# Patient Record
Sex: MALE | Race: WHITE | NOT HISPANIC OR LATINO | Employment: FULL TIME | URBAN - METROPOLITAN AREA
[De-identification: names, ages, dates, MRNs, and addresses within clinical notes are randomized per-mention and may not be internally consistent; named-entity substitution may affect disease eponyms.]

---

## 2018-03-28 ENCOUNTER — HOSPITAL ENCOUNTER (EMERGENCY)
Facility: CLINIC | Age: 33
Discharge: HOME | End: 2018-03-28
Attending: EMERGENCY MEDICINE | Admitting: EMERGENCY MEDICINE
Payer: COMMERCIAL

## 2018-03-28 VITALS
WEIGHT: 204 LBS | OXYGEN SATURATION: 95 % | TEMPERATURE: 98.9 F | DIASTOLIC BLOOD PRESSURE: 79 MMHG | RESPIRATION RATE: 18 BRPM | HEART RATE: 89 BPM | SYSTOLIC BLOOD PRESSURE: 134 MMHG

## 2018-03-28 DIAGNOSIS — R10.84 ABDOMINAL PAIN, GENERALIZED: ICD-10-CM

## 2018-03-28 DIAGNOSIS — R51.9 ACUTE NONINTRACTABLE HEADACHE, UNSPECIFIED HEADACHE TYPE: ICD-10-CM

## 2018-03-28 LAB
ALBUMIN SERPL-MCNC: 3.7 G/DL (ref 3.4–5)
ALP SERPL-CCNC: 87 U/L (ref 40–150)
ALT SERPL W P-5'-P-CCNC: 34 U/L (ref 0–70)
ANION GAP SERPL CALCULATED.3IONS-SCNC: 9 MMOL/L (ref 3–14)
AST SERPL W P-5'-P-CCNC: 40 U/L (ref 0–45)
BASOPHILS # BLD AUTO: 0 10E9/L (ref 0–0.2)
BASOPHILS NFR BLD AUTO: 0.8 %
BILIRUB SERPL-MCNC: 0.7 MG/DL (ref 0.2–1.3)
BUN SERPL-MCNC: 11 MG/DL (ref 7–30)
CALCIUM SERPL-MCNC: 8.5 MG/DL (ref 8.5–10.1)
CHLORIDE SERPL-SCNC: 105 MMOL/L (ref 94–109)
CO2 SERPL-SCNC: 26 MMOL/L (ref 20–32)
CREAT SERPL-MCNC: 0.74 MG/DL (ref 0.66–1.25)
DIFFERENTIAL METHOD BLD: ABNORMAL
EOSINOPHIL # BLD AUTO: 0.1 10E9/L (ref 0–0.7)
EOSINOPHIL NFR BLD AUTO: 1.5 %
ERYTHROCYTE [DISTWIDTH] IN BLOOD BY AUTOMATED COUNT: 14.1 % (ref 10–15)
GFR SERPL CREATININE-BSD FRML MDRD: >90 ML/MIN/1.7M2
GLUCOSE SERPL-MCNC: 276 MG/DL (ref 70–99)
HCT VFR BLD AUTO: 41.2 % (ref 40–53)
HGB BLD-MCNC: 13.3 G/DL (ref 13.3–17.7)
IMM GRANULOCYTES # BLD: 0 10E9/L (ref 0–0.4)
IMM GRANULOCYTES NFR BLD: 0.4 %
LIPASE SERPL-CCNC: 221 U/L (ref 73–393)
LYMPHOCYTES # BLD AUTO: 1.4 10E9/L (ref 0.8–5.3)
LYMPHOCYTES NFR BLD AUTO: 27.6 %
MCH RBC QN AUTO: 31.2 PG (ref 26.5–33)
MCHC RBC AUTO-ENTMCNC: 32.3 G/DL (ref 31.5–36.5)
MCV RBC AUTO: 97 FL (ref 78–100)
MONOCYTES # BLD AUTO: 0.2 10E9/L (ref 0–1.3)
MONOCYTES NFR BLD AUTO: 4.2 %
NEUTROPHILS # BLD AUTO: 3.4 10E9/L (ref 1.6–8.3)
NEUTROPHILS NFR BLD AUTO: 65.5 %
NRBC # BLD AUTO: 0 10*3/UL
NRBC BLD AUTO-RTO: 0 /100
PLATELET # BLD AUTO: 154 10E9/L (ref 150–450)
POTASSIUM SERPL-SCNC: 3.7 MMOL/L (ref 3.4–5.3)
PROT SERPL-MCNC: 7.6 G/DL (ref 6.8–8.8)
RBC # BLD AUTO: 4.26 10E12/L (ref 4.4–5.9)
SODIUM SERPL-SCNC: 140 MMOL/L (ref 133–144)
WBC # BLD AUTO: 5.2 10E9/L (ref 4–11)

## 2018-03-28 PROCEDURE — 25000132 ZZH RX MED GY IP 250 OP 250 PS 637: Performed by: EMERGENCY MEDICINE

## 2018-03-28 PROCEDURE — 96361 HYDRATE IV INFUSION ADD-ON: CPT

## 2018-03-28 PROCEDURE — 99284 EMERGENCY DEPT VISIT MOD MDM: CPT | Mod: 25

## 2018-03-28 PROCEDURE — 96374 THER/PROPH/DIAG INJ IV PUSH: CPT

## 2018-03-28 PROCEDURE — 25000128 H RX IP 250 OP 636: Performed by: EMERGENCY MEDICINE

## 2018-03-28 PROCEDURE — 85025 COMPLETE CBC W/AUTO DIFF WBC: CPT | Performed by: EMERGENCY MEDICINE

## 2018-03-28 PROCEDURE — 96375 TX/PRO/DX INJ NEW DRUG ADDON: CPT

## 2018-03-28 PROCEDURE — 80053 COMPREHEN METABOLIC PANEL: CPT | Performed by: EMERGENCY MEDICINE

## 2018-03-28 PROCEDURE — 83690 ASSAY OF LIPASE: CPT | Performed by: EMERGENCY MEDICINE

## 2018-03-28 RX ORDER — DIPHENHYDRAMINE HYDROCHLORIDE 50 MG/ML
25 INJECTION INTRAMUSCULAR; INTRAVENOUS ONCE
Status: DISCONTINUED | OUTPATIENT
Start: 2018-03-28 | End: 2018-03-28 | Stop reason: HOSPADM

## 2018-03-28 RX ORDER — DIPHENHYDRAMINE HCL 25 MG
25 CAPSULE ORAL ONCE
Status: COMPLETED | OUTPATIENT
Start: 2018-03-28 | End: 2018-03-28

## 2018-03-28 RX ORDER — DIPHENHYDRAMINE HYDROCHLORIDE 50 MG/ML
25 INJECTION INTRAMUSCULAR; INTRAVENOUS ONCE
Status: COMPLETED | OUTPATIENT
Start: 2018-03-28 | End: 2018-03-28

## 2018-03-28 RX ADMIN — DIPHENHYDRAMINE HYDROCHLORIDE 25 MG: 50 INJECTION, SOLUTION INTRAMUSCULAR; INTRAVENOUS at 20:00

## 2018-03-28 RX ADMIN — SODIUM CHLORIDE 1000 ML: 9 INJECTION, SOLUTION INTRAVENOUS at 20:06

## 2018-03-28 RX ADMIN — PROCHLORPERAZINE EDISYLATE 10 MG: 5 INJECTION INTRAMUSCULAR; INTRAVENOUS at 20:03

## 2018-03-28 RX ADMIN — DIPHENHYDRAMINE HYDROCHLORIDE 25 MG: 25 CAPSULE ORAL at 21:07

## 2018-03-28 ASSESSMENT — ENCOUNTER SYMPTOMS
VOMITING: 0
HEADACHES: 1
ABDOMINAL PAIN: 1
NAUSEA: 0
FEVER: 0

## 2018-03-28 NOTE — ED AVS SNAPSHOT
[facility] Emergency Department    [address]    [facility] [address]    Phone:  [phone]    Fax:  [phone]                                       Javi Brown   MRN: [id number]    Department:  [facility] Emergency Department   Date of Visit:  3/28/2018           Patient Information     Date Of Birth          1985        Your diagnoses for this visit were:     Acute nonintractable headache, unspecified headache type     Abdominal pain, generalized        You were seen by Tigre Conner MD.      Follow-up Information     Schedule an appointment as soon as possible for a visit with Lexi Roger    Specialty:  Family Practice    Contact information:    [facility]  [address] [place] [address]  [phone]          Follow up with [facility] Emergency Department.    Specialty:  EMERGENCY MEDICINE    Why:  If symptoms worsen    Contact information:    [address]  [facility] [address]  [phone]        Discharge Instructions       Discharge Instructions  Headache    You were seen today for a headache. Headaches may be caused by many different things such as muscle tension, sinus inflammation, anxiety and stress, having too little sleep, too much alcohol, some medical conditions or injury. You may have a migraine, which is caused by changes in the blood vessels in your head.  At this time your provider does not find that your headache is a sign of anything dangerous or life-threatening.  However, sometimes the signs of serious illness do not show up right away.      Generally, every Emergency Department visit should have a follow-up clinic visit with either a primary or a specialty clinic/provider. Please follow-up as instructed by your emergency provider today.    Return to the Emergency Department if:    You get a new fever of 100.4 F or higher.    Your headache gets much worse.    You get a stiff  neck with your headache.    You get a new headache that is significantly different or worse than headaches you have had before.    You are vomiting (throwing up) and cannot keep food or water down.    You have blurry or double vision or other problems with your eyes.    You have a new weakness on one side of your body.    You have difficulty with balance which is new.    You or your family thinks you are confused.    You have a seizure.    What can I do to help myself?    Pain medications - You may take a pain medication such as Tylenol  (acetaminophen), Advil , Motrin  (ibuprofen) or Aleve  (naproxen).    Take a pain reliever as soon as you notice symptoms.  Starting medications as soon as you start to have symptoms may lessen the amount of pain you have.    Relaxing in a quiet, dark room may help.    Get enough sleep and eat meals regularly.    You may need to watch for certain foods or other things which may trigger your headaches.  Keeping a journal of your headaches and possible triggers may help you and your primary provider to identify things which you should avoid which may be causing your headaches.  If you were given a prescription for medicine here today, be sure to read all of the information (including the package insert) that comes with your prescription.  This will include important information about the medicine, its side effects, and any warnings that you need to know about.  The pharmacist who fills the prescription can provide more information and answer questions you may have about the medicine.  If you have questions or concerns that the pharmacist cannot address, please call or return to the Emergency Department.   Remember that you can always come back to the Emergency Department if you are not able to see your regular provider in the amount of time listed above, if you get any new symptoms, or if there is anything that worries you.      24 Hour Appointment Hotline       To make an appointment  at any [place] clinic, call [phone] ([phone]). If you don't have a family doctor or clinic, we will help you find one. [facility] are conveniently located to serve the needs of you and your family.             Review of your medicines      Our records show that you are taking the medicines listed below. If these are incorrect, please call your family doctor or clinic.        Dose / Directions Last dose taken    ONDANSETRON PO        Refills:  0        VITAMIN B 12 PO        Refills:  0        VITAMIN D (CHOLECALCIFEROL) PO        Take by mouth daily   Refills:  0                Procedures and tests performed during your visit     CBC with platelets differential    Comprehensive metabolic panel    Lipase      Orders Needing Specimen Collection     None      Pending Results     No orders found from 3/26/2018 to 3/29/2018.            Pending Culture Results     No orders found from 3/26/2018 to 3/29/2018.            Pending Results Instructions     If you had any lab results that were not finalized at the time of your Discharge, you can call the ED Lab Result RN at [phone]. You will be contacted by this team for any positive Lab results or changes in treatment. The nurses are available 7 days a week from 10A to 6:30P.  You can leave a message 24 hours per day and they will return your call.        Test Results From Your Hospital Stay        3/28/2018  8:04 PM      Component Results     Component Value Ref Range & Units Status    WBC 5.2 4.0 - 11.0 10e9/L Final    RBC Count 4.26 (L) 4.4 - 5.9 10e12/L Final    Hemoglobin 13.3 13.3 - 17.7 g/dL Final    Hematocrit 41.2 40.0 - 53.0 % Final    MCV 97 78 - 100 fl Final    MCH 31.2 26.5 - 33.0 pg Final    MCHC 32.3 31.5 - 36.5 g/dL Final    RDW 14.1 10.0 - 15.0 % Final    Platelet Count 154 150 - 450 10e9/L Final    Diff Method Automated Method  Final    % Neutrophils 65.5 % Final    % Lymphocytes 27.6 % Final    % Monocytes 4.2 % Final    %  Eosinophils 1.5 % Final    % Basophils 0.8 % Final    % Immature Granulocytes 0.4 % Final    Nucleated RBCs 0 0 /100 Final    Absolute Neutrophil 3.4 1.6 - 8.3 10e9/L Final    Absolute Lymphocytes 1.4 0.8 - 5.3 10e9/L Final    Absolute Monocytes 0.2 0.0 - 1.3 10e9/L Final    Absolute Eosinophils 0.1 0.0 - 0.7 10e9/L Final    Absolute Basophils 0.0 0.0 - 0.2 10e9/L Final    Abs Immature Granulocytes 0.0 0 - 0.4 10e9/L Final    Absolute Nucleated RBC 0.0  Final         3/28/2018  8:29 PM      Component Results     Component Value Ref Range & Units Status    Sodium 140 133 - 144 mmol/L Final    Potassium 3.7 3.4 - 5.3 mmol/L Final    Specimen slightly hemolyzed, potassium may be falsely elevated    Chloride 105 94 - 109 mmol/L Final    Carbon Dioxide 26 20 - 32 mmol/L Final    Anion Gap 9 3 - 14 mmol/L Final    Glucose 276 (H) 70 - 99 mg/dL Final    Urea Nitrogen 11 7 - 30 mg/dL Final    Creatinine 0.74 0.66 - 1.25 mg/dL Final    GFR Estimate >90 >60 mL/min/1.7m2 Final    Non  GFR Calc    GFR Estimate If Black >90 >60 mL/min/1.7m2 Final    African American GFR Calc    Calcium 8.5 8.5 - 10.1 mg/dL Final    Bilirubin Total 0.7 0.2 - 1.3 mg/dL Final    Albumin 3.7 3.4 - 5.0 g/dL Final    Protein Total 7.6 6.8 - 8.8 g/dL Final    Alkaline Phosphatase 87 40 - 150 U/L Final    ALT 34 0 - 70 U/L Final    AST 40 0 - 45 U/L Final    Specimen is hemolyzed which can falsely elevate AST. Analysis of a   non-hemolyzed specimen may result in a lower value.           3/28/2018  8:29 PM      Component Results     Component Value Ref Range & Units Status    Lipase 221 73 - 393 U/L Final                Clinical Quality Measure: Blood Pressure Screening     Your blood pressure was checked while you were in the emergency department today. The last reading we obtained was  BP: 138/89 . Please read the guidelines below about what these numbers mean and what you should do about them.  If your systolic blood pressure (the top  "number) is less than 120 and your diastolic blood pressure (the bottom number) is less than 80, then your blood pressure is normal. There is nothing more that you need to do about it.  If your systolic blood pressure (the top number) is 120-139 or your diastolic blood pressure (the bottom number) is 80-89, your blood pressure may be higher than it should be. You should have your blood pressure rechecked within a year by a primary care provider.  If your systolic blood pressure (the top number) is 140 or greater or your diastolic blood pressure (the bottom number) is 90 or greater, you may have high blood pressure. High blood pressure is treatable, but if left untreated over time it can put you at risk for heart attack, stroke, or kidney failure. You should have your blood pressure rechecked by a primary care provider within the next 4 weeks.  If your provider in the emergency department today gave you specific instructions to follow-up with your doctor or provider even sooner than that, you should follow that instruction and not wait for up to 4 weeks for your follow-up visit.        Thank you for choosing [facility]       Thank you for choosing [facility] for your care. Our goal is always to provide you with excellent care. Hearing back from our patients is one way we can continue to improve our services. Please take a few minutes to complete the written survey that you may receive in the mail after you visit with us. Thank you!        [facility] Information     [facility] lets you send messages to your doctor, view your test results, renew your prescriptions, schedule appointments and more. To sign up, go to [email or web address] . Click on \"Log in\" on the left side of the screen, which will take you to the Welcome page. Then click on \"Sign up Now\" on the right side of the page.     You will be asked to enter the access code listed below, as well as some personal information. Please follow the directions to create your " username and password.     Your access code is: [email or web address]  Expires: 2018  9:10 PM     Your access code will  in 90 days. If you need help or a new code, please call your [facility] clinic or [phone].        Care EveryWhere ID     This is your Care EveryWhere ID. This could be used by other organizations to access your [facility] medical records  [id number]        Equal Access to Services     [facility] : Hadii tresa dhillon hadasho Soomaali, waaxda luqadaha, qaybta kaalmada adeegyada, yasemin leonard . So [facility] [place] [phone].    ATENCIÓN: Si habla español, tiene a lemos disposición servicios gratuitos de asistencia lingüística. Llame al [phone].    We comply with applicable federal civil rights laws and [place] laws. We do not discriminate on the basis of race, color, national origin, age, disability, sex, sexual orientation, or gender identity.            After Visit Summary       This is your record. Keep this with you and show to your community pharmacist(s) and doctor(s) at your next visit.

## 2018-03-28 NOTE — ED AVS SNAPSHOT
[facility] Emergency Department    [address]    [facility] [address]    Phone:  [phone]    Fax:  [phone]                                       Javi Brown   MRN: [id number]    Department:  [facility] Emergency Department   Date of Visit:  3/28/2018           After Visit Summary Signature Page     I have received my discharge instructions, and my questions have been answered. I have discussed any challenges I see with this plan with the nurse or doctor.    ..........................................................................................................................................  Patient/Patient Representative Signature      ..........................................................................................................................................  Patient Representative Print Name and Relationship to Patient    ..................................................               ................................................  Date                                            Time    ..........................................................................................................................................  Reviewed by Signature/Title    ...................................................              ..............................................  Date                                                            Time

## 2018-03-29 ENCOUNTER — NURSE TRIAGE (OUTPATIENT)
Dept: NURSING | Facility: CLINIC | Age: 33
End: 2018-03-29

## 2018-03-29 NOTE — ED NOTES
"C/O abdominal pain and headache. Gastric bypass two years ago. Has these symptoms intermittently since then. Pain above umbilicus area. GB was taken out in November 2016 and was doing \"somewhat better\" after that. Pain has been persistent all day. Nothing makes it worse or better. Hx \"reactive hypoglycemia\" and almost passed out this morning, though he did not check his blood sugar. Does not feel like BG is low at this time.   "

## 2018-03-29 NOTE — TELEPHONE ENCOUNTER
Patient called reporting that he was recently seen in the ER for a migraine and was given compazine. Patient reports that he is experiencing restlessness especially in his legs. He told the nurses in the ER and they reported it is a side effect of the medication and that it should go away soon since they were not giving him anymore. Patient was wondering if there was anything he could do to help lessen the restlessness. Recommended patient call the pharmacy. Also recommended that if it is still bothersome to call his PCP (Raymon) to see if he can be seen. Patient agreed with plan. RN advised to call back with any changes, worsening of symptoms, and questions or concerns.     Reason for Disposition    Caller has medication question about med not prescribed by PCP and triager unable to answer question (e.g., compatibility with other med, storage)    Additional Information    Negative: Drug overdose and nurse unable to answer question    Negative: Caller requesting information not related to medicine    Negative: Caller requesting a prescription for Strep throat and has a positive culture result    Negative: Rash while taking a medication or within 3 days of stopping it    Negative: Immunization reaction suspected    Negative: [1] Asthma and [2] having symptoms of asthma (cough, wheezing, etc)    Negative: MORE THAN A DOUBLE DOSE of a prescription or over-the-counter (OTC) drug    Negative: [1] DOUBLE DOSE (an extra dose or lesser amount) of over-the-counter (OTC) drug AND [2] any symptoms (e.g., dizziness, nausea, pain, sleepiness)    Negative: [1] DOUBLE DOSE (an extra dose or lesser amount) of prescription drug AND [2] any symptoms (e.g., dizziness, nausea, pain, sleepiness)    Negative: Took another person's prescription drug    Negative: [1] DOUBLE DOSE (an extra dose or lesser amount) of prescription drug AND [2] NO symptoms (Exception: a double dose of antibiotics)    Negative: Diabetes drug error or overdose  "(e.g., insulin or extra dose)    Negative: [1] Request for URGENT new prescription or refill of \"essential\" medication (i.e., likelihood of harm to patient if not taken) AND [2] triager unable to fill per unit policy    Negative: [1] Prescription not at pharmacy AND [2] was prescribed today by PCP    Negative: Pharmacy calling with prescription questions and triager unable to answer question    Negative: Caller has URGENT medication question about med that PCP prescribed and triager unable to answer question    Negative: Caller has NON-URGENT medication question about med that PCP prescribed and triager unable to answer question    Negative: Caller requesting a NON-URGENT new prescription or refill and triager unable to refill per unit policy    Protocols used: MEDICATION QUESTION CALL-ADULT-    Lizzie Arredondo RN/[facility]    "

## 2018-03-29 NOTE — ED NOTES
Pt given discharge instructions and verbalized understanding of not driving home related to medications given.  Discharged to lobby to wait for ride.

## 2018-03-29 NOTE — ED PROVIDER NOTES
History     Chief Complaint:  Headache and Abdominal Pain    HPI   Javi Brown is a 32 year old male who presents to the ED for evaluation of a headache and abdominal pain. The patient reports that he was driving this morning when he developed abdominal pain. He notes that he has reactive hypoglycemia after having a gastric bypass, and additionally had a near syncopal episode this morning. He later developed a frontal headache, and his abdominal pain persisted, so he presented to the ED for evaluation. Of note, he had a gastric bypass 2 years ago, and notes having had similar abdominal pain episodes since then. He denies any penile pain, testicular pain, nausea, vomiting, or fevers. He notes that this headache is different from those in the past. He has not taken any medication for the pain.    Allergies:  Reglan    Medications:    Zofran    Past Medical History:    Gastric bypass    Past Surgical History:    The patient does not have any pertinent past surgical history.    Family History:    No past pertinent family history.    Social History:  Negative for tobacco use.  Negative for alcohol use.    Review of Systems   Constitutional: Negative for fever.   Gastrointestinal: Positive for abdominal pain. Negative for nausea and vomiting.   Genitourinary: Negative for penile pain and testicular pain.   Neurological: Positive for headaches.   All other systems reviewed and are negative.    Physical Exam     Patient Vitals for the past 24 hrs:   BP Temp Temp src Pulse Heart Rate Resp SpO2 Weight   03/28/18 2045 138/89 - - - - - 95 % -   03/28/18 2030 133/88 - - - - - 97 % -   03/28/18 2015 (!) 132/98 - - - - - 94 % -   03/28/18 2000 125/69 - - - - - 94 % -   03/28/18 1945 123/65 - - - - - 96 % -   03/28/18 1910 151/81 98.9  F (37.2  C) Oral 89 89 18 100 % 92.5 kg (204 lb)     Physical Exam  General: Alert, appears well-developed and well-nourished. Cooperative.     In mild  MD aware of patient's BP. Approx 900/1000mL NS bolus completed, new orders noted to give patient another 1L of NS after 1st bag completes.     Ted Ferguson RN  01/02/17 0597     distress  HEENT:  Head:  Atraumatic  Ears:  External ears are normal  Mouth/Throat:  Oropharynx is without erythema or exudate and mucous membranes are moist.   Eyes:   Conjunctivae normal and EOM are normal. No scleral icterus.    Pupils are equal, round, and reactive to light.   Neck:   Normal range of motion. Neck supple.  CV:  Normal rate, regular rhythm, normal heart sounds and radial pulses are 2+ and symmetric.  No murmur.  Resp:  Breath sounds are clear bilaterally    Non-labored, no retractions or accessory muscle use  GI:  Abdomen is soft, no distension, no tenderness. No rebound or guarding.  No CVA tenderness bilaterally  MS:  Normal range of motion. No edema.    Normal strength in all 4 extremities.     Back atraumatic.    No midline cervical, thoracic, or lumbar tenderness  Skin:  Warm and dry.  No rash or lesions noted. Well healed abdominal surgical scars.  Neuro:   Alert. Normal strength.  GCS: 15  Psych: Normal mood and affect.    Emergency Department Course     Laboratory:  CBC: WBC: 5.2, HGB: 13.3, PLT: 154  CMP: Glucose 276 (H), o/w WNL (Creatinine: 0.74)    Lipase: 221    Interventions:  Medications   diphenhydrAMINE (BENADRYL) injection 25 mg (not administered)   0.9% sodium chloride BOLUS (1,000 mLs Intravenous New Bag 3/28/18 2006)   prochlorperazine (COMPAZINE) injection 10 mg (10 mg Intravenous Given 3/28/18 2003)   diphenhydrAMINE (BENADRYL) injection 25 mg (25 mg Intravenous Given 3/28/18 2000)   diphenhydrAMINE (BENADRYL) capsule 25 mg (25 mg Oral Given 3/28/18 2107)   2000 Benadryl 25 mg IV  2003 Compazine 10 mg IV injection  2006 NS 1L IV  2107 Benadryl 25 mg PO    Emergency Department Course:  Nursing notes and vitals reviewed. (1944) I performed an exam of the patient as documented above.     IV inserted. Medicine administered as documented above. Blood drawn. This was sent to the lab for further testing, results above.    (2100) I rechecked the patient and discussed the results of  his workup thus far. His headache has resolved.    Findings and plan explained to the Patient. Patient discharged home with instructions regarding supportive care, medications, and reasons to return. The importance of close follow-up was reviewed.    I personally reviewed the laboratory results with the Patient and answered all related questions prior to discharge.    Impression & Plan      Medical Decision Making:  Javi Brown is a 32 year old male who presents with a headache and abdominal pain.  I considered a broad differential diagnosis for this patient including tension, migraine, analgesic rebound, occipital neuralgia, etc.  I also considered other less common but serious causes considered included meningitis, encephalitis, subarachnoid bleed, temporal arteritis, stroke, tumor, etc.  He has no signs of serious headache etiologies at this point.  No advanced imaging is indicated, nor is CT/lumbar puncture for SAH.  Patient also had generalized abdominal cramping although had a nonfocal abdominal exam.  Patient does have a history of gastric bypass.  He does not have any concerning physical exam findings or history concerns requiring further imaging intervention or work up such as a CT scan.  Patient felt comfortable with this decision making at this time.  He has no elicited tenderness on our exam.  No nausea or vomiting.  Lab workup is unremarkable here with normal-appearing electrolytes no evidence of liver dysfunction or elevated lipase.  Low concern for sinister intra-abdominal pathologies.  Patient's headache was resolved prior to discharge.  His questions were answered and he feels improved after above interventions in ED.  Supportive outpatient management is therefore indicated.  Headache precautions given for home.      Diagnosis:    ICD-10-CM   1. Acute nonintractable headache, unspecified headache type R51   2. Abdominal pain, generalized R10.84     Disposition:  discharged to home    Scribe  Disclosure:  I, Crystal Radha, am serving as a scribe on 3/28/2018 at 7:33 PM to personally document services performed by Tigre Conner MD based on my observations and the provider's statements to me.    Crystal Garcia  3/28/2018   [facility] EMERGENCY DEPARTMENT       Tigre Conner MD  03/28/18 6763

## 2020-04-11 ENCOUNTER — TRANSFERRED RECORDS (OUTPATIENT)
Dept: HEALTH INFORMATION MANAGEMENT | Facility: CLINIC | Age: 35
End: 2020-04-11

## 2020-04-15 ENCOUNTER — TRANSFERRED RECORDS (OUTPATIENT)
Dept: HEALTH INFORMATION MANAGEMENT | Facility: CLINIC | Age: 35
End: 2020-04-15

## 2020-04-20 ENCOUNTER — TRANSFERRED RECORDS (OUTPATIENT)
Dept: HEALTH INFORMATION MANAGEMENT | Facility: CLINIC | Age: 35
End: 2020-04-20

## 2020-05-04 ENCOUNTER — APPOINTMENT (OUTPATIENT)
Dept: CT IMAGING | Facility: CLINIC | Age: 35
End: 2020-05-04
Attending: PHYSICIAN ASSISTANT
Payer: COMMERCIAL

## 2020-05-04 ENCOUNTER — HOSPITAL ENCOUNTER (EMERGENCY)
Facility: CLINIC | Age: 35
Discharge: HOME | End: 2020-05-04
Attending: PHYSICIAN ASSISTANT | Admitting: PHYSICIAN ASSISTANT
Payer: COMMERCIAL

## 2020-05-04 VITALS
OXYGEN SATURATION: 99 % | SYSTOLIC BLOOD PRESSURE: 170 MMHG | DIASTOLIC BLOOD PRESSURE: 109 MMHG | TEMPERATURE: 97.6 F | RESPIRATION RATE: 16 BRPM | HEIGHT: 70 IN | WEIGHT: 219 LBS | BODY MASS INDEX: 31.35 KG/M2

## 2020-05-04 DIAGNOSIS — R10.84 ABDOMINAL PAIN, GENERALIZED: ICD-10-CM

## 2020-05-04 LAB
ALBUMIN SERPL-MCNC: 4.2 G/DL (ref 3.4–5)
ALP SERPL-CCNC: 63 U/L (ref 40–150)
ALT SERPL W P-5'-P-CCNC: 31 U/L (ref 0–70)
ANION GAP SERPL CALCULATED.3IONS-SCNC: 6 MMOL/L (ref 3–14)
AST SERPL W P-5'-P-CCNC: 32 U/L (ref 0–45)
BASOPHILS # BLD AUTO: 0 10E9/L (ref 0–0.2)
BASOPHILS NFR BLD AUTO: 0.6 %
BILIRUB SERPL-MCNC: 0.9 MG/DL (ref 0.2–1.3)
BUN SERPL-MCNC: 21 MG/DL (ref 7–30)
CALCIUM SERPL-MCNC: 9.2 MG/DL (ref 8.5–10.1)
CHLORIDE SERPL-SCNC: 105 MMOL/L (ref 94–109)
CO2 SERPL-SCNC: 27 MMOL/L (ref 20–32)
CREAT SERPL-MCNC: 0.99 MG/DL (ref 0.66–1.25)
DIFFERENTIAL METHOD BLD: NORMAL
EOSINOPHIL # BLD AUTO: 0.1 10E9/L (ref 0–0.7)
EOSINOPHIL NFR BLD AUTO: 2.1 %
ERYTHROCYTE [DISTWIDTH] IN BLOOD BY AUTOMATED COUNT: 13.8 % (ref 10–15)
GFR SERPL CREATININE-BSD FRML MDRD: >90 ML/MIN/{1.73_M2}
GLUCOSE SERPL-MCNC: 88 MG/DL (ref 70–99)
HCT VFR BLD AUTO: 43.1 % (ref 40–53)
HGB BLD-MCNC: 14.4 G/DL (ref 13.3–17.7)
IMM GRANULOCYTES # BLD: 0 10E9/L (ref 0–0.4)
IMM GRANULOCYTES NFR BLD: 0.2 %
LIPASE SERPL-CCNC: 234 U/L (ref 73–393)
LYMPHOCYTES # BLD AUTO: 1.6 10E9/L (ref 0.8–5.3)
LYMPHOCYTES NFR BLD AUTO: 25.6 %
MCH RBC QN AUTO: 31.9 PG (ref 26.5–33)
MCHC RBC AUTO-ENTMCNC: 33.4 G/DL (ref 31.5–36.5)
MCV RBC AUTO: 95 FL (ref 78–100)
MONOCYTES # BLD AUTO: 0.7 10E9/L (ref 0–1.3)
MONOCYTES NFR BLD AUTO: 10.5 %
NEUTROPHILS # BLD AUTO: 3.8 10E9/L (ref 1.6–8.3)
NEUTROPHILS NFR BLD AUTO: 61 %
NRBC # BLD AUTO: 0 10*3/UL
NRBC BLD AUTO-RTO: 0 /100
PLATELET # BLD AUTO: 161 10E9/L (ref 150–450)
POTASSIUM SERPL-SCNC: 4 MMOL/L (ref 3.4–5.3)
PROT SERPL-MCNC: 8 G/DL (ref 6.8–8.8)
RBC # BLD AUTO: 4.52 10E12/L (ref 4.4–5.9)
SODIUM SERPL-SCNC: 138 MMOL/L (ref 133–144)
WBC # BLD AUTO: 6.3 10E9/L (ref 4–11)

## 2020-05-04 PROCEDURE — 80053 COMPREHEN METABOLIC PANEL: CPT | Performed by: PHYSICIAN ASSISTANT

## 2020-05-04 PROCEDURE — 74177 CT ABD & PELVIS W/CONTRAST: CPT

## 2020-05-04 PROCEDURE — 96360 HYDRATION IV INFUSION INIT: CPT | Mod: 59

## 2020-05-04 PROCEDURE — 96361 HYDRATE IV INFUSION ADD-ON: CPT

## 2020-05-04 PROCEDURE — 83690 ASSAY OF LIPASE: CPT | Performed by: PHYSICIAN ASSISTANT

## 2020-05-04 PROCEDURE — 25800030 ZZH RX IP 258 OP 636: Performed by: PHYSICIAN ASSISTANT

## 2020-05-04 PROCEDURE — 99285 EMERGENCY DEPT VISIT HI MDM: CPT | Mod: 25

## 2020-05-04 PROCEDURE — 85025 COMPLETE CBC W/AUTO DIFF WBC: CPT | Performed by: PHYSICIAN ASSISTANT

## 2020-05-04 PROCEDURE — 25000125 ZZHC RX 250: Performed by: PHYSICIAN ASSISTANT

## 2020-05-04 PROCEDURE — 25000128 H RX IP 250 OP 636: Performed by: PHYSICIAN ASSISTANT

## 2020-05-04 RX ORDER — IOPAMIDOL 755 MG/ML
119 INJECTION, SOLUTION INTRAVASCULAR ONCE
Status: COMPLETED | OUTPATIENT
Start: 2020-05-04 | End: 2020-05-04

## 2020-05-04 RX ADMIN — SODIUM CHLORIDE 71 ML: 9 INJECTION, SOLUTION INTRAVENOUS at 12:27

## 2020-05-04 RX ADMIN — SODIUM CHLORIDE 1000 ML: 9 INJECTION, SOLUTION INTRAVENOUS at 11:41

## 2020-05-04 RX ADMIN — IOPAMIDOL 119 ML: 755 INJECTION, SOLUTION INTRAVENOUS at 12:27

## 2020-05-04 ASSESSMENT — ENCOUNTER SYMPTOMS
DIARRHEA: 0
FEVER: 0
VOMITING: 0
ABDOMINAL PAIN: 1

## 2020-05-04 ASSESSMENT — MIFFLIN-ST. JEOR: SCORE: 1929.63

## 2020-05-04 NOTE — DISCHARGE INSTRUCTIONS
Follow up with gastric bypass surgeon to discuss CT findings.   Return for fevers/chills, increasing pain, or any other new/concerning symptoms.       Discharge Instructions  Abdominal Pain    Abdominal pain (belly pain) can be caused by many things. Your evaluation today does not show the exact cause for your pain. Your provider today has decided that it is unlikely your pain is due to a life threatening problem, or a problem requiring surgery or hospital admission. Sometimes those problems cannot be found right away, so it is very important that you follow up as directed.  Sometimes only the changes which occur over time allow the cause of your pain to be found.    Generally, every Emergency Department visit should have a follow-up clinic visit with either a primary or a specialty clinic/provider. Please follow-up as instructed by your emergency provider today. With abdominal pain, we often recommend very close follow-up, such as the following day.    ADULTS:  Return to the Emergency Department right away if:    You get an oral temperature above 102oF or as directed by your provider.  You have blood in your stools. This may be bright red or appear as black, tarry stools.    You keep vomiting (throwing up) or cannot drink liquids.  You see blood when you vomit.   You cannot have a bowel movement or you cannot pass gas.  Your stomach gets bloated or bigger.  Your skin or the whites of your eyes look yellow.  You faint.  You have bloody, frequent or painful urination (peeing).  You have new symptoms or anything that worries you.    CHILDREN:  Return to the Emergency Department right away if your child has any of the above-listed symptoms or the following:    Pushes your hand away or screams/cries when his/her belly is touched.  You notice your child is very fussy or weak.  Your child is very tired and is too tired to eat or drink.  Your child is dehydrated.  Signs of dehydration can be:  Significant change in the  amount of wet diapers/urine.  Your infant or child starts to have dry mouth and lips, or no saliva (spit) or tears.    PREGNANT WOMEN:  Return to the Emergency Department right away if you have any of the above-listed symptoms or the following:    You have bleeding, leaking fluid or passing tissue from the vagina.  You have worse pain or cramping, or pain in your shoulder or back.  You have vomiting that will not stop.  You have a temperature of 100oF or more.  Your baby is not moving as much as usual.  You faint.  You get a bad headache with or without eye problems and abdominal pain.  You have a seizure.  You have unusual discharge from your vagina and abdominal pain.    Abdominal pain is pretty common during pregnancy.  Your pain may or may not be related to your pregnancy. You should follow-up closely with your OB provider so they can evaluate you and your baby.  Until you follow-up with your regular provider, do the following:     Avoid sex and do not put anything in your vagina.  Drink clear fluids.  Only take medications approved by your provider.    MORE INFORMATION:    Appendicitis:  A possible cause of abdominal pain in any person who still has their appendix is acute appendicitis. Appendicitis is often hard to diagnose.  Testing does not always rule out early appendicitis or other causes of abdominal pain. Close follow-up with your provider and re-evaluations may be needed to figure out the reason for your abdominal pain.    Follow-up:  It is very important that you make an appointment with your clinic and go to the appointment.  If you do not follow-up with your primary provider, it may result in missing an important development which could result in permanent injury or disability and/or lasting pain.  If there is any problem keeping your appointment, call your provider or return to the Emergency Department.    Medications:  Take your medications as directed by your provider today.  Before using  "over-the-counter medications, ask your provider and make sure to take the medications as directed.  If you have any questions about medications, ask your provider.    Diet:  Resume your normal diet as much as possible, but do not eat fried, fatty or spicy foods while you have pain.  Do not drink alcohol or have caffeine.  Do not smoke tobacco.    Probiotics: If you have been given an antibiotic, you may want to also take a probiotic pill or eat yogurt with live cultures. Probiotics have \"good bacteria\" to help your intestines stay healthy. Studies have shown that probiotics help prevent diarrhea (loose stools) and other intestine problems (including C. diff infection) when you take antibiotics. You can buy these without a prescription in the pharmacy section of the store.     If you were given a prescription for medicine here today, be sure to read all of the information (including the package insert) that comes with your prescription.  This will include important information about the medicine, its side effects, and any warnings that you need to know about.  The pharmacist who fills the prescription can provide more information and answer questions you may have about the medicine.  If you have questions or concerns that the pharmacist cannot address, please call or return to the Emergency Department.       Remember that you can always come back to the Emergency Department if you are not able to see your regular provider in the amount of time listed above, if you get any new symptoms, or if there is anything that worries you.   "

## 2020-05-04 NOTE — ED PROVIDER NOTES
History   Chief Complaint:  Abdominal Pain     HPI   Javi Brown is a 34 year old male with history of GERD, pancreatitis, and colitis status post Mark en Y in 2016 and cholecystectomy who presents with abdominal pain. The patient reports that two days ago he had sharp periumbilical abdominal pain. He states the pain has been relatively constant though it does become more sharp occasionally. He states that he has tried everything with no relief of his pain. He denies associated fever, vomiting, or diarrhea. He was concerned as this felt similar to his pain prior to his last hospitalization on 04/12 for colitis. The patient was give Cefdinir and Flagyl during his admission and was discharged home on antibiotics which he has since finished.  The patient does not have a current gastroenterologist.    CT Abdomen Pelvis 04/12/2020  1. No acute posttraumatic abnormality.  2. Status post gastric bypass procedure and cholecystectomy.  3. Other non acute findings as described.    Allergies:  Benadryl [Diphenhydramine]  Reglan [Metoclopramide]    Medications:   Paroxetine  Carafate  Vitamin D2  Zoloft  Calcium carbonate  Multivitamin  Trazodone  Clonidine  Lamictal  Zofran  Omeprazole    Past Medical History:    Obesity  Acute pancreatitis  Fatty liver  Bilateral knee pain  Migraine headaches  Bursitis   Hypoglycemia  Colitis   Gastroesophageal reflux disease     Past Surgical History:    Gastric bypass (Mark en Y in 2016)   Laparoscopic cholecystectomy  panniculectomy    Family History:    Father: heart disease   Uncle: obesity  Mother: obesity  Sister: murmur, mental health disorder    Social History:  Smoking Status: Never Smoker  Smokeless Tobacco: Never Used  Alcohol Use: Yes  PCP: Lexi Roger     Review of Systems   Constitutional: Negative for fever.   Gastrointestinal: Positive for abdominal pain. Negative for diarrhea and vomiting.   All other systems reviewed and are negative.    Physical  "Exam     Patient Vitals for the past 24 hrs:   BP Temp Temp src Heart Rate Resp SpO2 Height Weight   05/04/20 1124 (!) 170/109 97.6  F (36.4  C) Oral 68 16 99 % 1.778 m (5' 10\") 99.3 kg (219 lb)       Physical Exam  General: Alert, interactive. GCS 15  Head:  Scalp is atraumatic.  Eyes:  EOM intact. The pupils are equal, round, and reactive to light. No scleral icterus.   ENT:                                      Ears:  The external ears are normal.   Nose:  The external nose is normal.  Throat:  The oropharynx is normal. Mucus membranes are moist.                 Neck:  Normal range of motion. There is no rigidity.   CV:  Regular rate and rhythm. No murmur. 2+ radial pulses  Resp:  Breath sounds are clear bilaterally. Non-labored, no retractions or accessory muscle use.  GI:  Abdomen is soft, no distension. Diffuse periumbilical tenderness. No localized tenderness no rebound or guarding.   MS:  Normal range of motion.   Skin:  Warm and dry.   Neuro:  Strength and sensation grossly intact.   Psych:  Awake. Alert.  Appropriate interactions.     Emergency Department Course   Imaging:  Radiology findings were communicated with the patient who voiced understanding of the findings.    CT Abdomen Pelvis w Contrast  IMPRESSION:   1.  Mark-en-Y gastric bypass. Fluid in the excluded stomach lumen could be seen with suture line dehiscence. Mild mucosal enhancement of the excluded stomach could be seen with gastritis.  2.  Mild diffuse omental stranding is nonspecific but suggests an inflammatory process. No foci of epiploic appendagitis or omental infarction are identified on this study.  Reading per radiology    Laboratory:  Laboratory findings were communicated with the patient who voiced understanding of the findings.    CBC: WBC 6.3, HGB 14.4,   CMP: WNL (Creatinine 0.99)  Lipase: 234    Interventions:  1141 NS 1000 mL IV    Emergency Department Course:  Nursing notes and vitals reviewed.    1130 I performed an " exam of the patient as documented above.     IV was inserted and blood was drawn for laboratory testing, results above.    The patient was sent for a CT Abdomen Pelvis while in the emergency department, results above.      1304 I rechecked the patient. Explained findings to the Patient.    1343 I spoke with Dr. Segura of the General Surgery service regarding patient's presentation and CT findings.     1346 I returned to update the patient.     Findings and plan explained to the Patient. Patient discharged home with instructions regarding supportive care, medications, and reasons to return. The importance of close follow-up was reviewed.     Impression & Plan    Medical Decision Making:  Javi Brown is a 34 year old male with medical history including Mark-en-Y gastric bypass surgery, cholecystectomy, colitis, chronic abdominal pain presents to the emergency department with periumbilical abdominal pain.  The patient was recently admitted for colitis and given IV antibiotics.  He was discharged home with oral antibiotics and told to follow-up closely with GI.  He has not yet followed up.  He is typically followed at [place], but is in town being evaluated for possible knee surgery prompting his arrival to the emergency department.  Differential diagnosis includes pancreatitis, hepatobiliary etiology, gastritis, bowel obstruction, perforation, colitis, diverticulitis, gastroenteritis, IBS, IBD, among others.  Patient reports a history of similar pain and suspects this is a flareup of his chronic pain.  Ultimately, lab work and CT completed.  Lab work overall unremarkable with no leukocytosis to suggest infection.  No electrolyte abnormality.  Lipase normal.  LFTs normal.  CT reveals Mark-en-Y gastric bypass.  Also noted fluid in the excluded stomach lumen could be seen with suture line dehiscence.  No peritoneal signs or free air to suggest perforation.  I discussed this finding with Dr. Segura of general surgery.   He agreed that if the patient had a benign abdominal exam he could follow closely with gastric bypass surgeon.  The patient feels comfortable with this plan.  He has no epigastric tenderness and tenderness is generalized in his lower abdomen.  Also noted mild diffuse omental stranding.  This is nonspecific.  There is no signs of infection.  I emphasized the importance of close follow-up with GI as colonoscopy for further evaluation may be indicated.  I recommended returning to the emergency department for worsening pain, fevers, black/bloody stool, or any other new/concerning symptoms.  I recommended continuing omeprazole.  The patient agrees with this plan all questions and concerns addressed prior to discharge home.    Diagnosis:    ICD-10-CM    1. Abdominal pain, generalized  R10.84        Disposition:   discharged to home    Scribe Disclosure:  MAGGIE, Antonia Grier, am serving as a scribe at 11:23 AM on 5/4/2020 to document services personally performed by Delicia Gant PA-C based on my observations and the provider's statements to me.   [facility] EMERGENCY DEPARTMENT       Delicia Gant PA-C  05/04/20 [id number]

## 2020-05-04 NOTE — ED AVS SNAPSHOT
Emergency Department  [address] [place] [id number]  Phone:  [phone]  Fax:  [phone]                                    Javi Brown   MRN: [id number]    Department:   Emergency Department   Date of Visit:  5/4/2020           After Visit Summary Signature Page    I have received my discharge instructions, and my questions have been answered. I have discussed any challenges I see with this plan with the nurse or doctor.    ..........................................................................................................................................  Patient/Patient Representative Signature      ..........................................................................................................................................  Patient Representative Print Name and Relationship to Patient    ..................................................               ................................................  Date                                   Time    ..........................................................................................................................................  Reviewed by Signature/Title    ...................................................              ..............................................  Date                                               Time          22EPIC Rev 08/18

## 2020-05-12 ENCOUNTER — TRANSFERRED RECORDS (OUTPATIENT)
Dept: HEALTH INFORMATION MANAGEMENT | Facility: CLINIC | Age: 35
End: 2020-05-12

## 2021-01-12 ENCOUNTER — TRANSFERRED RECORDS (OUTPATIENT)
Dept: HEALTH INFORMATION MANAGEMENT | Facility: CLINIC | Age: 36
End: 2021-01-12

## 2021-01-16 ENCOUNTER — TRANSFERRED RECORDS (OUTPATIENT)
Dept: HEALTH INFORMATION MANAGEMENT | Facility: CLINIC | Age: 36
End: 2021-01-16

## 2021-02-02 ENCOUNTER — TRANSFERRED RECORDS (OUTPATIENT)
Dept: HEALTH INFORMATION MANAGEMENT | Facility: CLINIC | Age: 36
End: 2021-02-02

## 2021-02-03 ENCOUNTER — TRANSFERRED RECORDS (OUTPATIENT)
Dept: HEALTH INFORMATION MANAGEMENT | Facility: CLINIC | Age: 36
End: 2021-02-03

## 2021-02-06 ENCOUNTER — TRANSFERRED RECORDS (OUTPATIENT)
Dept: HEALTH INFORMATION MANAGEMENT | Facility: CLINIC | Age: 36
End: 2021-02-06

## 2021-02-16 ENCOUNTER — MEDICAL CORRESPONDENCE (OUTPATIENT)
Dept: HEALTH INFORMATION MANAGEMENT | Facility: CLINIC | Age: 36
End: 2021-02-16

## 2021-02-23 ENCOUNTER — TRANSFERRED RECORDS (OUTPATIENT)
Dept: HEALTH INFORMATION MANAGEMENT | Facility: CLINIC | Age: 36
End: 2021-02-23

## 2021-02-23 LAB
ALT SERPL-CCNC: 13 U/L (ref 7–55)
AST SERPL-CCNC: 30 U/L (ref 8–48)
CREAT SERPL-MCNC: 0.92 MG/DL (ref 0.74–1.35)
GFR SERPL CREATININE-BSD FRML MDRD: >90 ML/MIN/1.73M2
GLUCOSE SERPL-MCNC: 92 MG/DL (ref 70–140)
POTASSIUM SERPL-SCNC: 4.3 MMOL/L (ref 3.6–5.2)

## 2021-03-01 ENCOUNTER — TRANSFERRED RECORDS (OUTPATIENT)
Dept: HEALTH INFORMATION MANAGEMENT | Facility: CLINIC | Age: 36
End: 2021-03-01

## 2021-03-03 ENCOUNTER — TRANSFERRED RECORDS (OUTPATIENT)
Dept: HEALTH INFORMATION MANAGEMENT | Facility: CLINIC | Age: 36
End: 2021-03-03

## 2021-03-03 LAB
CREAT SERPL-MCNC: 1.07 MG/DL (ref 0.74–1.35)
GFR SERPL CREATININE-BSD FRML MDRD: 88 ML/MIN/BSA
GLUCOSE SERPL-MCNC: 106 MG/DL (ref 70–140)
POTASSIUM SERPL-SCNC: 4.6 MMOL/L (ref 3.6–5.2)

## 2021-03-04 ENCOUNTER — TRANSFERRED RECORDS (OUTPATIENT)
Dept: HEALTH INFORMATION MANAGEMENT | Facility: CLINIC | Age: 36
End: 2021-03-04

## 2021-03-06 ENCOUNTER — TRANSFERRED RECORDS (OUTPATIENT)
Dept: HEALTH INFORMATION MANAGEMENT | Facility: CLINIC | Age: 36
End: 2021-03-06

## 2021-03-06 LAB
CREAT SERPL-MCNC: 0.88 MG/DL (ref 0.74–1.35)
GFR SERPL CREATININE-BSD FRML MDRD: >90 ML/MIN/BSA
GLUCOSE SERPL-MCNC: 93 MG/DL (ref 70–140)
POTASSIUM SERPL-SCNC: 4 MMOL/L (ref 3.6–5.2)

## 2021-03-08 ENCOUNTER — TRANSFERRED RECORDS (OUTPATIENT)
Dept: HEALTH INFORMATION MANAGEMENT | Facility: CLINIC | Age: 36
End: 2021-03-08

## 2021-03-08 LAB
CREAT SERPL-MCNC: 0.99 MG/DL (ref 0.74–1.35)
GFR SERPL CREATININE-BSD FRML MDRD: >90 ML/MIN/BSA
GLUCOSE SERPL-MCNC: 100 MG/DL (ref 70–140)
POTASSIUM SERPL-SCNC: 4.1 MMOL/L (ref 3.6–5.2)

## 2021-03-10 ENCOUNTER — TRANSFERRED RECORDS (OUTPATIENT)
Dept: HEALTH INFORMATION MANAGEMENT | Facility: CLINIC | Age: 36
End: 2021-03-10

## 2021-03-15 ENCOUNTER — ANCILLARY PROCEDURE (OUTPATIENT)
Dept: NEUROLOGY | Facility: CLINIC | Age: 36
End: 2021-03-15
Payer: COMMERCIAL

## 2021-03-15 ENCOUNTER — VIRTUAL VISIT (OUTPATIENT)
Dept: NEUROLOGY | Facility: CLINIC | Age: 36
End: 2021-03-15
Payer: COMMERCIAL

## 2021-03-15 VITALS
TEMPERATURE: 97.1 F | DIASTOLIC BLOOD PRESSURE: 69 MMHG | SYSTOLIC BLOOD PRESSURE: 135 MMHG | WEIGHT: 237.2 LBS | HEART RATE: 57 BPM | BODY MASS INDEX: 34.03 KG/M2

## 2021-03-15 DIAGNOSIS — R56.9 SEIZURE (H): ICD-10-CM

## 2021-03-15 DIAGNOSIS — R56.9 SEIZURE (H): Primary | ICD-10-CM

## 2021-03-15 RX ORDER — LAMOTRIGINE 50 MG/1
TABLET, ORALLY DISINTEGRATING ORAL
Qty: 14 TABLET | Refills: 0 | Status: SHIPPED | OUTPATIENT
Start: 2021-03-15 | End: 2021-03-24

## 2021-03-15 RX ORDER — LAMOTRIGINE 25 MG/1
25 TABLET ORAL
Status: ON HOLD | COMMUNITY
Start: 2021-03-04 | End: 2021-04-08

## 2021-03-15 RX ORDER — SUMATRIPTAN 6 MG/.5ML
6 INJECTION, SOLUTION SUBCUTANEOUS
COMMUNITY
End: 2022-04-08

## 2021-03-15 RX ORDER — TRAZODONE HYDROCHLORIDE 50 MG/1
50-100 TABLET, FILM COATED ORAL
COMMUNITY
Start: 2021-02-12

## 2021-03-15 RX ORDER — SERTRALINE HYDROCHLORIDE 100 MG/1
100 TABLET, FILM COATED ORAL 2 TIMES DAILY
COMMUNITY
Start: 2021-02-12

## 2021-03-15 RX ORDER — HYDROXYZINE PAMOATE 25 MG/1
25-50 CAPSULE ORAL 2 TIMES DAILY PRN
COMMUNITY
Start: 2021-01-14

## 2021-03-15 RX ORDER — LAMOTRIGINE 100 MG/1
TABLET, ORALLY DISINTEGRATING ORAL
Qty: 120 TABLET | Refills: 11 | Status: SHIPPED | OUTPATIENT
Start: 2021-03-15 | End: 2021-03-24

## 2021-03-15 RX ORDER — LAMOTRIGINE 100 MG/1
TABLET ORAL
Status: ON HOLD | COMMUNITY
Start: 2021-03-04 | End: 2021-04-08

## 2021-03-15 RX ORDER — LAMOTRIGINE 200 MG/1
200 TABLET, EXTENDED RELEASE ORAL
Status: ON HOLD | COMMUNITY
Start: 2020-05-01 | End: 2021-04-08

## 2021-03-15 SDOH — HEALTH STABILITY: MENTAL HEALTH: HOW OFTEN DO YOU HAVE A DRINK CONTAINING ALCOHOL?: NEVER

## 2021-03-15 NOTE — PATIENT INSTRUCTIONS
Change lamotrigine XR to ODT to increase absorption and increase dose     Week 1-2: lamotrigine oral disintegrating tablet 250 mg morning after meal and 100 mg evening   Week 3: lamotrigine oral disintegrating tablet 300 mg morning after meal and 100 mg evening     Week 3-4 check lamotrigine level     Follow up  Snyder after hospital visit       I have ordered inpatient Video EEG admission to better understand your case, please schedule up to 7 days for this study.  On average patients stay 5 to 7 days at the [facility].  [address] [place] [address].      During this admission please bring the following:     A list of all your medications or pill bottles    Enough of each medication to last 24 hours after discharge if you have a long commute home    Activities that we will keep you entertained and comfortable during the hospital stay, such as books, DVDs, computer, etc.      It is also helpful to meet with our nurses to better understand the hospital admission process and/or review hospital admission patient DVD.    Please remember to keep a seizure diary.     [place] regulations on driving were reviewed with you and you should not drive until you are three months seizure/spell free.You are prohibited from operating a motor vehicle within 3 months following any seizure or other episode with sudden unconsciousness or inability to sit up, and that it is required to report most recent seizure to the DMV within 30 days after the event.    Avoid any activities that might lead to self-injury or injury of others, within 3 months following any seizure with impaired awareness or impaired motor control such activities include but are not limited to operating power tools, operating firearms, climbing ladders/trees/exposure to heights from which he might fall. Do not operate power tools or heavy machinery and equipment.  Do not swim alone, bathe in any form of tub,  such as bathtub, jacuzzi, or hot tub unless there is a responsible adult close by to provide assistance in case she has a seizure and drowns. Avoid work on hot surfaces such stoves, ovens, or with scalding hot water.         Lesly Snyder MD
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
## 2021-03-21 ENCOUNTER — TELEPHONE (OUTPATIENT)
Dept: NEUROLOGY | Facility: CLINIC | Age: 36
End: 2021-03-21

## 2021-03-22 NOTE — TELEPHONE ENCOUNTER
Patient called stating that he had 3 seizures this morning.  One 5-minute seizure, and couple of 1 minute seizures that followed.  He states he was evaluated in the emergency department and was discharged without any changes in his medicines.  He thinks he needs changes to his medications.  He had initial visit with Dr. Snyder this week.  I see there is a chart diagnosis of psychogenic nonepileptic events, but there is suspicion for epilepsy as well.  He also had EEG this week, although it appears reported not completed.  He states he still feels little off, but is awake, alert, able to tell me details of his day today.    I stated at this time I do not think I would recommend any changes to his seizure medications.  He should continue plan for lamotrigine up titration to outlined by Dr. Snyder, and I will route this message to her.   If he has further seizures, especially in clusters such as this morning I do think he should be evaluated in the emergency room.  Advised if he is unhappy with his care, that he can present to [facility] emergency room if he has further seizures to be evaluated by neurology here.    Artie Aguilera, DO  Neurology

## 2021-03-23 NOTE — TELEPHONE ENCOUNTER
Lesly Snyder MD Me Mincep  Pool; Meghan Kennedy Rn Pool 1 hour ago (9:31 AM)     Please asked patient to complete lamotrigine check.  And I should follow-up with him sooner due to recent ER visit.  Thank you      Voicemail left at the patient's contact number with a request for a return call to arrange a followup with Dr. Snyder. CareEverywhere was updated and lamotrigine level was found to have been completed 3/21/21.

## 2021-03-24 ENCOUNTER — VIRTUAL VISIT (OUTPATIENT)
Dept: NEUROLOGY | Facility: CLINIC | Age: 36
End: 2021-03-24
Payer: COMMERCIAL

## 2021-03-24 DIAGNOSIS — R56.9 SEIZURE (H): ICD-10-CM

## 2021-03-24 RX ORDER — LAMOTRIGINE 200 MG/1
TABLET, ORALLY DISINTEGRATING ORAL
Qty: 60 TABLET | Refills: 11 | Status: ON HOLD | OUTPATIENT
Start: 2021-03-24 | End: 2021-04-16

## 2021-03-24 RX ORDER — DIAZEPAM ORAL SOLUTION (CONCENTRATE) 5 MG/ML
SOLUTION ORAL
Qty: 30 ML | Refills: 0 | Status: SHIPPED | OUTPATIENT
Start: 2021-03-24 | End: 2022-04-11

## 2021-03-24 RX ORDER — LAMOTRIGINE 50 MG/1
TABLET, ORALLY DISINTEGRATING ORAL
Qty: 30 TABLET | Refills: 11 | Status: ON HOLD | OUTPATIENT
Start: 2021-03-24 | End: 2021-04-16
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
## 2021-03-31 ENCOUNTER — PRE VISIT (OUTPATIENT)
Dept: NEUROLOGY | Facility: CLINIC | Age: 36
End: 2021-03-31

## 2021-03-31 NOTE — TELEPHONE ENCOUNTER
FUTURE VISIT INFORMATION      FUTURE VISIT INFORMATION:    Date: 4/5/2021    Time: 1230pm    [place] [facility] [place]:    Referring provider:      Referring providers clinic:      Reason for visit/diagnosis      RECORDS REQUESTED FROM:       Clinic name Comments Records Status Imaging Status   [facility] ED Visit-3/21/2021    CT Head-4/11/2020, 4/20/2020, 1/12/2021, 2/2/2021, 3/3/2021    CT Thoracic, Cervical  AND Lumbar Spine-4/12/2020    MR Cervical and Thoracic Spine-4/15/2020    CT Cervical Spine-4/20/2020, 1/12/2021    MR Brain-5/13/2020 Care EVerywhere Requested to PACS          [facility] ED Visit-3/24/2021 Care Everywhere N/A                        3/31/2021-Request for Images faxed to [facility]-MR @ 339pm    4/5/2021-All [facility] images now in PACS-MR @ 542am

## 2021-04-05 ENCOUNTER — TELEPHONE (OUTPATIENT)
Dept: NEUROLOGY | Facility: CLINIC | Age: 36
End: 2021-04-05

## 2021-04-05 ENCOUNTER — VIRTUAL VISIT (OUTPATIENT)
Dept: NEUROLOGY | Facility: CLINIC | Age: 36
End: 2021-04-05
Payer: COMMERCIAL

## 2021-04-05 ENCOUNTER — ALLIED HEALTH/NURSE VISIT (OUTPATIENT)
Dept: NEUROLOGY | Facility: CLINIC | Age: 36
End: 2021-04-05
Attending: PSYCHIATRY & NEUROLOGY
Payer: COMMERCIAL

## 2021-04-05 DIAGNOSIS — Z11.52 ENCOUNTER FOR SCREENING LABORATORY TESTING FOR COVID-19 VIRUS IN ASYMPTOMATIC PATIENT: Primary | ICD-10-CM

## 2021-04-05 DIAGNOSIS — Z01.812 ENCOUNTER FOR SCREENING LABORATORY TESTING FOR COVID-19 VIRUS IN ASYMPTOMATIC PATIENT: Primary | ICD-10-CM

## 2021-04-05 DIAGNOSIS — Z53.9 ERRONEOUS ENCOUNTER--DISREGARD: Primary | ICD-10-CM

## 2021-04-05 RX ORDER — DIAZEPAM ORAL SOLUTION (CONCENTRATE) 5 MG/ML
5 SOLUTION ORAL PRN
Status: ON HOLD | COMMUNITY
Start: 2020-04-16 | End: 2021-04-08

## 2021-04-05 RX ORDER — ERGOCALCIFEROL 1.25 MG/1
50000 CAPSULE ORAL
COMMUNITY
End: 2022-04-08 | Stop reason: DRUGHIGH

## 2021-04-05 NOTE — PROGRESS NOTES
Javi is a 35 year old who is being evaluated via a billable video visit.      How would you like to obtain your AVS? Mychart  If the video visit is dropped, the invitation should be resent by: [phone]      Video Start Time: 12:21 PM  Video-Visit Details    Type of service:  Video Visit    Video End Time: 12:26    Originating Location (pt. Location): [place]    Distant Location (provider location):  [facility] NEUROLOGY [facility]     Platform used for Video Visit: Eric    The patient was scheduled in error. He was initially scheduled to see me 3/5/2021. Subsequently seen Dr Snyder and has no need of this appt. No charge will be placed.     Gayla Rosado MD
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
## 2021-04-05 NOTE — LETTER
4/5/2021       RE: Javi Brown  [address] [place] [address]     Dear Colleague,    Thank you for referring your patient, Javi Brown, to the [facility] [place] at [facility]. Please see a copy of my visit note below.    Javi is a 35 year old who is being evaluated via a billable video visit.      How would you like to obtain your AVS? Mychart  If the video visit is dropped, the invitation should be resent by: [phone]      Video Start Time: 12:21 PM  Video-Visit Details    Type of service:  Video Visit    Video End Time: 12:26    Originating Location (pt. Location): Home    Distant Location (provider location):  [facility] [place]     Platform used for Video Visit: Manomasa    The patient was scheduled in error. He was initially scheduled to see me 3/5/2021. Subsequently seen Dr Snyder and has no need of this appt. No charge will be placed.     Gayla Rosado MD      Again, thank you for allowing me to participate in the care of your patient.      Sincerely,    Gayla Rosado MD

## 2021-04-05 NOTE — TELEPHONE ENCOUNTER
[facility] Call Center    Phone Message    May a detailed message be left on voicemail: yes     Reason for Call: Other: Patient calling questioning his appointment today at 12:30 and if its needed. patient states he is established with Dr. Snyder and wanting a return call to discuss thank you.      Action Taken: Message routed to:  [facility] ([facility] neurology    Travel Screening: Not Applicable

## 2021-04-05 NOTE — TELEPHONE ENCOUNTER
I agree I don't see a reason for him to need an appt with me. If he is established with Dr Snyder.   Gayla Rosado

## 2021-04-06 LAB
SARS-COV-2 RNA RESP QL NAA+PROBE: NOT DETECTED
SPECIMEN SOURCE: NORMAL

## 2021-04-08 ENCOUNTER — HOSPITAL ENCOUNTER (INPATIENT)
Facility: CLINIC | Age: 36
LOS: 8 days | Discharge: HOME | DRG: 101 | End: 2021-04-16
Attending: PSYCHIATRY & NEUROLOGY | Admitting: PSYCHIATRY & NEUROLOGY
Payer: COMMERCIAL

## 2021-04-08 ENCOUNTER — APPOINTMENT (OUTPATIENT)
Dept: NEUROLOGY | Facility: CLINIC | Age: 36
DRG: 101 | End: 2021-04-08
Attending: PHYSICIAN ASSISTANT
Payer: COMMERCIAL

## 2021-04-08 DIAGNOSIS — R56.9 SEIZURE (H): ICD-10-CM

## 2021-04-08 LAB — GLUCOSE BLDC GLUCOMTR-MCNC: 107 MG/DL (ref 70–99)

## 2021-04-08 PROCEDURE — 999N000128 HC STATISTIC PERIPHERAL IV START W/O US GUIDANCE

## 2021-04-08 PROCEDURE — 80175 DRUG SCREEN QUAN LAMOTRIGINE: CPT | Performed by: PHYSICIAN ASSISTANT

## 2021-04-08 PROCEDURE — 99221 1ST HOSP IP/OBS SF/LOW 40: CPT | Mod: AI | Performed by: PHYSICIAN ASSISTANT

## 2021-04-08 PROCEDURE — 95714 VEEG EA 12-26 HR UNMNTR: CPT

## 2021-04-08 PROCEDURE — 250N000013 HC RX MED GY IP 250 OP 250 PS 637: Performed by: PHYSICIAN ASSISTANT

## 2021-04-08 PROCEDURE — 95720 EEG PHY/QHP EA INCR W/VEEG: CPT | Mod: GC | Performed by: PSYCHIATRY & NEUROLOGY

## 2021-04-08 PROCEDURE — 999N001017 HC STATISTIC GLUCOSE BY METER IP

## 2021-04-08 PROCEDURE — 120N000002 HC R&B MED SURG/OB UMMC

## 2021-04-08 PROCEDURE — 36415 COLL VENOUS BLD VENIPUNCTURE: CPT | Performed by: PHYSICIAN ASSISTANT

## 2021-04-08 RX ORDER — DOCUSATE SODIUM 100 MG/1
100 CAPSULE, LIQUID FILLED ORAL 2 TIMES DAILY PRN
Status: DISCONTINUED | OUTPATIENT
Start: 2021-04-08 | End: 2021-04-16 | Stop reason: HOSPADM

## 2021-04-08 RX ORDER — LORAZEPAM 2 MG/ML
2 INJECTION INTRAMUSCULAR
Status: DISCONTINUED | OUTPATIENT
Start: 2021-04-08 | End: 2021-04-16 | Stop reason: HOSPADM

## 2021-04-08 RX ORDER — SERTRALINE HYDROCHLORIDE 100 MG/1
200 TABLET, FILM COATED ORAL EVERY EVENING
Status: DISCONTINUED | OUTPATIENT
Start: 2021-04-08 | End: 2021-04-16 | Stop reason: HOSPADM

## 2021-04-08 RX ORDER — HYDROXYZINE HYDROCHLORIDE 25 MG/1
25 TABLET, FILM COATED ORAL EVERY EVENING
Status: DISCONTINUED | OUTPATIENT
Start: 2021-04-08 | End: 2021-04-16 | Stop reason: HOSPADM

## 2021-04-08 RX ORDER — LIDOCAINE HYDROCHLORIDE 20 MG/ML
5 SOLUTION OROPHARYNGEAL EVERY 6 HOURS PRN
Status: DISCONTINUED | OUTPATIENT
Start: 2021-04-08 | End: 2021-04-16 | Stop reason: HOSPADM

## 2021-04-08 RX ORDER — ACETAMINOPHEN 500 MG
500 TABLET ORAL EVERY 6 HOURS PRN
Status: DISCONTINUED | OUTPATIENT
Start: 2021-04-08 | End: 2021-04-16 | Stop reason: HOSPADM

## 2021-04-08 RX ORDER — GINSENG 100 MG
CAPSULE ORAL 3 TIMES DAILY PRN
Status: DISCONTINUED | OUTPATIENT
Start: 2021-04-08 | End: 2021-04-16 | Stop reason: HOSPADM

## 2021-04-08 RX ORDER — LAMOTRIGINE 100 MG/1
100 TABLET, ORALLY DISINTEGRATING ORAL 2 TIMES DAILY
Status: DISCONTINUED | OUTPATIENT
Start: 2021-04-08 | End: 2021-04-09

## 2021-04-08 RX ORDER — LAMOTRIGINE 50 MG/1
50 TABLET, ORALLY DISINTEGRATING ORAL EVERY MORNING
Status: DISCONTINUED | OUTPATIENT
Start: 2021-04-09 | End: 2021-04-08

## 2021-04-08 RX ORDER — PEDI MULTIVIT NO.25/FOLIC ACID 300 MCG
1 TABLET,CHEWABLE ORAL EVERY MORNING
Status: DISCONTINUED | OUTPATIENT
Start: 2021-04-09 | End: 2021-04-09

## 2021-04-08 RX ORDER — SUMATRIPTAN 6 MG/.5ML
6 INJECTION, SOLUTION SUBCUTANEOUS
Status: DISCONTINUED | OUTPATIENT
Start: 2021-04-08 | End: 2021-04-16 | Stop reason: HOSPADM

## 2021-04-08 RX ORDER — MAGNESIUM 200 MG
1000 TABLET ORAL DAILY
COMMUNITY

## 2021-04-08 RX ORDER — TRAZODONE HYDROCHLORIDE 50 MG/1
50-100 TABLET, FILM COATED ORAL AT BEDTIME
Status: DISCONTINUED | OUTPATIENT
Start: 2021-04-08 | End: 2021-04-16 | Stop reason: HOSPADM

## 2021-04-08 RX ORDER — ERGOCALCIFEROL 1.25 MG/1
50000 CAPSULE, LIQUID FILLED ORAL
Status: DISCONTINUED | OUTPATIENT
Start: 2021-04-08 | End: 2021-04-16 | Stop reason: HOSPADM

## 2021-04-08 RX ORDER — LAMOTRIGINE 50 MG/1
200 TABLET, ORALLY DISINTEGRATING ORAL 2 TIMES DAILY
Status: DISCONTINUED | OUTPATIENT
Start: 2021-04-08 | End: 2021-04-08

## 2021-04-08 RX ORDER — LIDOCAINE 40 MG/G
CREAM TOPICAL
Status: DISCONTINUED | OUTPATIENT
Start: 2021-04-08 | End: 2021-04-16 | Stop reason: HOSPADM

## 2021-04-08 RX ADMIN — SERTRALINE HYDROCHLORIDE 200 MG: 100 TABLET ORAL at 20:23

## 2021-04-08 RX ADMIN — TRAZODONE HYDROCHLORIDE 100 MG: 50 TABLET ORAL at 21:28

## 2021-04-08 RX ADMIN — HYDROXYZINE HYDROCHLORIDE 25 MG: 25 TABLET, FILM COATED ORAL at 20:23

## 2021-04-08 RX ADMIN — ERGOCALCIFEROL 50000 UNITS: 1.25 CAPSULE, LIQUID FILLED ORAL at 14:38

## 2021-04-08 RX ADMIN — ACETAMINOPHEN 500 MG: 500 TABLET, FILM COATED ORAL at 18:53

## 2021-04-08 RX ADMIN — LAMOTRIGINE 100 MG: 100 TABLET, ORALLY DISINTEGRATING ORAL at 20:23

## 2021-04-08 ASSESSMENT — ACTIVITIES OF DAILY LIVING (ADL)
ADLS_ACUITY_SCORE: 17

## 2021-04-08 ASSESSMENT — MIFFLIN-ST. JEOR: SCORE: 2054.81
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
## 2021-04-08 NOTE — PLAN OF CARE
Pt new admit from home. VSS. Denies pain. Neuros intact except decreased sensation on left cheek. EEG leads placed. No events this shift. Good po intake. Voiding, last BM 4/7. Up SBA and GB. SO at bedside. Pt is able to make needs known.       Arrived from:  Home  Belongings/meds:  2 backpacks, clothes, shoes, phone. Medications sent home with SO  2 RN Skin Assessment Completed by:  Allyssa MAYERS and Rakel CASTORENA  Non-intact findings documented (yes/no/NA): WNL

## 2021-04-09 ENCOUNTER — APPOINTMENT (OUTPATIENT)
Dept: NEUROLOGY | Facility: CLINIC | Age: 36
DRG: 101 | End: 2021-04-09
Attending: PHYSICIAN ASSISTANT
Payer: COMMERCIAL

## 2021-04-09 PROCEDURE — 95714 VEEG EA 12-26 HR UNMNTR: CPT

## 2021-04-09 PROCEDURE — 120N000002 HC R&B MED SURG/OB UMMC

## 2021-04-09 PROCEDURE — 99231 SBSQ HOSP IP/OBS SF/LOW 25: CPT | Performed by: PHYSICIAN ASSISTANT

## 2021-04-09 PROCEDURE — 95720 EEG PHY/QHP EA INCR W/VEEG: CPT | Performed by: PSYCHIATRY & NEUROLOGY

## 2021-04-09 PROCEDURE — 250N000013 HC RX MED GY IP 250 OP 250 PS 637: Performed by: PHYSICIAN ASSISTANT

## 2021-04-09 RX ORDER — PEDI MULTIVIT NO.25/FOLIC ACID 300 MCG
2 TABLET,CHEWABLE ORAL EVERY MORNING
Status: DISCONTINUED | OUTPATIENT
Start: 2021-04-10 | End: 2021-04-16 | Stop reason: HOSPADM

## 2021-04-09 RX ADMIN — LAMOTRIGINE 100 MG: 100 TABLET, ORALLY DISINTEGRATING ORAL at 07:42

## 2021-04-09 RX ADMIN — TRAZODONE HYDROCHLORIDE 100 MG: 50 TABLET ORAL at 23:04

## 2021-04-09 RX ADMIN — Medication 1 TABLET: at 07:42

## 2021-04-09 RX ADMIN — HYDROXYZINE HYDROCHLORIDE 25 MG: 25 TABLET, FILM COATED ORAL at 21:18

## 2021-04-09 RX ADMIN — Medication 1000 MCG: at 07:42

## 2021-04-09 RX ADMIN — SERTRALINE HYDROCHLORIDE 200 MG: 100 TABLET ORAL at 21:18

## 2021-04-09 ASSESSMENT — ACTIVITIES OF DAILY LIVING (ADL)
ADLS_ACUITY_SCORE: 15
ADLS_ACUITY_SCORE: 15
ADLS_ACUITY_SCORE: 17
ADLS_ACUITY_SCORE: 15

## 2021-04-09 NOTE — PROGRESS NOTES
"Time/length of seizure event: 2-3 minutes  Movements (head, eyes, extremities): patient turned onto his R side and his hands/arms were stiffened/clenched in close to his body  Orientation during seizure: LAUREN  After seizure, remembers the unique phrase given during seizure: No  After seizure, remembers an unnamed visual object shown during seizure: No  Able to identify/name aloud item during seizure: No  Able to follow command during seizure: No  Able to read test sentence aloud during seizure: No  Able to read test sentence aloud again after the seizure: Yes  After seizure, remembers name of object shown during seizure: No  Orientation and level of consciousness after seizure: A&Ox4  VS and oxygen saturation during/after seizure: WNL  Recall of the event?: No  Was this a typical seizure/event?: Yes  Presence of aura or pre-seizure activity:  Incontinence: No    Patient described feeling \"foggy\", very warm and having nausea just prior to his event. After the event is feeling fatigued.  "

## 2021-04-09 NOTE — PLAN OF CARE
Status: Patient admitted 4/8 for VEEG monitoring. Seizures began in 01/2021. See previous notes for history.   Vitals: VSS, slightly bradycardic, on RA  Neuros: A&Ox4, PERRLA, patient has flexuating n/t in face, arms and legs, strength 5/5  IV: PIV SL  Resp/trach: WNL  Diet: Regular diet  Bowel status: Last BM 4/7 or 4/8, per patient  : Voiding spontaneously  Skin: WNL  Pain: Patient denied  Activity: Up with gait belt and SBA, seizure precautions  Social: Fiance was present, supportive  Updates this shift: No episodes this shift, continue to monitor

## 2021-04-09 NOTE — PLAN OF CARE
VSS except bradycardia in the 50's at rest.  Denied pain.  Neuros intact except decreased sensation to left cheek.  VEEG leads in place; no events witnessed or reported.  Seizure precautions in place.  R PIV SL.  On regular diet.  Voiding spontaneously.  Last BM 4/7.  Up SBA and GB; pt reports baseline intermittent dizziness when up.  Pt on decreased dose of PTA Lamictal.  BG to be checked with all events.  Continue with POC.

## 2021-04-09 NOTE — PROGRESS NOTES
"CLINICAL NUTRITION SERVICES - ASSESSMENT NOTE     Nutrition Prescription    RECOMMENDATIONS FOR MDs/PROVIDERS TO ORDER:  None at this time     Malnutrition Status:    Patient does not meet two of the established criteria necessary for diagnosing malnutrition    Recommendations already ordered by Registered Dietitian (RD):  Snack: Vanilla yogurt and cheerios at 10am, orange at 2pm  Flinstonxin vitamin- increase to 2 tablets per day     Future/Additional Recommendations:  Monitor PO intake and acceptance of snacks.      REASON FOR ASSESSMENT  Javi Brown is a/an 35 year old male assessed by the dietitian for RN Consult - Pt has history of gastric bypass, having issues following diet    Clinical History: depression, anxiety, borderline personality disorder and PTSD who is being admitted for characterization of seizures.     NUTRITION HISTORY  Pt seen by a [facility] RD on 3/3/21. Per chart review, pt S/P leighton-en-y gastric bypass surgery on 1/25/16. Per RD note pt was working on planning healthy meals with whole grains.  Javi reports that his appetite has been good. He's been working on following a healthier diet but reports this has been difficult. Pt reports that he has been focusing on eating mostly protein, fruits, vegetables, and whole grains with a small amount of sweets. Javi reports that he tries to eat 6 small meals a day.   Takes 2 Flinstone multivitamins, Vit B12, and Vit D at home.     CURRENT NUTRITION ORDERS  Diet: Regular  Intake/Tolerance: Good appetite since admit.     LABS  Labs reviewed    MEDICATIONS  Medications reviewed  Flinstones complete w/ iron 1x per day  Vit B12 1,000 mcg sublingual  Vit D2 50,000 units weekly     ANTHROPOMETRICS  Height: 180.3 cm (5' 11\")  Most Recent Weight: 110.8 kg (244 lb 3.2 oz)    IBW: 78.2 kg (142%)   BMI: Obesity Grade I BMI 30-34.9  Weight History: Weight increasing over the past year.   Wt Readings from Last 15 Encounters:   04/08/21 110.8 kg (244 lb 3.2 oz) "   03/15/21 107.6 kg (237 lb 3.2 oz)   05/04/20 99.3 kg (219 lb)   03/28/18 92.5 kg (204 lb)     Dosing Weight: 86 kg (adjusted based on admit wt of 110.8 kg on 4/8 and IBW of 78.2 kg)     ASSESSED NUTRITION NEEDS  Estimated Energy Needs: 9943-2883 kcals/day (20 - 25 kcals/kg)  Justification: Obese  Estimated Protein Needs: 105-130 grams protein/day (1.2 - 1.5 grams of pro/kg)  Justification: Obesity guidelines  Estimated Fluid Needs: (1 mL/kcal)   Justification: Maintenance    PHYSICAL FINDINGS  See malnutrition section below.    MALNUTRITION  % Intake: No decreased intake noted  % Weight Loss: None noted  Subcutaneous Fat Loss: None observed  Muscle Loss: None observed  Fluid Accumulation/Edema: None noted  Malnutrition Diagnosis: Patient does not meet two of the established criteria necessary for diagnosing malnutrition    NUTRITION DIAGNOSIS  Predicted excessive nutrient intake (calories) related to good appetite currently as evidenced by weight gain over the past year and BMI of 34 kg/m^2      INTERVENTIONS  Implementation  Nutrition Education: Discussed current appetite, role of RD, and menu options. Encouraged pt to focus on choosing a protein, vegetable/fruit, and a whole grain with each meal.    Modify composition of meals/snacks: Discussed snack options.      Goals  Patient to consume % of nutritionally adequate meal trays TID, or the equivalent with supplements/snacks.     Monitoring/Evaluation  Progress toward goals will be monitored and evaluated per protocol.    Viviana Marie RD, LD  Neuro RD pager [phone]

## 2021-04-09 NOTE — PROGRESS NOTES
"SPIRITUAL HEALTH SERVICES  [facility] ([place]) 6A  ON-CALL VISIT     REFERRAL SOURCE: Initial on-call  visit with patient Javi Brown and radha Lisa, per request for hospital  visit as noted in initial nursing assessment.     Pt very pleasant, engaging, welcomed spiritual support. Pt talked about:     recent history of seizure activity, his \"many\" hospital admissions, his reasons for requesting transfer of his care from [place] to [facility]     the many changes/transitions/stressors in his life: his health issues, recently becoming engaged to radha, buying a house and renovating it     his spiritual background (\"I was raised in the [place] [employer] tradition, Maria L is Anglican, so we've got some things to figure out regarding our wedding!\")     his enjoyment of running road races   his spiritual practice of using particular scripture verses as a sort of \"mantra\" to repeat in his mind when he is stressed or in need of spiritual support    Pt welcomed a brief prayer - I shared a chorus of a Taoism hymn as a prayer for pt.     PLAN: continue to follow, will check in on pt again early next week if pt still on unit.     Morgan Whitaker) Jono Camargo M.Div., [facility]  Staff   Pager [phone]     * [facility] remains available 24/7 for emergent requests/referrals, either by having the switchboard page the on-call  or by entering an ASAP/STAT consult in Epic (this will also page the on-call ). Routine Epic consults receive an initial response within 24 hours.*                                                                                          "

## 2021-04-09 NOTE — PLAN OF CARE
Status: Patient is here for characterization of seizures. One event reported/witnessed this shift. See previous note  Vitals: VSS on RA  Neuros: A&Ox4. Dysconjugate gaze. Decreased sensation to L cheek.    IV: PIV SL  Resp/trach: WNL  Diet: Regular diet  Bowel status: No BM this shift  : Voiding spontaneously  Skin: Intact  Pain: PRN Tylenol given for HA  Activity: Up with SBA/ GB  Plan: Continue to monitor and follow POC

## 2021-04-09 NOTE — PROGRESS NOTES
"[facility], [place]   Epilepsy Service Daily Note      Interval History:   Had event yesterday at 1837. Recalls feeling weird prior. Had maria del carmen vu and dreamy feeling and felt panicky. After event he was very tired. He believes this was a typical event.  Glucose checked and no hypoglycemia noted. No major complaints.     Review of System:   No nausea, No vomiting, no headaches, no dizziness, no chest pain.     Medications:   Antiepileptic Medications Home Doses: lamotrigine -200  Antiepileptic Medications Current Doses: lamotrigine -100    Exam: Blood pressure 118/70, pulse 57, temperature 97.3  F (36.3  C), temperature source Oral, resp. rate 16, height 1.803 m (5' 11\"), weight 110.8 kg (244 lb 3.2 oz), SpO2 96 %.   General: NAD  Neuro: Alert and oriented. Speech fluent. Hearing intact to normal conversation. Pupils equal, round, and reactive to light. Dysconjugate gaze. EOM's intact. Visual fields intact. Strong shoulder shrug. Face symmetric, tongue midline. Strength 5/5 bilaterally. No drift or pronation. Intact FNF. Sensation intact to light touch but he reports decreased sensation on left arm compared to right arm as well as right leg compared to left leg. Facial sensation equal bilaterally.      EEG: no electrographic seizures thus far.     Assessment and Plan: Patient seen and discussed with Dr. Claudio Elizabeth. Patient is a 35 year old right-handed male with a history of depression, anxiety, borderline personality disorder and PTSD who is being admitted for characterization of seizures. One event recorded thus far without EEG correlate to indicate seizure. Need to continue to record more of his target seizures.     -continue vEEG monitoring  -stop lamotrigine       Kitty Ching PA-C    Type of target event identified: event with no loss of awareness, staring spell with altered awareness and convulsion   Number of events: more needed  Discharge medication plan: To be " decided  Further Imaging studies needed prior to discharge: No imaging required prior to discharge  Discharge transportation: not discussed  Other pertinent issues/goals for discharge: not at this time     Total time: 20 minute was spent in the care of this patient. The patient agrees with the above mentioned plan of care. I answered all the patient's questions and addressed immediate concerns. More than 50% of time spent consisted of counseling and coordinating care, including discussion of the diagnostic significance of EEG findings, anti-seizure medication management, and planning for discharge home

## 2021-04-10 ENCOUNTER — APPOINTMENT (OUTPATIENT)
Dept: NEUROLOGY | Facility: CLINIC | Age: 36
DRG: 101 | End: 2021-04-10
Attending: PHYSICIAN ASSISTANT
Payer: COMMERCIAL

## 2021-04-10 LAB
GLUCOSE BLDC GLUCOMTR-MCNC: 153 MG/DL (ref 70–99)
LAMOTRIGINE SERPL-MCNC: 6.8 UG/ML (ref 2.5–15)

## 2021-04-10 PROCEDURE — 250N000013 HC RX MED GY IP 250 OP 250 PS 637: Performed by: PHYSICIAN ASSISTANT

## 2021-04-10 PROCEDURE — 250N000013 HC RX MED GY IP 250 OP 250 PS 637: Performed by: PSYCHIATRY & NEUROLOGY

## 2021-04-10 PROCEDURE — 99231 SBSQ HOSP IP/OBS SF/LOW 25: CPT | Mod: 25 | Performed by: PSYCHIATRY & NEUROLOGY

## 2021-04-10 PROCEDURE — 120N000002 HC R&B MED SURG/OB UMMC

## 2021-04-10 PROCEDURE — 95720 EEG PHY/QHP EA INCR W/VEEG: CPT | Performed by: PSYCHIATRY & NEUROLOGY

## 2021-04-10 PROCEDURE — 95714 VEEG EA 12-26 HR UNMNTR: CPT

## 2021-04-10 PROCEDURE — 999N001017 HC STATISTIC GLUCOSE BY METER IP

## 2021-04-10 RX ADMIN — HYDROXYZINE HYDROCHLORIDE 25 MG: 25 TABLET, FILM COATED ORAL at 21:02

## 2021-04-10 RX ADMIN — Medication 1000 MCG: at 07:52

## 2021-04-10 RX ADMIN — SERTRALINE HYDROCHLORIDE 200 MG: 100 TABLET ORAL at 21:02

## 2021-04-10 RX ADMIN — Medication 2 TABLET: at 07:52

## 2021-04-10 ASSESSMENT — ACTIVITIES OF DAILY LIVING (ADL)
ADLS_ACUITY_SCORE: 15

## 2021-04-10 NOTE — PLAN OF CARE
"Time/length of seizure event: 2:20pm, 10 minutes  Movements (head, eyes, extremities): pt laying with eyes closed  Orientation during seizure:oriented, but slow to respond  After seizure, remembers the unique phrase given during seizure: yes  After seizure, remembers an unnamed visual object shown during seizure: yes  Able to identify/name aloud item during seizure: yes  Able to follow command during seizure: yes  Able to read test sentence aloud during seizure: partially -- \"they heard him speak on the radio\"  Able to read test sentence aloud again after the seizure:   After seizure, remembers name of object shown during seizure: yes  Orientation and level of consciousness after seizure:A/o x4  VS and oxygen saturation during/after seizure: WNL see flowsheet  Recall of the event?:yes  Was this a typical seizure/event?:yes/aura  Presence of aura or pre-seizure activity:  Incontinence:no    Pt was on his way back from the bathroom when he started having a \"werid feeling\", feeling \"uneasy\". He got back into bed with assistance, and laid on his side until the feeling subsided.     His vitals remained WNL.       "

## 2021-04-10 NOTE — PLAN OF CARE
Admitted for seizure characterization. No events witnessed or reported overnight. Neuros: disconjugate gaze,decreased sensation left side body. PIV SL. Regular diet. Voids. No BM overnight. Up SBA/GB. VSS on RA. Off Lamictal. Continue to monitor.

## 2021-04-10 NOTE — PLAN OF CARE
Status: Pt here for video EEG monitoring  Vitals: VSS on RA  Neuros: Alert and oriented x4. Dysconjugate gaze. Decreased sensation to L side of face, LUE and L LLE.  IV: PIV SL'd  Labs/Electrolytes: WNL  Resp/trach: WNL on RA  Diet: Regular diet  Bowel status: Bs+x4. No BM today. Pt c/o stomach pain this evening, declined intervention. BS+x4, denied nausea. Stomach pain resolved per pt.  : Voiding  Skin: EEG leads intact.  Pain: C/o stomach pain this evening, declined intervention, please continue to monitor.  Activity: Up with SBA and GB. Ambulated in halls this evening.  Plan: Continue to monitor and follow POC. Off PTA lamictal. Glucose to be checked with each event.

## 2021-04-10 NOTE — PROGRESS NOTES
"[facility], [place]   Epilepsy Service Daily Note      Interval History:   Patient is not able to recall events overnight.    Review of System:   No nausea, No vomiting, no headaches, no dizziness, no chest pain.     Medications:   Antiepileptic Medications Home Doses: lamotrigine -200  Antiepileptic Medications Current Doses: lamotrigine none    Exam: Blood pressure 131/65, pulse 74, temperature 96.5  F (35.8  C), temperature source Oral, resp. rate 14, height 1.803 m (5' 11\"), weight 110.8 kg (244 lb 3.2 oz), SpO2 95 %.   General: NAD  Neuro: Alert and oriented. Speech fluent. Hearing intact to normal conversation.  Dysconjugate gaze. EOM's intact. Visual fields intact. Face symmetric, tongue midline. No drift or pronation. Intact FNF.     EEG: no electrographic seizures thus far.     Assessment and Plan:   35 year old right-handed male with a history of depression, anxiety, borderline personality disorder and PTSD who is being admitted for characterization of seizures. One event recorded thus far without EEG correlate to indicate seizure. Need to continue to record more of his target seizures.     -continue vEEG monitoring  -no antiepileptic drug   - Sleep deprive tonight. Stay awake until 4 am 4/11/21, sleep for 2 hours, then stay awake until 10 pm 4/11/21. No naps. May discontinue if the patient has a convulsion. This was reviewed with patient and he/she is agreeable with this plan.       Lesly Snyder MD     Type of target event identified: event with no loss of awareness, staring spell with altered awareness and convulsion   Number of events: more needed  Discharge medication plan: To be decided  Further Imaging studies needed prior to discharge: No imaging required prior to discharge  Discharge transportation: not discussed  Other pertinent issues/goals for discharge: not at this time     Total time: 10 minute was spent in the care of this patient. The patient agrees with " the above mentioned plan of care. I answered all the patient's questions and addressed immediate concerns. More than 50% of time spent consisted of counseling and coordinating care, including discussion of the diagnostic significance of EEG findings, anti-seizure medication management, and planning for discharge home

## 2021-04-11 ENCOUNTER — APPOINTMENT (OUTPATIENT)
Dept: NEUROLOGY | Facility: CLINIC | Age: 36
DRG: 101 | End: 2021-04-11
Attending: PHYSICIAN ASSISTANT
Payer: COMMERCIAL

## 2021-04-11 LAB
GLUCOSE BLDC GLUCOMTR-MCNC: 102 MG/DL (ref 70–99)
GLUCOSE BLDC GLUCOMTR-MCNC: 186 MG/DL (ref 70–99)
GLUCOSE BLDC GLUCOMTR-MCNC: 75 MG/DL (ref 70–99)
GLUCOSE BLDC GLUCOMTR-MCNC: 80 MG/DL (ref 70–99)
GLUCOSE BLDC GLUCOMTR-MCNC: 85 MG/DL (ref 70–99)
GLUCOSE BLDC GLUCOMTR-MCNC: 90 MG/DL (ref 70–99)
GLUCOSE BLDC GLUCOMTR-MCNC: 91 MG/DL (ref 70–99)
GLUCOSE BLDC GLUCOMTR-MCNC: 95 MG/DL (ref 70–99)

## 2021-04-11 PROCEDURE — 120N000002 HC R&B MED SURG/OB UMMC

## 2021-04-11 PROCEDURE — 95714 VEEG EA 12-26 HR UNMNTR: CPT

## 2021-04-11 PROCEDURE — 250N000011 HC RX IP 250 OP 636: Performed by: STUDENT IN AN ORGANIZED HEALTH CARE EDUCATION/TRAINING PROGRAM

## 2021-04-11 PROCEDURE — 999N001017 HC STATISTIC GLUCOSE BY METER IP

## 2021-04-11 PROCEDURE — 250N000012 HC RX MED GY IP 250 OP 636 PS 637: Performed by: PHYSICIAN ASSISTANT

## 2021-04-11 PROCEDURE — 250N000013 HC RX MED GY IP 250 OP 250 PS 637: Performed by: PSYCHIATRY & NEUROLOGY

## 2021-04-11 PROCEDURE — 95720 EEG PHY/QHP EA INCR W/VEEG: CPT | Mod: GC | Performed by: PSYCHIATRY & NEUROLOGY

## 2021-04-11 PROCEDURE — 999N000127 HC STATISTIC PERIPHERAL IV START W US GUIDANCE

## 2021-04-11 PROCEDURE — 250N000013 HC RX MED GY IP 250 OP 250 PS 637: Performed by: PHYSICIAN ASSISTANT

## 2021-04-11 PROCEDURE — 99231 SBSQ HOSP IP/OBS SF/LOW 25: CPT | Mod: 25 | Performed by: PSYCHIATRY & NEUROLOGY

## 2021-04-11 RX ORDER — ONDANSETRON 4 MG/1
4-8 TABLET, FILM COATED ORAL EVERY 6 HOURS PRN
Status: DISCONTINUED | OUTPATIENT
Start: 2021-04-11 | End: 2021-04-16 | Stop reason: HOSPADM

## 2021-04-11 RX ADMIN — Medication 1000 MCG: at 08:29

## 2021-04-11 RX ADMIN — HYDROXYZINE HYDROCHLORIDE 25 MG: 25 TABLET, FILM COATED ORAL at 19:35

## 2021-04-11 RX ADMIN — Medication 2 TABLET: at 08:31

## 2021-04-11 RX ADMIN — ONDANSETRON HYDROCHLORIDE 4 MG: 4 TABLET, FILM COATED ORAL at 03:40

## 2021-04-11 RX ADMIN — SERTRALINE HYDROCHLORIDE 200 MG: 100 TABLET ORAL at 19:35

## 2021-04-11 RX ADMIN — SUMATRIPTAN SUCCINATE 6 MG: 6 INJECTION SUBCUTANEOUS at 20:38

## 2021-04-11 RX ADMIN — TRAZODONE HYDROCHLORIDE 100 MG: 50 TABLET ORAL at 22:06

## 2021-04-11 ASSESSMENT — VISUAL ACUITY: OU: NORMAL ACUITY;GLASSES

## 2021-04-11 ASSESSMENT — ACTIVITIES OF DAILY LIVING (ADL)
ADLS_ACUITY_SCORE: 15

## 2021-04-11 NOTE — PROGRESS NOTES
"[facility], [place]   Epilepsy Service Daily Note      Interval History:   Javi had several non epileptic spells.     Review of System:   No nausea, No vomiting, no headaches, no dizziness, no chest pain.     Medications:   Antiepileptic Medications Home Doses: lamotrigine -200  Antiepileptic Medications Current Doses: lamotrigine none    Exam: Blood pressure 120/80, pulse 69, temperature 96.6  F (35.9  C), temperature source Oral, resp. rate 16, height 1.803 m (5' 11\"), weight 110.8 kg (244 lb 3.2 oz), SpO2 94 %.   General: NAD  Neuro: Alert and oriented. Speech fluent. Hearing intact to normal conversation.  Dysconjugate gaze. EOM's intact. Visual fields intact. Face symmetric, tongue midline. No drift or pronation. Intact FNF.     EEG: no electrographic seizures thus far. Several non epileptic spell with no EEG seizure correlate.     Assessment and Plan:   35 year old right-handed male with a history of depression, anxiety, borderline personality disorder and PTSD who is being admitted for characterization of seizures. One event recorded thus far without EEG correlate to indicate seizure. Need to continue to record more of his target seizures.     -continue vEEG monitoring  -no antiepileptic drug   - Stay awake until 10 pm 4/11/21. No naps. May discontinue if the patient has a convulsion. This was reviewed with patient and he/she is agreeable with this plan.   - discharge on Tuesday/wenesday.   - he will need work note (no ladder or cooking with hot oven until spells stop)  - send home on same lamotrigine dose      Lesly Snyder MD     Type of target event identified: event with no loss of awareness, staring spell with altered awareness and convulsion   Number of events: more needed  Discharge medication plan: To be decided  Further Imaging studies needed prior to discharge: No imaging required prior to discharge  Discharge transportation: not discussed  Other pertinent " issues/goals for discharge: not at this time     Total time: 20 minute was spent in the care of this patient. The patient agrees with the above mentioned plan of care. I answered all the patient's questions and addressed immediate concerns. More than 50% of time spent consisted of counseling and coordinating care, including discussion of the diagnostic significance of EEG findings, anti-seizure medication management, and planning for discharge home

## 2021-04-11 NOTE — PLAN OF CARE
Status: Pt on 6A for seizure monitoring and characterization.   VS: VSS  Neuros: Dysconjugate gaze, otherwise neuros intact  GI: Last BM 4/10  : Voiding spontaneously  IV: PIV SL  Activity: Up with SBA/gb; ambulated in wood x1 this shift  Pain: Denies pain  Respiratory: LS clear, equal throughout  Drains/lines/skin: Skin CDI; EEG leads intact  Labs/Tests: WNL  New this shift: one event this shift, see note  Social: no visitors this shift  Plan of care:  Continue with current POC

## 2021-04-11 NOTE — PROGRESS NOTES
Time/length of seizure event:0435/ 5 minutes  Movements (head, eyes, extremities):Rhythmic jerking of left arm and right leg. Eyes closed  Orientation during seizure: LAUREN  After seizure, remembers the unique phrase given during seizure:No  After seizure, remembers an unnamed visual object shown during seizure:No  Able to identify/name aloud item during seizure:No  Able to follow command during seizure:No  Able to read test sentence aloud during seizure:No  Able to read test sentence aloud again after the seizure:No  After seizure, remembers name of object shown during seizure:No  Orientation and level of consciousness after seizure:Lethargic, oriented to self and place  VS and oxygen saturation during/after seizure:VSS on RA  Recall of the event?:No  Was this a typical seizure/event?:Yes  Presence of aura or pre-seizure activity:Yes-pt reports not feeling well and pushed button.  Incontinence:No     MD notified of event by charge RN

## 2021-04-11 NOTE — PLAN OF CARE
Status: Pt here for video EEG monitoring. No events witnessed or reported this shift.  Vitals: VSS on RA  Neuros: Alert and oriented x4. Dysconjugate gaze. Decreased sensation to L side of face, LUE and L LLE. Pt updated nurse that right ear ringing from [id number] and resolved.  IV: PIV SL'd  Labs/Electrolytes: WNL  Resp/trach: WNL on RA  Diet: Regular diet  Bowel status: Bs+x4. +BM today.  : Voiding  Skin: EEG leads intact.  Pain: Denied.  Activity: Up with SBA and GB. Ambulated in halls this evening.  Plan: Continue to monitor and follow POC. Off PTA lamictal. Glucose to be checked with each event.

## 2021-04-11 NOTE — PLAN OF CARE
"Time/length of seizure event: 0915, 10 minutes  Movements (head, eyes, extremities): Stiff left arm, rolled on to side, eyes closed  Orientation during seizure: unable to respond at beginning, but once he was able to respond was ox4  After seizure, remembers the unique phrase given during seizure: partially -- \"purple\" from \"purple dragon\"  After seizure, remembers an unnamed visual object shown during seizure: Yes  Able to identify/name aloud item during seizure: yes  Able to follow command during seizure: yes  Able to read test sentence aloud during seizure: yes  Able to read test sentence aloud again after the seizure: yes  After seizure, remembers name of object shown during seizure: yes  Orientation and level of consciousness after seizure: ox4  VS and oxygen saturation during/after seizure: WNL see flowsheet  Recall of the event?: yes  Was this a typical seizure/event?: yes  Presence of aura or pre-seizure activity: yes, uneasy feeling  Incontinence: no    Blood sugar taken during event: 75, hypoglycemia protocol followed.     Patient pushed event button, RN arrived in room to patient laying on his right side, not responding, but able to respond to orientation questions after about two minutes. L arm very stiff.       "

## 2021-04-11 NOTE — PROGRESS NOTES
Status: On 6A for seizure chacterization. Had one event this shift: had type 2 seizure episode at 0435 lasting 5 minutes. See previous note. Suction and O2 at BS. SZ precautions maintained.   Vitals: VSS on RA  Neuros: AOx4. Dysconjugate gaze. C/O slight cramps in BLE.  IV: R PIV removed due to circular redness around site. New PIV applied to L lower posterior hand.  Resp/trach: WNL  Diet: Regular diet  Bowel status: Bowel sound in all 4 quadrants. Last BM on 4/10/21  : Voided x2 on NOC shift  Skin: EEG leads intact.  Pain: Denies pain.  Activity: SBA with GB. Ambulated in wood and bathroom on NOC shift. Independent bed bath on shift  Plan: Continue to monitor and follow POC  Updates this shift:      Type 2 seizure episode at 0435 witnessed by LULA Phillips. FSG, vitals, and neuros completed. MD notified.

## 2021-04-12 ENCOUNTER — APPOINTMENT (OUTPATIENT)
Dept: NEUROLOGY | Facility: CLINIC | Age: 36
DRG: 101 | End: 2021-04-12
Attending: PHYSICIAN ASSISTANT
Payer: COMMERCIAL

## 2021-04-12 PROCEDURE — 250N000013 HC RX MED GY IP 250 OP 250 PS 637: Performed by: PHYSICIAN ASSISTANT

## 2021-04-12 PROCEDURE — 99231 SBSQ HOSP IP/OBS SF/LOW 25: CPT | Mod: 25 | Performed by: PHYSICIAN ASSISTANT

## 2021-04-12 PROCEDURE — 95720 EEG PHY/QHP EA INCR W/VEEG: CPT | Mod: GC | Performed by: PSYCHIATRY & NEUROLOGY

## 2021-04-12 PROCEDURE — 120N000002 HC R&B MED SURG/OB UMMC

## 2021-04-12 PROCEDURE — 95714 VEEG EA 12-26 HR UNMNTR: CPT

## 2021-04-12 PROCEDURE — 250N000013 HC RX MED GY IP 250 OP 250 PS 637: Performed by: PSYCHIATRY & NEUROLOGY

## 2021-04-12 RX ADMIN — SERTRALINE HYDROCHLORIDE 200 MG: 100 TABLET ORAL at 21:04

## 2021-04-12 RX ADMIN — Medication 2 TABLET: at 08:31

## 2021-04-12 RX ADMIN — Medication 1000 MCG: at 08:31

## 2021-04-12 RX ADMIN — HYDROXYZINE HYDROCHLORIDE 25 MG: 25 TABLET, FILM COATED ORAL at 21:04

## 2021-04-12 ASSESSMENT — ACTIVITIES OF DAILY LIVING (ADL)
ADLS_ACUITY_SCORE: 15

## 2021-04-12 NOTE — PROGRESS NOTES
"[facility], [place]   Epilepsy Service Daily Note      Interval History:   Had events yesterday around 0400 and 0900. With the first he recalls feeling of maria del carmen vu and feeling warm. No associated seizure discharges on EEG with events thus far.     Review of System:   No nausea, No vomiting, no headaches, no dizziness, no chest pain.     Medications:   Antiepileptic Medications Home Doses: lamotrigine -200  Antiepileptic Medications Current Doses: none     Exam: Blood pressure 122/77, pulse 66, temperature 98  F (36.7  C), temperature source Oral, resp. rate 16, height 1.803 m (5' 11\"), weight 110.8 kg (244 lb 3.2 oz), SpO2 99 %.   General: NAD  Neuro: Alert and oriented. Speech fluent. Hearing intact to normal conversation. Pupils equal, round, and reactive to light. Dysconjugate gaze. EOM's intact. Visual fields intact. Strong shoulder shrug. Face symmetric, tongue midline. Strength 5/5 bilaterally. No drift or pronation. Intact FNF. Sensation intact to light touch but today he reports decreased sensation on left arm and left leg. Facial sensation equal bilaterally.      EEG: no electrographic seizures thus far.     Assessment and Plan: Patient seen and discussed with Dr. Claudio Elizabeth. Patient is a 35 year old right-handed male with a history of depression, anxiety, borderline personality disorder and PTSD who is being admitted for characterization of seizures. Several events recorded thus far without EEG correlate to indicate seizure. Glucose checked and no significant hypoglycemia (once was 75). An outside EEG from [place] reported a subclinical seizure on EEG in 1/2021.     -continue vEEG monitoring  -continue off lamotrigine   -sleep deprive tonight       Kitty Ching PA-C    Type of target event identified: event with no loss of awareness, staring spell with altered awareness and convulsion   Number of events: more needed  Discharge medication plan: To be decided  Further Imaging " studies needed prior to discharge: No imaging required prior to discharge  Discharge transportation: not discussed  Other pertinent issues/goals for discharge: not at this time     Total time: 20 minute was spent in the care of this patient. The patient agrees with the above mentioned plan of care. I answered all the patient's questions and addressed immediate concerns. More than 50% of time spent consisted of counseling and coordinating care, including discussion of the diagnostic significance of EEG findings, anti-seizure medication management, and planning for discharge home

## 2021-04-12 NOTE — PLAN OF CARE
Status: Pt here for video EEG monitoring. No events witnessed or reported this shift.  Vitals: VSS on RA  Neuros: Alert and oriented x4. Dysconjugate gaze. Decreased sensation to LLE.   IV: PIV SL'd  Resp/trach: WNL on RA  Diet: Regular diet  Bowel status: Bs+x4. +BM today.  : Voiding  Skin: EEG leads intact.  Pain: C/o HA, requested imitrex, given and effective.  Activity: Up with SBA and GB. Ambulated in halls this evening.  Plan: Continue to monitor and follow POC. Off PTA lamictal. Glucose to be checked with each event.

## 2021-04-12 NOTE — PLAN OF CARE
"Time 8494-2701      Reason for admission:   Seizures (H) [R56.9]     Vitals: /69 (BP Location: Left arm)   Pulse 69   Temp 98  F (36.7  C) (Oral)   Resp 16   Ht 1.803 m (5' 11\")   Wt 110.8 kg (244 lb 3.2 oz)   SpO2 100%   BMI 34.06 kg/m       Activity: Up SBA to bathroom   Pain: denies   Neuro: A&Ox4   Cardiac: WDL   Respiratory: WDL on RA   GI/: voiding regularly 2x and 1x BM this shift   Diet: Regular and tolerating well  Lines: L PIV SL  Skin/Wounds: intact EEG leads in place        This shift:     Plan for scheduled sleep depravation tonight. Continue to monitor and follow POC    "

## 2021-04-12 NOTE — PROGRESS NOTES
Status: Pt here for video EEG monitoring. No events witnessed or reported this shift  Vitals: VSS on RA  Neuros: A&Ox4.  Dysconjugate gaze.  Decreased sensation to LLE.    IV: PIV SL  Resp/trach: WDL  Diet: regular diet   Bowel status: LBM 4/11  : Voiding spont  Skin: EEg leads intact.    Pain: denies  Activity: up with SBA, GB. Declined to walk in wood, this shift.  Plan: Continue with current POC.  Off PTA lamictal. Glucose to be checked with each event.  Updates this shift: Grey tubing, that attaches to black seizure tubing bag, is broken at the insertion point.  EEG techs have been notified by previous shift RN.

## 2021-04-13 ENCOUNTER — MYC MEDICAL ADVICE (OUTPATIENT)
Dept: NEUROLOGY | Facility: CLINIC | Age: 36
End: 2021-04-13

## 2021-04-13 ENCOUNTER — APPOINTMENT (OUTPATIENT)
Dept: NEUROLOGY | Facility: CLINIC | Age: 36
DRG: 101 | End: 2021-04-13
Attending: PHYSICIAN ASSISTANT
Payer: COMMERCIAL

## 2021-04-13 LAB — GLUCOSE BLDC GLUCOMTR-MCNC: 160 MG/DL (ref 70–99)

## 2021-04-13 PROCEDURE — 95720 EEG PHY/QHP EA INCR W/VEEG: CPT | Mod: GC | Performed by: PSYCHIATRY & NEUROLOGY

## 2021-04-13 PROCEDURE — 120N000002 HC R&B MED SURG/OB UMMC

## 2021-04-13 PROCEDURE — 250N000013 HC RX MED GY IP 250 OP 250 PS 637: Performed by: PSYCHIATRY & NEUROLOGY

## 2021-04-13 PROCEDURE — 999N001017 HC STATISTIC GLUCOSE BY METER IP

## 2021-04-13 PROCEDURE — 99231 SBSQ HOSP IP/OBS SF/LOW 25: CPT | Mod: 25 | Performed by: PHYSICIAN ASSISTANT

## 2021-04-13 PROCEDURE — 95714 VEEG EA 12-26 HR UNMNTR: CPT

## 2021-04-13 PROCEDURE — 250N000013 HC RX MED GY IP 250 OP 250 PS 637: Performed by: PHYSICIAN ASSISTANT

## 2021-04-13 RX ADMIN — HYDROXYZINE HYDROCHLORIDE 25 MG: 25 TABLET, FILM COATED ORAL at 19:51

## 2021-04-13 RX ADMIN — Medication 2 TABLET: at 07:42

## 2021-04-13 RX ADMIN — Medication 1000 MCG: at 07:41

## 2021-04-13 RX ADMIN — TRAZODONE HYDROCHLORIDE 100 MG: 50 TABLET ORAL at 22:02

## 2021-04-13 RX ADMIN — SERTRALINE HYDROCHLORIDE 200 MG: 100 TABLET ORAL at 19:51

## 2021-04-13 ASSESSMENT — ACTIVITIES OF DAILY LIVING (ADL)
ADLS_ACUITY_SCORE: 15

## 2021-04-13 NOTE — PLAN OF CARE
Status: Pt here for vEEG monitoring. No events witnessed or reported this shift  Vitals: VSS on RA  Neuros: A&Ox4.  Dysconjugate gaze.    IV: PIV SL  Resp/trach: WDL  Diet: regular diet   Bowel status: LBM 4/12  : Voiding spont  Skin: EEg leads intact.    Pain: denies  Activity: up SBA + GB. Ambulated in wood x1  Plan: Continue with current POC. Glucose to be checked with each event.  Updates this shift: Sleep deprivation tonight.

## 2021-04-13 NOTE — PLAN OF CARE
"[id number]  Status: Pt here for VEEG monitoring. Had one event of feeling an aura and explained it as \"spacey\", did not progress   Vitals: VSS on RA  Neuros: A&O x4.  Dysconjugate gaze. 5/5 throughout   IV: PIV SL  Resp/trach: WDL  Diet: Regular  Bowel status: Had a BM today  : Voiding   Skin: EEG leads intact  Pain: Denied  Activity: A1 GB, sz precautions. Pt is unsteady on feet at times   Plan: Blood glucose to be checked with each event. Off lamictal. Pt to stay up till 2200 this evening. Continue with current POC.     "

## 2021-04-13 NOTE — PLAN OF CARE
Status: Pt here for VEEG monitoring. Sleep deprived this shift. No events witnessed or reported this shift  Vitals: VSS on RA  Neuros: A&O x4.  Dysconjugate gaze.    IV: PIV SL  Resp/trach: WDL  Diet: regular  Bowel status: LBM 4/12  : Voiding spont  Skin: EEG leads intact.    Pain: denies  Activity: up SBA, GB, within arms length for seizure precautions.   Plan: Blood glucose to be checked with each event. Continue with current POC.   Updates this shift: Sleep deprivation completed this shift.

## 2021-04-13 NOTE — PROGRESS NOTES
"[facility], [place]   Epilepsy Service Daily Note      Interval History:   No events yesterday or today. Is sleep deprived and slept about 3.5 hours.     Review of System:   No nausea, No vomiting, no headaches, no dizziness, no chest pain.     Medications:   Antiepileptic Medications Home Doses: lamotrigine -200  Antiepileptic Medications Current Doses: none     Exam: Blood pressure 128/64, pulse 71, temperature 96  F (35.6  C), temperature source Oral, resp. rate 16, height 1.803 m (5' 11\"), weight 110.8 kg (244 lb 3.2 oz), SpO2 98 %.   General: NAD  Neuro: Alert and oriented. Speech fluent. Hearing intact to normal conversation. Pupils equal, round, and reactive to light. Dysconjugate gaze. EOM's intact. Visual fields intact. Strong shoulder shrug. Face symmetric, tongue midline. Strength 5/5 bilaterally. No drift or pronation. Intact FNF. Sensation intact to light touch and equal bilaterally today. Facial sensation equal bilaterally.      EEG: no electrographic seizures thus far.     Assessment and Plan: Patient seen and discussed with Dr. Levi Elizabeth. Patient is a 35 year old right-handed male with a history of depression, anxiety, borderline personality disorder and PTSD who is being admitted for characterization of seizures. Several events recorded thus far without EEG correlate to indicate seizure. Glucose checked and no significant hypoglycemia (once was 75). An outside EEG from [place] reported a subclinical seizure on EEG in 1/2021.     -continue vEEG monitoring  -continue off lamotrigine   -up until 10 pm       Kitty Ching PA-C    Type of target event identified: event with no loss of awareness, staring spell with altered awareness and convulsion   Number of events: more needed  Discharge medication plan: To be decided  Further Imaging studies needed prior to discharge: No imaging required prior to discharge  Discharge transportation: not discussed  Other pertinent " issues/goals for discharge: not at this time     Total time: 20 minute was spent in the care of this patient. The patient agrees with the above mentioned plan of care. I answered all the patient's questions and addressed immediate concerns. More than 50% of time spent consisted of counseling and coordinating care, including discussion of the diagnostic significance of EEG findings, anti-seizure medication management, and planning for discharge home

## 2021-04-14 ENCOUNTER — APPOINTMENT (OUTPATIENT)
Dept: NEUROLOGY | Facility: CLINIC | Age: 36
DRG: 101 | End: 2021-04-14
Attending: PHYSICIAN ASSISTANT
Payer: COMMERCIAL

## 2021-04-14 PROCEDURE — 250N000013 HC RX MED GY IP 250 OP 250 PS 637: Performed by: PSYCHIATRY & NEUROLOGY

## 2021-04-14 PROCEDURE — 95714 VEEG EA 12-26 HR UNMNTR: CPT

## 2021-04-14 PROCEDURE — 99231 SBSQ HOSP IP/OBS SF/LOW 25: CPT | Performed by: PHYSICIAN ASSISTANT

## 2021-04-14 PROCEDURE — 250N000013 HC RX MED GY IP 250 OP 250 PS 637: Performed by: PHYSICIAN ASSISTANT

## 2021-04-14 PROCEDURE — 120N000002 HC R&B MED SURG/OB UMMC

## 2021-04-14 PROCEDURE — 95720 EEG PHY/QHP EA INCR W/VEEG: CPT | Mod: GC | Performed by: PSYCHIATRY & NEUROLOGY

## 2021-04-14 RX ADMIN — Medication 1000 MCG: at 07:53

## 2021-04-14 RX ADMIN — HYDROXYZINE HYDROCHLORIDE 25 MG: 25 TABLET, FILM COATED ORAL at 20:37

## 2021-04-14 RX ADMIN — SERTRALINE HYDROCHLORIDE 200 MG: 100 TABLET ORAL at 20:37

## 2021-04-14 RX ADMIN — Medication 2 TABLET: at 07:53

## 2021-04-14 ASSESSMENT — ACTIVITIES OF DAILY LIVING (ADL)
ADLS_ACUITY_SCORE: 15

## 2021-04-14 ASSESSMENT — VISUAL ACUITY: OU: NORMAL ACUITY

## 2021-04-14 NOTE — PROGRESS NOTES
"SPIRITUAL HEALTH SERVICES  SPIRITUAL ASSESSMENT Progress Note  [facility] 6A     Follow-up visit with pt, who talked about:     \"some sleep deprivation I'm going through to induce seizures... I don't mind doing it. They can find out more about what's going on if I have seizures while I'm here.\"     the recent death of a friend of the family (\"I wish I could go to the , but I'll still be here...\")   Supportive listening and prayer were shared. Pt said he feels he is coping well with hospital stay, remains hopeful and motivated.    PLAN: continue to follow while pt on unit.    Morgan Whitaker) Jono Camargo M.Div., [facility]  Staff   Pager [phone]      * [facility] remains available  for emergent requests/referrals, either by having the switchboard page the on-call  or by entering an ASAP/STAT consult in Epic (this will also page the on-call ). Routine Epic consults receive an initial response within 24 hours.*      "

## 2021-04-14 NOTE — PLAN OF CARE
"Status: Pt here for vEEG monitoring. Pt reported feeling \"spacey\". No events witnessed.   Vitals: VSS on RA  Neuros: A&Ox4.  Dysconjugate gaze.    IV: PIV SL  Resp/trach: WDL  Diet: regular    Bowel status: LBM 4/13  : Voiding spont  Skin: EEG leads intact.    Pain: denies  Activity: SBA + GB. Ambulated in wood x3  Plan: Continue with current POC. Glucose to be checked with each event.  Updates this shift: Sleep deprivation ended tonight at 2200  "

## 2021-04-14 NOTE — PROGRESS NOTES
"[facility], [place]   Epilepsy Service Daily Note      Interval History:   No events yesterday or today. Slept well.     Review of System:   No nausea, No vomiting, no headaches, no dizziness, no chest pain.     Medications:   Antiepileptic Medications Home Doses: lamotrigine -200  Antiepileptic Medications Current Doses: none     Exam: Blood pressure 113/76, pulse 50, temperature 96.4  F (35.8  C), temperature source Oral, resp. rate 16, height 1.803 m (5' 11\"), weight 110.8 kg (244 lb 3.2 oz), SpO2 97 %.   General: NAD  Neuro: Alert and oriented. Speech fluent. Hearing intact to normal conversation. Pupils equal, round, and reactive to light. Dysconjugate gaze. EOM's intact. Visual fields intact. Strong shoulder shrug. Face symmetric, tongue midline. Strength 5/5 bilaterally. No drift or pronation. Intact FNF. Sensation intact to light touch and equal bilaterally today. Facial sensation equal bilaterally.      EEG: no electrographic seizures thus far.     Assessment and Plan: Patient seen and discussed with Dr. Sims   1. Patient is a 35 year old right-handed male with a history of depression, anxiety, borderline personality disorder and PTSD who is being admitted for characterization of seizures. Several events recorded thus far without EEG correlate to indicate seizure. Glucose checked and no significant hypoglycemia (once was 75). An outside EEG from [place] reported a subclinical seizure on EEG in 1/2021.     -continue vEEG monitoring  -continue off lamotrigine   -sleep deprive tonight  -anticipate discharge Friday morning       Kitty Ching PA-C    Type of target event identified: event with no loss of awareness, staring spell with altered awareness and convulsion   Number of events: more needed  Discharge medication plan: To be decided  Further Imaging studies needed prior to discharge: No imaging required prior to discharge  Discharge transportation: patient to arrange "   Other pertinent issues/goals for discharge: not at this time     Total time: 15 minute was spent in the care of this patient. The patient agrees with the above mentioned plan of care. I answered all the patient's questions and addressed immediate concerns. More than 50% of time spent consisted of counseling and coordinating care, including discussion of the diagnostic significance of EEG findings, anti-seizure medication management, and planning for discharge home

## 2021-04-14 NOTE — PLAN OF CARE
Status: On VEEG monitoring, no events this shift.   Vitals: VSS on RA  Neuros: Intact except dysconjugate gaze.    IV: PIV SL  Diet: regular    Bowel status: BM this shift.  : Voiding spont  Skin: EEG leads intact.    Pain: denies  Activity: Up with SBA.  Plan: Is being sleep deprived today and tomorrow. Can sleep from 2am-6 am on 4-15 and is to stay awake until 10pm-on 4-15 (unless he has events).

## 2021-04-14 NOTE — PLAN OF CARE
VSS.  Denied pain.  Neuros intact except dysconjugate gaze.  VEEG leads in place; no events witnessed or reported.  PIV SL. On regular diet.  Voiding spontaneously.  Last BM 4/13.  Up SBA and GB.  Off PTA Lamictal.  BG to be checked with all events.  Continue with POC.

## 2021-04-15 ENCOUNTER — APPOINTMENT (OUTPATIENT)
Dept: NEUROLOGY | Facility: CLINIC | Age: 36
DRG: 101 | End: 2021-04-15
Attending: PHYSICIAN ASSISTANT
Payer: COMMERCIAL

## 2021-04-15 ENCOUNTER — TELEPHONE (OUTPATIENT)
Dept: NEUROLOGY | Facility: CLINIC | Age: 36
End: 2021-04-15

## 2021-04-15 LAB
GLUCOSE BLDC GLUCOMTR-MCNC: 174 MG/DL (ref 70–99)
GLUCOSE BLDC GLUCOMTR-MCNC: 83 MG/DL (ref 70–99)
GLUCOSE BLDC GLUCOMTR-MCNC: 87 MG/DL (ref 70–99)

## 2021-04-15 PROCEDURE — 250N000011 HC RX IP 250 OP 636: Performed by: PHYSICIAN ASSISTANT

## 2021-04-15 PROCEDURE — 120N000002 HC R&B MED SURG/OB UMMC

## 2021-04-15 PROCEDURE — 250N000013 HC RX MED GY IP 250 OP 250 PS 637: Performed by: PHYSICIAN ASSISTANT

## 2021-04-15 PROCEDURE — 999N001017 HC STATISTIC GLUCOSE BY METER IP

## 2021-04-15 PROCEDURE — 95714 VEEG EA 12-26 HR UNMNTR: CPT

## 2021-04-15 PROCEDURE — 99232 SBSQ HOSP IP/OBS MODERATE 35: CPT | Mod: 25 | Performed by: PHYSICIAN ASSISTANT

## 2021-04-15 PROCEDURE — 95720 EEG PHY/QHP EA INCR W/VEEG: CPT | Mod: GC | Performed by: PSYCHIATRY & NEUROLOGY

## 2021-04-15 PROCEDURE — 250N000013 HC RX MED GY IP 250 OP 250 PS 637: Performed by: PSYCHIATRY & NEUROLOGY

## 2021-04-15 RX ORDER — LAMOTRIGINE 100 MG/1
200 TABLET, ORALLY DISINTEGRATING ORAL EVERY EVENING
Status: DISCONTINUED | OUTPATIENT
Start: 2021-04-15 | End: 2021-04-16 | Stop reason: HOSPADM

## 2021-04-15 RX ADMIN — LORAZEPAM 2 MG: 2 INJECTION INTRAMUSCULAR; INTRAVENOUS at 21:38

## 2021-04-15 RX ADMIN — HYDROXYZINE HYDROCHLORIDE 25 MG: 25 TABLET, FILM COATED ORAL at 20:32

## 2021-04-15 RX ADMIN — LAMOTRIGINE 200 MG: 100 TABLET, ORALLY DISINTEGRATING ORAL at 23:20

## 2021-04-15 RX ADMIN — Medication 2 TABLET: at 07:39

## 2021-04-15 RX ADMIN — ERGOCALCIFEROL 50000 UNITS: 1.25 CAPSULE, LIQUID FILLED ORAL at 07:43

## 2021-04-15 RX ADMIN — LAMOTRIGINE 250 MG: 100 TABLET, ORALLY DISINTEGRATING ORAL at 15:39

## 2021-04-15 RX ADMIN — SERTRALINE HYDROCHLORIDE 200 MG: 100 TABLET ORAL at 20:32

## 2021-04-15 RX ADMIN — Medication 1000 MCG: at 07:39

## 2021-04-15 ASSESSMENT — ACTIVITIES OF DAILY LIVING (ADL)
ADLS_ACUITY_SCORE: 15

## 2021-04-15 NOTE — PLAN OF CARE
Status: vEEG monitoring, no events this shift  Vitals: VSS on RA  Neuros: AOx4. 5/5 all extremities. Dysconjugate gaze.   IV: PIV SL  Resp/trach: Lung sounds clear  Diet: Reg diet  Bowel status: BM this shift  : Voiding spont  Skin: EEG leads intact  Pain: denies  Activity: up SBA  Plan: Sleep deprive until 2 am, sleep until 6 am, then awake until 10pm

## 2021-04-15 NOTE — PLAN OF CARE
VSS. Denies pain. Neuros unchanged; dysconjugate gaze. VEEG leads in place, no events this shift. Pt will be started back on meds this afternoon, plan to discharge home tomorrow. Pt is able to make needs known.

## 2021-04-15 NOTE — TELEPHONE ENCOUNTER
Received a Attending Physician's Statement-Progress Report form to be completed.  Form saved to the [employer].  Encounter routed.

## 2021-04-15 NOTE — PLAN OF CARE
[id number]:    Assumed care of pt for several hours. Event button pressed around 1500, see notes. Prior to event, neuros unchanged, dysconjugate gaze VSS, up SBA/GB, ate 100% of lunch.

## 2021-04-15 NOTE — PROGRESS NOTES
"Time/length of seizure event: Event button pushed by patient at 0521.  Lasted < 5 minutes  Movements (head, eyes, extremities):  No movements.  Pt stated he 'felt off\".  Orientation during seizure:O x 4  After seizure, remembers the unique phrase given during seizure:  no  After seizure, remembers an unnamed visual object shown during seizure:  yes  Able to identify/name aloud item during seizure:  yes  Able to follow command during seizure:  Yes  Able to read test sentence aloud during seizure:  Yes  Able to read test sentence aloud again after the seizure:  Yes  After seizure, remembers name of object shown during seizure:  Yes  Orientation and level of consciousness after seizure:   AXO x 4  VS and oxygen saturation during/after seizure:  VSS  Recall of the event?:Yes  Was this a typical seizure/event?:  Yes  Presence of aura or pre-seizure activity:  No   Incontinence:  No      BG take and was 83.  Will continue to monitor.      "

## 2021-04-15 NOTE — PROVIDER NOTIFICATION
"Time/length of seizure event: Approximately 7 minutes, starting at 1500 when pt pressed his button  Movements (head, eyes, extremities): Still, eyes closed  Orientation during seizure: Unresponsive   After seizure, remembers the unique phrase given during seizure: No  After seizure, remembers an unnamed visual object shown during seizure: No  Able to identify/name aloud item during seizure: No  Able to follow command during seizure: No  Able to read test sentence aloud during seizure: No  Able to read test sentence aloud again after the seizure: Yes  After seizure, remembers name of object shown during seizure: No  Orientation and level of consciousness after seizure: AO4  VS and oxygen saturation during/after seizure: Stable, O2 95%, HR 65 during event  Recall of the event?: Yes   Was this a typical seizure/event?: Yes   Presence of aura or pre-seizure activity: No  Incontinence: No    BS 87 during event. Pt reports feeling \"really sleepy\" during event. Provider notified   "
Patient has nausea.  Patient is asking for zofran. MD notified.   
crying/flailing/squirming/agitated

## 2021-04-15 NOTE — PLAN OF CARE
VSS.  Denied pain.  Neuros intact except dysconjugate gaze.  VEEG leads in place; no events witnessed or reported.  Pt sleep deprived overnight with nap from [id number]; now to be up until 2200.  L PIV SL. On regular diet.  Voiding spontaneously.  Last BM 4/14.  Up SBA and GB.  Off PTA Lamictal.  BG to be checked with all events.  Likely discharge home Friday AM. Continue with POC.      Addendum:  Pt pushed event button at 0521.  See next note for ROAR.

## 2021-04-15 NOTE — PROGRESS NOTES
"[facility], [place]   Epilepsy Service Daily Note      Interval History:   Is sleep deprived, slept about 3.5 hours. Pushed event button at 0521 as he was \"feeling aura-lori\". He had no maria del carmen vu sensation but felt slightly disoriented. Glucose was normal.     Review of System:   No nausea, No vomiting, no headaches, no dizziness, no chest pain.     Medications:   Antiepileptic Medications Home Doses: lamotrigine -200  Antiepileptic Medications Current Doses: none     Exam: Blood pressure (!) 155/89, pulse 60, temperature 96.3  F (35.7  C), temperature source Oral, resp. rate 16, height 1.803 m (5' 11\"), weight 110.8 kg (244 lb 3.2 oz), SpO2 95 %.   General: NAD  Neuro: Alert and oriented. Speech fluent. Hearing intact to normal conversation. Pupils equal, round, and reactive to light. Dysconjugate gaze. EOM's intact. Visual fields intact. Strong shoulder shrug. Face symmetric, tongue midline. Strength 5/5 bilaterally. No drift or pronation. Intact FNF. Sensation intact to light touch and equal bilaterally today. Facial sensation equal bilaterally.      EEG: no electrographic seizures thus far.     Assessment and Plan: Patient seen and discussed with Dr. Levi Elizabeth. Patient is a 35 year old right-handed male with a history of depression, anxiety, borderline personality disorder and PTSD who is being admitted for characterization of seizures. Several events recorded thus far without EEG correlate to indicate seizure. Glucose checked and no significant hypoglycemia (once was 75). An outside EEG from [place] did report a subclinical seizure on EEG in 1/2021. We did review events recorded thus far are consistent with psychogenic nonepileptic spells. Discussed this is a physical manifestation of underlying trauma and past or present stressors. We reviewed best treatment is psychotherapy and continued management of mental health.     -continue vEEG monitoring  -restart PTA lamotrigine ODT " 250-200 (today at 1400 and 2200)  -return to work form completed and faxed to patients work per his request   -discharge tomorrow morning      Kitty Ching PA-C    Type of target event identified: event with no loss of awareness, staring spell with altered awareness and convulsion   Number of events: more needed  Discharge medication plan: PTA lamotrigine ODT   Further Imaging studies needed prior to discharge: No imaging required prior to discharge  Discharge transportation: patient to arrange   Other pertinent issues/goals for discharge: not at this time     Total time: 25 minute was spent in the care of this patient. The patient agrees with the above mentioned plan of care. I answered all the patient's questions and addressed immediate concerns. More than 50% of time spent consisted of counseling and coordinating care, including discussion of the diagnostic significance of EEG findings, anti-seizure medication management, and planning for discharge home

## 2021-04-16 ENCOUNTER — APPOINTMENT (OUTPATIENT)
Dept: NEUROLOGY | Facility: CLINIC | Age: 36
DRG: 101 | End: 2021-04-16
Attending: PHYSICIAN ASSISTANT
Payer: COMMERCIAL

## 2021-04-16 VITALS
OXYGEN SATURATION: 98 % | BODY MASS INDEX: 34.19 KG/M2 | HEIGHT: 71 IN | HEART RATE: 73 BPM | DIASTOLIC BLOOD PRESSURE: 66 MMHG | TEMPERATURE: 96.5 F | WEIGHT: 244.2 LBS | SYSTOLIC BLOOD PRESSURE: 120 MMHG | RESPIRATION RATE: 16 BRPM

## 2021-04-16 PROCEDURE — 250N000013 HC RX MED GY IP 250 OP 250 PS 637: Performed by: PSYCHIATRY & NEUROLOGY

## 2021-04-16 PROCEDURE — 95718 EEG PHYS/QHP 2-12 HR W/VEEG: CPT | Mod: GC | Performed by: PSYCHIATRY & NEUROLOGY

## 2021-04-16 PROCEDURE — 99238 HOSP IP/OBS DSCHRG MGMT 30/<: CPT | Mod: 25 | Performed by: PHYSICIAN ASSISTANT

## 2021-04-16 PROCEDURE — 95711 VEEG 2-12 HR UNMONITORED: CPT

## 2021-04-16 PROCEDURE — 250N000013 HC RX MED GY IP 250 OP 250 PS 637: Performed by: PHYSICIAN ASSISTANT

## 2021-04-16 PROCEDURE — 250N000009 HC RX 250: Performed by: PHYSICIAN ASSISTANT

## 2021-04-16 RX ORDER — LAMOTRIGINE 200 MG/1
200 TABLET, ORALLY DISINTEGRATING ORAL 2 TIMES DAILY
COMMUNITY
Start: 2021-04-16 | End: 2021-04-22

## 2021-04-16 RX ORDER — LAMOTRIGINE 50 MG/1
50 TABLET, ORALLY DISINTEGRATING ORAL EVERY MORNING
COMMUNITY
Start: 2021-04-16 | End: 2021-04-22

## 2021-04-16 RX ORDER — LAMOTRIGINE 50 MG/1
250 TABLET, ORALLY DISINTEGRATING ORAL DAILY
Status: DISCONTINUED | OUTPATIENT
Start: 2021-04-16 | End: 2021-04-16 | Stop reason: HOSPADM

## 2021-04-16 RX ADMIN — LAMOTRIGINE 250 MG: 50 TABLET, ORALLY DISINTEGRATING ORAL at 09:49

## 2021-04-16 RX ADMIN — Medication 1000 MCG: at 07:34

## 2021-04-16 RX ADMIN — Medication 2 TABLET: at 07:34

## 2021-04-16 RX ADMIN — BACITRACIN ZINC: 500 OINTMENT TOPICAL at 10:41

## 2021-04-16 ASSESSMENT — ACTIVITIES OF DAILY LIVING (ADL)
ADLS_ACUITY_SCORE: 15

## 2021-04-16 ASSESSMENT — VISUAL ACUITY: OU: BASELINE;GLASSES

## 2021-04-16 NOTE — PLAN OF CARE
Time/length of seizure event:4-5 mins 2146  Movements (head, eyes, extremities):laying on the floor; no movement  Orientation during seizure:Not able to respond  After seizure, remembers the unique phrase given during seizure:NA  After seizure, remembers an unnamed visual object shown during seizure:NA  Able to identify/name aloud item during seizure:NA  Able to follow command during seizure:No; unresponsive  Able to read test sentence aloud during seizure:No  Able to read test sentence aloud again after the seizure:No;stated he did not comprehend  After seizure, remembers name of object shown during seizure:NA  Orientation and level of consciousness after seizure:Oriented to self and place  VS and oxygen saturation during/after seizure:Charted  Recall of the event?:Remembers waking up on the floor  Was this a typical seizure/event?:yes  Presence of aura or pre-seizure activity:No  Incontinence:No

## 2021-04-16 NOTE — PROGRESS NOTES
"  I was notified by nursing staff that Mr. Brown was found on the floor beside his bed and since he is here for seizure characterization, with as needed Ativan available, I was in agreement with administering 2 mg of Ativan to treat a presumed generalized tonic clonic seizure.  I was in a stroke code at the time, so was not available to arrive bedside until sometime later.  By this time the EEG staff and I had reviewed the EEG which shows rhythmic shaking in the right hand, then the patient sliding off of the bed onto his bottom, and carefully lowering his head to the floor.  The EEG shows no corresponding left hemispheric rhythmicity, there is no evolution of the frequency in either hemisphere, and after the cessation of movement the background rhythm is identical to the prodromal phase.      Physical Examination   Vitals: BP (!) 151/82 (BP Location: Left arm)   Pulse 72   Temp 96.8  F (36  C) (Oral)   Resp 18   Ht 1.803 m (5' 11\")   Wt 110.8 kg (244 lb 3.2 oz)   SpO2 96%   BMI 34.06 kg/m    General: Adult patient, lying in bed, NAD  HEENT: Head is atraumatic, without abrasions or swelling. There is tenderness over the occipital protuberance, midline.   Cardiac: RRR  Chest: non-labored on RA  Abdomen: S/NT/ND  Extremities: Warm, no edema  Skin: No rash or lesion   Neuro:  Mental status: Awake, alert, attentive, oriented to self, time, place, and circumstance. Mildly fatigued following ativan, keeps eyes closed unless asked to open them. Language is fluent and coherent with intact comprehension of complex commands, naming and repetition.  Cranial nerves: Eyes are disconjugate. Extraocular movements are still full throughout.   VFF, PERRL, conjugate gaze, facial sensation intact, face symmetric, shoulder shrug strong, tongue/uvula midline, no dysarthria.   Motor: Normal bulk and tone. No abnormal movements. There is full strength in all extremities proximally and distally, however, there are occasional " periods of impersistent activation of the Left arm and left leg  Reflexes: 2+ reflexic and symmetric biceps, brachioradialis,  patellae, and achilles. Negative Everett, no clonus, toes down-going.  Sensory: Intact to light touch throughout   Coordination: Pt touches his cheek instead of the nose equally and bilaterally with F-N-F  Gait: Not assessed- fatigued following ativan     Assessment/ Plan:  - This is most consistent with a nonepileptic event.    - Given the video showing that there was no head trauma, I did not order an emergent head CT.  I did evaluate the patient bedside and performed a thorough neurologic exam described above.   - Pt was offered acetaminophen for the scalp tenderness, though he refused treatment with any medication  - Pt continued to have non-focal exams following this event, and no worsening mental status     Kentrell Varghese MD  Neurology Resident PGY4

## 2021-04-16 NOTE — PROGRESS NOTES
"Javi Brown is a 35 year old right-handed male who underwent video EEG monitoring 4/8-16/2021.  His lamotrigine was tapered to off. His EEG showed mild diffuse encephalopathy and cortical dysfunction over the left temporal region.  He had 5 clinical events during the 9 days of video EEG monitoring.  Three of his events involved unresponsiveness.  One event involved bilateral feet movements, right greater than left.  One event involved rhythmic right hand movements of high amplitude with later falling off the bed and development of torso and bilateral leg movements.  None of these events was associated with an abnormal activity on the EEG.  No epileptiform discharges or electrographic seizures were captured during 9 days of video EEG monitoring.      The patient was restarted on lamotrigine the day before discharge, since it was primarily prescribed for his mood.  He was debriefed on diagnosis, and treatment/management of these spells.  It was  explained for him that these events are consistent with psychogenic nonepileptic spells.  Often these events arise from remote emotional conflicts, or childhood traumas. The patient affirms occurrence of such events.  I explained that the best treatment for these events is psychotherapy, especially cognitive behavioral therapy (CBT).  The patient has a psychiatrist and is going to establish care with new psychologist as well.  He was provided information about the book \"taking control of your seizures\" by Ana and was encouraged to discuss this with his psychologist.  The patient voiced understanding.     The patient is going to have a follow up video visit with Dr. Snyder in 4 weeks.    Miladys Cuellar MD        "

## 2021-04-16 NOTE — PLAN OF CARE
Status: Pt here for seizure characterization. 1 event this shift (see note)  RN noted that pt was not in bed, upon entering the room, pt was found on the floor and unresponsive. Sternal rub performed w/no response from the patient. Pt lifted back to bed and MD was paged and came to assess pt. EEG tech was called and tape was reviewed, video showed pt lowering himself to the floor and positioning himself on the floor.   Vitals: VSS on RA  Neuros: AOx4. 5/5 all extremities. Dysconjugate gaze at baseline.   IV: PIV SL  Resp/trach: LS clear  Diet: Tolerating a reg diet w/good intake  Bowel status: No BM this shift  : Voiding spont  Skin: EEG leads intact  Pain: No c/o pain  Activity: up 1A and GB; within arms reach. Ambulated in the halls twice this shift.   Plan: Continue to monitor and follow POC.

## 2021-04-16 NOTE — PLAN OF CARE
VSS.  Reported posterior head soreness/tenderness near 1 of the EEG leads; declined interventions.  Neuros intact except dysconjugate gaze.  VEEG leads in place; no events witnessed or reported.  L PIV SL. On regular diet.  Voiding spontaneously.  Last BM 4/15.  Up 1 and GB; unsteady gait.  PTA Lamictal restarted yesterday.   BG to be checked with all events.  Likely discharge home by 1100 today. Continue with POC.

## 2021-04-16 NOTE — DISCHARGE SUMMARY
[facility]  EPILEPSY SERVICE DISCHARGE SUMMARY    Patient Name:  Javi Brown  MRN:  [id number]      :  1985      Date of Admission:  2021  Date of Discharge::  2021 10:50 AM  Admitting Physician:  Lesly Snyder MD  Discharge Physician:  CASSANDRA Rodriguez, Dr. Sims   Primary Care Provider:   System, Provider Not In  Discharge Disposition:   Discharged to home    Admission Diagnoses:  1. Seizures (subclinical seizure recorded on outside EEG)  2. Possible nonepileptic spells per past notes   3. Depression  4. Anxiety  5. Borderline personality disorder  6. PTSD  7. History of ADHD  8. History of gastric bypass 2016   9. Migraines     Discharge Diagnoses:    1. Seizures (subclinical seizure recorded on outside EEG)  2. Psychogenic nonepileptic spells/attacks (recorded during this admission)  3. Depression  4. Anxiety  5. Borderline personality disorder  6. PTSD  7. History of ADHD  8. History of gastric bypass 2016   9. Migraines     Brief History of Illness:   Patient is a 35 year old right-handed male with a history of depression, anxiety, borderline personality disorder and PTSD who is being admitted for characterization of seizures.  First seizure was 2021. He recalls feeling dizzy and nauseous and next thing he remembers is waking up with people standing over him. Witnesses reported he was shaking. No tongue bite or incontinence. At the time he was taking lamotrigine for his mood. This was started in 2020. Despite this and increases in lamotrigine, his seizure-like spells have continued. An EEG at [place] in 2021 showed epileptogenic discharge over the central region, consistent with a partial seizure disorder. One subclinical seizure arising from the central region was recorded. There has also been some concern for nonepileptic spells.  He describes the following seizures:  Type 1: This consists of maria del carmen vu, dreamy feeling,  "tingling on face and feeling of anxiety. He does not lose awareness. These last about 2 minutes. Has had about 4 total.  Type 2: These start with aura above and progress to loss of awareness and unresponsiveness. His fiance reports his jaw moves and his hands and arms shake. Wife feels his eyes are usually closed. This last 1-2 minutes. After he is confused and tired. This is happening about once a week.  Type 3: No warning. Wife notes whole body stiffening and shaking. Wife feels eyes are closed but patient has been told eyes are open. No tongue bite. No incontinence. Has had total of 3-4.     Hospital course:  Multiple events recorded without EEG correlate to suggest electrographic seizures. Events consistent with psychogenic nonepileptic spells. It was discussed with patient this is an unusual physical manifestation of underlying trauma and past or present stressors. Best treatment is to follow-up with a psychologist for psychotherapy and continued management of mental health with psychiatrist. He was provided with [employer] workbook reference and was encouraged to discuss using this with his psychologist. He was accepting of diagnosis.     We did review that he has history of a subclinical seizure recorded on EEG at an outside hospital and thus should stay on an anti-seizure medication. Prior to discharge his PTA lamotrigine -200 (which was originally prescribed for mood) was restarted.     He was provided a return to work (fitness for duty form) with following restrictions: avoid heights, ladders, heavy machinery, power tools, driving and other activities in which loss of awareness/consciousness could cause serious harm. A separate letter was drafted with above as well as to avoid working around open flames or hot stove tops and exposure to vessels with hot cooking oil or water as he has a second job as a .     Attending Physician (Dr. Sims) Summary and Plan:  \"Javi Brown is a 35 year old " "right-handed male who underwent video EEG monitoring 4/8-16/2021.  His lamotrigine was tapered to off. His EEG showed mild diffuse encephalopathy and cortical dysfunction over the left temporal region.  He had 5 clinical events during the 9 days of video EEG monitoring.  Three of his events involved unresponsiveness.  One event involved bilateral feet movements, right greater than left.  One event involved rhythmic right hand movements of high amplitude with later falling off the bed and development of torso and bilateral leg movements.  None of these events was associated with an abnormal activity on the EEG.  No epileptiform discharges or electrographic seizures were captured during 9 days of video EEG monitoring.       The patient was restarted on lamotrigine the day before discharge, since it was primarily prescribed for his mood.  He was debriefed on diagnosis, and treatment/management of these spells.  It was  explained for him that these events are consistent with psychogenic nonepileptic spells.  Often these events arise from remote emotional conflicts, or childhood traumas. The patient affirms occurrence of such events.  I explained that the best treatment for these events is psychotherapy, especially cognitive behavioral therapy (CBT).  The patient has a psychiatrist and is going to establish care with new psychologist as well.  He was provided information about the book \"taking control of your seizures\" by Ana and was encouraged to discuss this with his psychologist.  The patient voiced understanding.      The patient is going to have a follow up video visit with Dr. Snyder in 4 weeks.\"    Consultations:    None     Discharge Medications:    Current Discharge Medication List      CONTINUE these medications which have CHANGED    Details   !! lamoTRIgine (LAMICTAL ODT) 200 MG TBDP ODT tab Take 1 tablet (200 mg) by mouth 2 times daily    Associated Diagnoses: Seizure (H)      !! lamoTRIgine (LAMICTAL " ODT) 50 MG TBDP ODT tab Take 1 tablet (50 mg) by mouth every morning    Associated Diagnoses: Seizure (H)       !! - Potential duplicate medications found. Please discuss with provider.      CONTINUE these medications which have NOT CHANGED    Details   acetaminophen 500 MG CAPS Take 500 mg by mouth as needed       cyanocobalamin (VITAMIN B-12) 1000 MCG SUBL sublingual tablet Place 1,000 mcg under the tongue daily      ergocalciferol (ERGOCALCIFEROL) 1.25 MG (71883 UT) capsule Take 50,000 Units by mouth every 7 days thursdays      hydrOXYzine (VISTARIL) 25 MG capsule Take 25 mg by mouth every evening       Pediatric Multi Vit-Extra C-FA (FLINTSTONES/EXTRA C) CHEW Take 1 tablet by mouth every morning       sertraline (ZOLOFT) 100 MG tablet Take 200 mg by mouth every evening       traZODone (DESYREL) 50 MG tablet Take  mg by mouth At Bedtime       diazepam (DIAZEPAM INTENSOL) 5 MG/ML (HIGH CONC) solution Diazepam: Give 1 ml (5mg): Give 1 ml for seizures greater 5 minutes or more than 2 seizures within an 8 hour period. May repeat once 1 ml (5mg). Monitor breathing, if there any concerns call 911. Do not exceed 10 mg per day.  Qty: 30 mL, Refills: 0    Associated Diagnoses: Seizure (H)      ONDANSETRON PO       SUMAtriptan (IMITREX) 6 MG/0.5ML injection Inject 6 mg Subcutaneous at onset of headache for migraine              Discharge physical examination:   Vitals:  B/P: 120/66, T: 96.5, P: 73, R: 16  General:  Adult, in NAD, cooperative  HEENT:  NC/AT, full ROM neck  Cardiac:  RRR, no m/r/g  Chest:  CTAB  Abdomen:  S/NT/ND  Extremities:  No LE swelling or tenderness   Skin:  No rash, lesion or abrasions  Psych:  Mood pleasant, affect congruent  Neuro: Alert and oriented. Speech fluent. Hearing intact to normal conversation. Pupils equal, round, and reactive to light. Dysconjugate gaze. EOM's intact. Visual fields intact. Strong shoulder shrug. Face symmetric, tongue midline. Strength 5/5 bilaterally. No drift or  pronation. Intact FNF. Sensation intact to light touch and equal bilaterally. Gait normal with mild unsteadiness, but safe.     Discharge follow up and instructions:    1.  Diet:   As prior to admission   2.  Activity: State laws prohibit operating a motor vehicle following any seizure or other episode with loss of awareness, or inability to sit up (Varies by state, be aware and comply with the regulations applicable to you). State law may require the licensed  to report any future episode to the DMV . Avoid any activities that might lead to self-injury or injury of others following any event with impaired awareness or impaired motor control. Such activities include but are not limited to holding babies or young children at heights from which they might be injured if dropped, bathing infants or young children in situations in which they might drown without continuous interactive care by an adult who is fully capable at all times during the bath, operating power cutting or other tools, handling firearms, exposure to heights from which you might fall, exposure to vessels with hot cooking oil or water, and swimming alone.  3.  Follow up:  [facility] nurse call in 2-3 days. Follow-up video visit with Dr. Snyder in 4 weeks. Continue following with psychiatrist and establish care with a psychologist.       CASSANDRA Rodriguez    Total time: 25 minutes was spent in the care of this patient. The patient agrees with the above mentioned plan of care. I answered all the patient's questions and addressed immediate concerns. More than 50% of time spent consisted of counseling and coordinating care, including discussion of the diagnostic significance of EEG findings, anti-seizure medication management, and planning for discharge home

## 2021-04-18 ENCOUNTER — PATIENT OUTREACH (OUTPATIENT)
Dept: CARE COORDINATION | Facility: CLINIC | Age: 36
End: 2021-04-18

## 2021-04-18 NOTE — PROGRESS NOTES
Date: 4/19/2021 Status: Naila   Time: 10:00 AM Length: 30   Visit Type: TELEPHONE VISIT RETURN [2311] ABBY:     Provider: Meghan Elizabeth Nurse Department: [facility] EPILEPS[facility]     Patient was contacted by an RN for post DC follow up so no duplicate post DC follow up call will be made

## 2021-04-19 ENCOUNTER — VIRTUAL VISIT (OUTPATIENT)
Dept: NEUROLOGY | Facility: CLINIC | Age: 36
End: 2021-04-19
Payer: COMMERCIAL

## 2021-04-19 DIAGNOSIS — R56.9 SEIZURES (H): Primary | ICD-10-CM

## 2021-04-19 NOTE — PROGRESS NOTES
"          \"EPILEPSY SERVICE DISCHARGE SUMMARY     Patient Name:  Javi Brown  MRN:  [id number]      :  1985        Date of Admission:               2021  Date of Discharge::              2021 10:50 AM  Admitting Physician:             Lesly Snyder MD  Discharge Physician:             CASSANDRA Rodriguez, Dr. Sims   Primary Care Provider:         System, Provider Not In  Discharge Disposition:         Discharged to home     ...    Discharge Diagnoses:    1. Seizures (subclinical seizure recorded on outside EEG)  2. Psychogenic nonepileptic spells/attacks (recorded during this admission)  3. Depression  4. Anxiety  5. Borderline personality disorder  6. PTSD  7. History of ADHD  8. History of gastric bypass 2016   9. Migraines   ...  Multiple events recorded without EEG correlate to suggest electrographic seizures. Events consistent with psychogenic nonepileptic spells. It was discussed with patient this is an unusual physical manifestation of underlying trauma and past or present stressors. Best treatment is to follow-up with a psychologist for psychotherapy and continued management of mental health with psychiatrist. He was provided with [employer] workbook reference and was encouraged to discuss using this with his psychologist. He was accepting of diagnosis.      We did review that he has history of a subclinical seizure recorded on EEG at an outside hospital and thus should stay on an anti-seizure medication. Prior to discharge his PTA lamotrigine -200 (which was originally prescribed for mood) was restarted.      He was provided a return to work (fitness for duty form) with following restrictions: avoid heights, ladders, heavy machinery, power tools, driving and other activities in which loss of awareness/consciousness could cause serious harm. A separate letter was drafted with above as well as to avoid working around open flames or hot stove tops and exposure to " "vessels with hot cooking oil or water as he has a second job as a .\"      Future Appointments   Date Time Provider Department Center   5/19/2021 10:15 AM [facility]       Patient reports he is doing \"so-so\" since the admission.  Maria L feels he might have had an epileptic seizure over the weekend.  It was short, he was noted to be checked out. There was no aura    Lamotrigine confirmed as ODT, 250 mg AM and 200 mg PM    Patient understands he likely have both epileptic and nonepileptic events, and the treatments for the two types differ. He appears accepting of the diagnosis  Patient is wondering if  needs to know when each event happens.  We discussed that keeping a diary will be important - patient notes he will be using the [employer] for his phone, and that he can share the information with  during the scheduled visits    We discussed scheduled follow up, and that  will be able to discuss future treatment options at the next visit.  Patient has returned to work today, with adjusted work hours so that he is not in as early.  He has explained to his coworkers to make sure he is safe, and help get if needed.    Patient is reporting some tiredness he feels may be related to the medication formulary changes, and notes in addition he did not get a full night of sleep.    No other questions or concerns at this time    Patient was instructed to contact us if questions or concerns arise.      "

## 2021-04-21 ENCOUNTER — MYC MEDICAL ADVICE (OUTPATIENT)
Dept: NEUROLOGY | Facility: CLINIC | Age: 36
End: 2021-04-21

## 2021-04-21 NOTE — TELEPHONE ENCOUNTER
Patient was contacted about this APS from [facility] because they did not supply an ISRAEL. He gave verbal permission to provide medical information to them. Form was prepared and printed for the provider.

## 2021-04-22 ENCOUNTER — VIRTUAL VISIT (OUTPATIENT)
Dept: NEUROLOGY | Facility: CLINIC | Age: 36
End: 2021-04-22
Payer: COMMERCIAL

## 2021-04-22 DIAGNOSIS — R56.9 SEIZURE (H): ICD-10-CM

## 2021-04-22 RX ORDER — LAMOTRIGINE 200 MG/1
TABLET, ORALLY DISINTEGRATING ORAL
Qty: 225 TABLET | Refills: 3 | Status: SHIPPED | OUTPATIENT
Start: 2021-04-22 | End: 2021-08-10
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
## 2021-04-22 NOTE — LETTER
Date:April 23, 2021      Patient was self referred, no letter generated. Do not send.        [facility] Health Information

## 2021-04-22 NOTE — PATIENT INSTRUCTIONS
Increase lamotrigine 200 mg morning, 200 mg noon and 100 mg evening.     Diazepam: Give 1 ml (5mg): Give 1 ml for seizures greater 5 minutes or more than 2 seizures within an 8 hour period. May repeat once 1 ml (5mg). Monitor breathing, if there any concerns call 911. Do not exceed 10 mg per day    Lesly Snyder MD
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
## 2021-04-25 ENCOUNTER — HEALTH MAINTENANCE LETTER (OUTPATIENT)
Age: 36
End: 2021-04-25

## 2021-04-28 ENCOUNTER — TELEPHONE (OUTPATIENT)
Dept: NEUROLOGY | Facility: CLINIC | Age: 36
End: 2021-04-28

## 2021-04-28 NOTE — LETTER
4/28/2021       RE: Javi Brown  [address] [place] [address]        Javi,       For your work, you should note the following restrictions:-      No working on ladders, or at heights from which you could fall    Do not operate cutting, power, or other tools. Do not handle firearms or explosive materials.    You should not be exposured to vessels with hot cooking oil or water.    You should not work around equipment that has steam or scalding water (eg.some commercial dishwashing equipment)    You should not perform an activity in which loss of awareness would place you or others in danger or at risk of harm or injury (including unassisted supervision of minors or vulnerable adults)      These restrictions can be reviewed and adjusted periodically as appropriate.      Please call us if you have specific questions or recommendations    Thank you          Ziyad CORONA, RN  Nurse Care Coordinator  [facility]

## 2021-04-28 NOTE — TELEPHONE ENCOUNTER
What is the concern that needs to be addressed by a nurse? Pt called asking about work restrictions. Dr. 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 Please call back.     May a detailed message be left on voicemail? yes    Date of last office visit: 4/22/21    Message routed to: mincep rn pool

## 2021-04-28 NOTE — TELEPHONE ENCOUNTER
Call placed to patient.    He should not operate cutting, power, or other tools.  He should not have exposure to vessels with hot cooking oil or water.  He should not work around equipment that has steam or scalding water (eg.some dishwashing equipment)  He should not perform an activity in which loss of awareness would place himself, or others in danger or at risk of harm or injury.    These restrictions can be reviewed and adjusted periodically as appropriate.    Will placed these in a letter for review through the patient portal

## 2021-05-19 ENCOUNTER — VIRTUAL VISIT (OUTPATIENT)
Dept: NEUROLOGY | Facility: CLINIC | Age: 36
End: 2021-05-19
Payer: COMMERCIAL

## 2021-05-19 DIAGNOSIS — R56.9 SEIZURES (H): Primary | ICD-10-CM

## 2021-05-19 RX ORDER — BUSPIRONE HYDROCHLORIDE 30 MG/1
30 TABLET ORAL 2 TIMES DAILY
Status: ON HOLD | COMMUNITY
Start: 2021-04-26 | End: 2023-09-17
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
## 2021-05-19 NOTE — LETTER
Date:May 20, 2021      Patient was self referred, no letter generated. Do not send.        [facility] Information
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
## 2021-05-19 NOTE — LETTER
2021       RE: Javi Brown  : 1985   MRN: [id number]      Mr. Brown may return back to 6:00 am to 2:30 pm, five days per week work schedule.    I have advised him to maintain proper seizure precautions, he is prohibited from operating a motor vehicle, avoid any activities that might lead to self-injury or injury of others,  such activities include but are not limited to operating powered tools, climbing ladders/trees/exposure to heights from which he might fall, he should not operate power tools or heavy machinery and equipment.  Kindly, accomodate these work restriction. We can re-evaluate these restrictions end of 2021.     Sincerely,       Lesly Snyder MD

## 2021-05-19 NOTE — PATIENT INSTRUCTIONS
"Non epileptic spell: staring with unresponsiveness. Spells last over 5 minutes, may have lower extremities or upper extremities shaking (low amplitude shaking).     Possible focal seizure with no impairment in awareness: \"maria del carmen vu, dreamy, tingling on on face, weird feeling in chest of anxiety, I can feel like something is going on and I can not control it\". Last spell was end of mid April 2021. These usually are a few minutes.     Possible focal seizure with impairment in awareness : aura maria del carmen vu, dreamy feeling, tingling, progresses to loss of awareness, looks to ceiling with eye deviation, lip smacking. Last spell was 4/8/2021. These usually are a few minutes.     Possible generalized tonic-clonic convulsion: jaw gets tight, does not move, feet shake, arms clench up and flexes upper extremities , has loss of awareness, increased salivation, + tongue bite, no urinary incontinence. Total of 4 generalized tonic-clonic convulsion. Last generalized tonic-clonic convulsion was possibly 4/20/2021.         Continue lamotrigine  mg morning, 200 mg noon and 100 mg evening     Diazepam: Give 1 ml (5mg): Give 1 ml for seizures greater 5 minutes or more than 2 seizures within an 8 hour period. May repeat once 1 ml (5mg). Monitor breathing, if there any concerns call 911. Do not exceed 10 mg per day.     Follow up 3 months    Review non epileptic spell video in clinic with Javi     Talk to primary care provider about increase in buspar    Lesly Snyder MD   "

## 2021-06-21 ENCOUNTER — TELEPHONE (OUTPATIENT)
Dept: NEUROLOGY | Facility: CLINIC | Age: 36
End: 2021-06-21

## 2021-06-21 DIAGNOSIS — G43.909 MIGRAINE: Primary | ICD-10-CM

## 2021-06-21 DIAGNOSIS — R56.9 SEIZURES (H): ICD-10-CM

## 2021-06-21 NOTE — TELEPHONE ENCOUNTER
Discussed with   At this time it is important for the patient to have medial evaluation to make sure there is not a concern of him having had a bleed following his recent bump to his head  He should be evaluated at his local emergency providers facility   If there is no indication that he has had intercranial bleeding, they could also recommend/provide medications for acute treatment of his headache.    Call returned to patient and recommendations discussed.  Patient expressed understanding of the recommendations.  No other questions at this time

## 2021-06-21 NOTE — TELEPHONE ENCOUNTER
Call transfer from     Patient reports he had seizure like activity on Saturday.  He has minimal recall of the event, but reports he was drooling a lot and fell.  He hit his head on something and reports there was blood    Patient was evaluated at [facility] in [facility].  Did a CT scan and didn't find any abnormalities  Did not need stitches on the laceration to his head  No other injury    No missed or late medications  No medical changes  No fever, no GI changes  No COVID exposure, has been vaccinated.      Today patient reports he has a severe migraine on the left side of his head, and has had nausea all day.  He did go to work, but the migraine worsened through the day  Patient has imitrex injection for his migraine (ordered in the past by a different provider/clinic), and he did take a dose about 45 mins ago, but it did not have a lasting effect.      Message sent to MD for recommendations

## 2021-06-21 NOTE — TELEPHONE ENCOUNTER
What is the concern that needs to be addressed by a nurse? Patient called to report/discuss seizure he had on Sat. Fell and hit his head pretty bad and it bleed. He is experiencing a really bad head ache today and wanted to speak to nurse. Call ransferred to nurse    May a detailed message be left on voicemail?   Date of last office visit:     Message routed to: Brent COTTON ppool

## 2021-06-22 NOTE — TELEPHONE ENCOUNTER
Patient called in and provided an update.    He went to the local ED as recommended.    Patient reports the facility treated his migraine with tramadol which helped the headache, and they performed an examination.No imaging was performed.    Initially headache went away, but has now come back.  Patient has used another imitrex injection, and it has not helped    He is wondering if there are additional recommendations    Message out to , will call patient back with recommendations

## 2021-06-22 NOTE — TELEPHONE ENCOUNTER
Discussed wih   She recommends continuing the Imitrex as prescribed (other providers)  Medication for nausea (phenergan 25-50mg prn)  Benadryl sam be recommended, however this is listed as an allergy/intolerance on patient record.  OTC NSAID such as ibuprofen  Good fluid intake  Encourage sleep/rest  Referral to headache specialist for management of headaches in the future.    Joaquín placed to patient and plan discussed  He noted that he cannot take NSAIDs due to his gastric surgery in the past.  He has some zofran for nausea, so would prefer to take that as he knows he tolerates it.  He is in agreement with the other parts of the plan    Patient was advised to seek urgent medical assistance if there are other concerns such as weakness, confusion, loss of balance, incontinence.    No other questions at this time

## 2021-07-23 NOTE — TELEPHONE ENCOUNTER
"  RECORDS RECEIVED FROM: Internal Dx. Migraine    Date of Appt: 8/5/2021   NOTES (FOR ALL VISITS) STATUS DETAILS   OFFICE NOTE from referring provider Internal 5/19/2021 OV from Lesly Snyder MD   OFFICE NOTE from other specialist Care Everywhere 5/8/2020 OV from Rakel Fernández M.D., Ph.D.  ([facility])     DISCHARGE SUMMARY from hospital N/A    DISCHARGE REPORT from the ER Care Everywhere 6/21/2021 ED note from [facility]    4/20/2021 ED note from [facility]     *several ED notes in Care Everywhere    OPERATIVE REPORT N/A    MEDICATION LIST Internal    IMAGING  (FOR ALL VISITS)     EMG N/A    EEG Internal  4/8/2021   LUMBAR PUNCTURE N/A    KELSEY SCAN N/A    ULTRASOUND (CAROTID BILAT) *VASCULAR* N/A    MRI (HEAD, NECK, SPINE) Received [id number] MRIs from [facility] in PACS    CT (HEAD, NECK, SPINE) In process *[id number] CTs from [facility] in PACS     6/19/2021 & 4/27/2021 CT Head Brain - [facility]        Records Requested  07/23/21    Facility  [address] [address]  Hospital: [phone]   [facility] FAX Number: [phone]   Outcome Sent a request for images to be pushed or mailed out      Action 7/28/21 MV 1.32pm   Action Taken Imaging disk received from [facility] via mail. Sent to N for processing    \"multiple studies\"         "

## 2021-08-05 ENCOUNTER — PRE VISIT (OUTPATIENT)
Dept: NEUROLOGY | Facility: CLINIC | Age: 36
End: 2021-08-05

## 2021-08-05 ENCOUNTER — VIRTUAL VISIT (OUTPATIENT)
Dept: NEUROLOGY | Facility: CLINIC | Age: 36
End: 2021-08-05
Attending: PSYCHIATRY & NEUROLOGY
Payer: COMMERCIAL

## 2021-08-05 DIAGNOSIS — G43.709 CHRONIC MIGRAINE WITHOUT AURA WITHOUT STATUS MIGRAINOSUS, NOT INTRACTABLE: Primary | ICD-10-CM

## 2021-08-05 PROCEDURE — 99215 OFFICE O/P EST HI 40 MIN: CPT | Mod: 95 | Performed by: PSYCHIATRY & NEUROLOGY

## 2021-08-05 RX ORDER — CEFUROXIME AXETIL 250 MG/1
6 TABLET ORAL
Qty: 9 KIT | Refills: 11 | Status: SHIPPED | OUTPATIENT
Start: 2021-08-05 | End: 2021-11-05

## 2021-08-05 RX ORDER — SUMATRIPTAN 20 MG/1
1 SPRAY NASAL
Qty: 18 EACH | Refills: 3 | Status: SHIPPED | OUTPATIENT
Start: 2021-08-05 | End: 2021-08-05

## 2021-08-05 RX ORDER — TOPIRAMATE 25 MG/1
100 TABLET, FILM COATED ORAL EVERY EVENING
Qty: 120 TABLET | Refills: 3 | Status: SHIPPED | OUTPATIENT
Start: 2021-08-05 | End: 2021-11-05

## 2021-08-05 NOTE — PROGRESS NOTES
Javi is a 35 year old who is being evaluated via a billable video visit.      How would you like to obtain your AVS? MyChart  If the video visit is dropped, the invitation should be resent by: Text to cell phone: [phone]  Will anyone else be joining your video visit? No      Video Start Time: 9:33 AM  Video-Visit Details    Type of service:  Video Visit    Video End Time:10:17 AM    Originating Location (pt. Location): Home    Distant Location (provider location):  [facility] NEUROLOGY CLINIC [place]     Platform used for Video Visit: [facility]     MIGRAINE DISABILITY ASSESSMENT (MIDAS)    On how many days in the last 3 months did you miss work or school because of your headaches?  2    How many days in the last 3 months was your productivity at work or school reduced by half or more because of your headaches? (Do not include days you counted in question 1 where you missed work or school.)  3    On how many days in the last 3 months did you not do household work (such as housework, home repairs and maintenance, shopping, caring for children and relatives) because of your headaches?  0    How many days in the last 3 months was your productivity in household work reduced by half or more because of your headaches? (Do not include days you counted in question 3 where you did not do household work).  3    On how many days in the last 3 months did you miss family, social, or lesiure activities because of your headaches?  0    MIDAS Total Score:     On how many days in the last 3 months did you have a headache? (If a headache lasted more than 1 day, count each day.)   7    On a scale of 0 - 10, on average how painful were these headaches (where 0 = no pain at all, and 10 = pain as bad as it can be.)  6-7    [facility]   Clinics and Surgery Center  Virtual Neurology Consult     Javi Brown MRN# [id number]   YOB: 1985 Age: 35 year old     Requesting physician:  Lesly Snyder MD         Assessment and Recommendations:     Javi Brown is a 35 year old male w/ PMHx anxiety, depression, seizure/PNES on lamotrigine, and s/p bariatric surgery who presents to clinic today for assessment of headaches. Per pt report, he has a long history of headaches beginning with he was in 4th grade and continuing to present day. These are typically a throbbing pain w/ associated photo/phonophobia and nausea. There has been an uptick in frequency in the past 3mo, which is also around the time he began developing seizure, noting also that sometimes his HA will provoke a seizure episode. Currently, has a HA roughly 3-4x per week, often last 1-2 days. Has been given a small amount of Sumatriptan in the past 3 months to try, which typically helps along with Tylenol. With this in mind, pt's headaches meet criteria for chronic migraine without aura. Most likely, there is a genetic component at play given onset at young age and pos FHx. As such, would recommend the use of both preventative and acute medications. For acute medications, pt affirms that he finds good relief w/ his previously prescribed Sumatriptan, but will increase to a 9 pill/mo prescription. As to a preventative therapy, would recommend Topamax given pt's seizure hx and desire for continued weight loss. Would avoid beta-blockers given depression history. Would also avoid TCA's as they are known to lower the seizure threshold and would be concerned for excess side-effects w/ his concurrent medications. In addition to the medication changes, pt reports that his headaches and seizures have changed and become more frequent in the past 3 months. I believe this warrants updated imaging and will order an MRI Brain for further clarification    - MRI Brain w and wo contrast  - For Acute Therapy: Continue to take injectable Sumatriptan at onset of headache. May repeat injection in 2 hours if headache is still present. Not to exceed use of 9  days per month due to risk of medication overuse  - For Preventative Therapy: will start Topamax 25mg at bedtime for 1 week. If well tolerated, may increase to 50mg at bedtime for 1 week. If well tolerated, may increase to 75mg at bedtime for 1 week. If well tolerated, will increase to goal dose of 100mg at bedtime to be continued from then on  - Follow-up in 3 months    Patient seen with Anita Kowalski MD, neurology resident physician.    I spent 49 minutes on patient care and documentation.    Swati Hernandez MD  Neurology            Chief Complaint:     Chief Complaint   Patient presents with     New Patient     Referred per Lesly Snyder MD - Migraines           History is obtained from the patient and medical record.  Patient was seen via a virtual visit in their home due to the Covid 19 global pandemic.      Javi Brown is a 35 year old male who has been living with headaches since 4th grade. They worsened since developing seizures.    Previously, they were left retro-orbital pain. Now, his temples are affected as well. He describes pain as throbbing and piercing pain. They can be severe and last 1-2 days. He has 15/30 headache days per month for the past 3 months. This is more than previously, when he had 0-1/30 headache days per month. They gradually increased over time prior to three months ago.    He denies a known inciting event. He has had seizures since his headaches worsened.    He has had associated nausea/vomiting, photophobia, phonophobia.     He denies typical aura. He sometimes feels woozy.     For treatment, he takes sumatriptan injectables as needed. He has 8 doses available, which he used over 7 months, approximately, he reports.     He also uses extra strength Tylenol about four days per week. These are sometimes effective.    He has been instructed to avoid NSAIDs due to previous bariatric surgery.    He takes ondansetron as needed for nausea related to weight loss, he reports.    When  watching television, he sometimes notes images are smaller than he knows them to be. He prefers to watch television in black and white. He also describes floaters in his vision. This is not typically associated with headaches.    He has left cheek numbness and maria del carmen vu prior to seizure.     He is treated by Dr. Snyder for seizure and non-epileptic spells. He takes lamotrigine for treatment; he doesn't think this has affected his headaches. He takes sertraline and buspirone, which aren't helpful for headaches, he reports.            Past Medical History:     hx of epileptic seizure and non-epileptic seizure          Past Surgical History:   Bariatric surgery          Social History:     He drinks caffeine in sodas, about once a week. He denies smoking, rare alcohol, denies drug use.          Family History:   There is a family history of headache in his younger sister, mother, maternal aunts.          Allergies:      Allergies   Allergen Reactions     Benadryl [Diphenhydramine]      Reglan [Metoclopramide]      Droperidol Unknown, Anxiety and Other (See Comments)     No reaction specified               Medications:     Current Outpatient Medications:      acetaminophen 500 MG CAPS, Take 500 mg by mouth as needed , Disp: , Rfl:      busPIRone (BUSPAR) 10 MG tablet, Take 10 mg by mouth daily, Disp: , Rfl:      cyanocobalamin (VITAMIN B-12) 1000 MCG SUBL sublingual tablet, Place 1,000 mcg under the tongue daily, Disp: , Rfl:      diazepam (DIAZEPAM INTENSOL) 5 MG/ML (HIGH CONC) solution, Diazepam: Give 1 ml (5mg): Give 1 ml for seizures greater 5 minutes or more than 2 seizures within an 8 hour period. May repeat once 1 ml (5mg). Monitor breathing, if there any concerns call 911. Do not exceed 10 mg per day., Disp: 30 mL, Rfl: 0     ergocalciferol (ERGOCALCIFEROL) 1.25 MG (10169 UT) capsule, Take 50,000 Units by mouth every 7 days Thursdays  - Vitamin D2, Disp: , Rfl:      hydrOXYzine (VISTARIL) 25 MG capsule, Take 25 mg  by mouth every evening , Disp: , Rfl:      lamoTRIgine (LAMICTAL ODT) 200 MG TBDP ODT tab, Increase lamotrigine 200 mg morning, 200 mg noon and 100 mg evening., Disp: 225 tablet, Rfl: 3     ONDANSETRON PO, , Disp: , Rfl:      Pediatric Multi Vit-Extra C-FA (FLINTSTONES/EXTRA C) CHEW, Take 1 tablet by mouth every morning , Disp: , Rfl:      sertraline (ZOLOFT) 100 MG tablet, Take 200 mg by mouth every evening , Disp: , Rfl:      SUMAtriptan (IMITREX) 6 MG/0.5ML injection, Inject 6 mg Subcutaneous at onset of headache for migraine , Disp: , Rfl:      traZODone (DESYREL) 50 MG tablet, Take  mg by mouth At Bedtime , Disp: , Rfl:           Physical Exam:   There were no vitals taken for this visit.   Physical Exam:   General: NAD  Neurologic:   Mental Status Exam: Alert, awake and oriented to name, date, situation. Delayed recall 1/3, 3/3 with choices. Naming intact. No dysarthria. Speech of normal fluency. Able to complete serial 7's.   Cranial Nerves: Pupils equal, EOMs intact, dysconjugate with neurtral gaze with left esotropia greater than right esotropia, no nystagmus, facial movements symmetric, hearing intact to conversation, tongue midline and fully mobile. No tongue atrophy or fasciculations.    Motor: No drift in upper extremities. No tremors or abnormal movements noted.   Coordination: With arms outstretched, able to touch nose using index finger accurately bilaterally.  Normal finger tapping bilaterally.     Gait: Normal stance and casual gait.  Normal Romberg with some sway.  Neck: Normal range of motion with lateral head movements and neck flexion.  Eyes: No conjunctival injection, no scleral icterus.           Data:     MRI brain w and wo contrast (1/21/2021): completed through [facility], normal MRI of the brain

## 2021-08-05 NOTE — LETTER
8/5/2021       RE: Javi Brown  [address] [place] [address]     Dear Colleague,    Thank you for referring your patient, Javi Brown, to the [facility] NEUROLOGY CLINIC [place] at [facility]. Please see a copy of my visit note below.    MIGRAINE DISABILITY ASSESSMENT (MIDAS)    On how many days in the last 3 months did you miss work or school because of your headaches?  2    How many days in the last 3 months was your productivity at work or school reduced by half or more because of your headaches? (Do not include days you counted in question 1 where you missed work or school.)  3    On how many days in the last 3 months did you not do household work (such as housework, home repairs and maintenance, shopping, caring for children and relatives) because of your headaches?  0    How many days in the last 3 months was your productivity in household work reduced by half or more because of your headaches? (Do not include days you counted in question 3 where you did not do household work).  3    On how many days in the last 3 months did you miss family, social, or lesiure activities because of your headaches?  0    MIDAS Total Score:     On how many days in the last 3 months did you have a headache? 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 Per  pt report, he has a long history of headaches beginning with he was in 4th grade and continuing to present day. These are typically a throbbing pain w/ associated photo/phonophobia and nausea. There has been an uptick in frequency in the past 3mo, which is also around the time he began developing seizure, noting also that sometimes his HA will provoke a seizure episode. Currently, has a HA roughly 3-4x per week, often last 1-2 days. Has been given a small amount of Sumatriptan in the past 3 months to try, which typically helps along with Tylenol. With this in mind, pt's headaches meet criteria for chronic migraine without aura. Most likely, there is a genetic component at play given onset at young age and pos FHx. As such, would recommend the use of both preventative and acute medications. For acute medications, pt affirms that he finds good relief w/ his previously prescribed Sumatriptan, but will increase to a 9 pill/mo prescription. As to a preventative therapy, would recommend Topamax given pt's seizure hx and desire for continued weight loss. Would avoid beta-blockers given depression history. Would also avoid TCA's as they are known to lower the seizure threshold and would be concerned for excess side-effects w/ his concurrent medications. In addition to the medication changes, pt reports that his headaches and seizures have changed and become more frequent in the past 3 months. I believe this warrants updated imaging and will order an MRI Brain for further clarification    - MRI Brain w and wo contrast  - For Acute Therapy: Continue to take injectable Sumatriptan at onset of headache. May repeat injection in 2 hours if headache is still present. Not to exceed use of 9 days per month due to risk of medication overuse  - For Preventative Therapy: will start Topamax 25mg at bedtime for 1 week. If well tolerated, may increase to 50mg at bedtime for 1 week. If well tolerated, may increase to 75mg at bedtime for 1  week. If well tolerated, will increase to goal dose of 100mg at bedtime to be continued from then on  - Follow-up in 3 months    Patient seen with Anita Kowalski MD, neurology resident physician.    I spent 49 minutes on patient care and documentation.    Swati Hernandez MD  Neurology            Chief Complaint:     Chief Complaint   Patient presents with     New Patient     Referred per Lesly Snyder MD - Migraines           History is obtained from the patient and medical record.  Patient was seen via a virtual visit in their home due to the Covid 19 global pandemic.      Javi Brown is a 35 year old male who has been living with headaches since 4th grade. They worsened since developing seizures.    Previously, they were left retro-orbital pain. Now, his temples are affected as well. He describes pain as throbbing and piercing pain. They can be severe and last 1-2 days. He has 15/30 headache days per month for the past 3 months. This is more than previously, when he had 0-1/30 headache days per month. They gradually increased over time prior to three months ago.    He denies a known inciting event. He has had seizures since his headaches worsened.    He has had associated nausea/vomiting, photophobia, phonophobia.     He denies typical aura. He sometimes feels woozy.     For treatment, he takes sumatriptan injectables as needed. He has 8 doses available, which he used over 7 months, approximately, he reports.     He also uses extra strength Tylenol about four days per week. These are sometimes effective.    He has been instructed to avoid NSAIDs due to previous bariatric surgery.    He takes ondansetron as needed for nausea related to weight loss, he reports.    When watching television, he sometimes notes images are smaller than he knows them to be. He prefers to watch television in black and white. He also describes floaters in his vision. This is not typically associated with headaches.    He has left  cheek numbness and maria del carmen vu prior to seizure.     He is treated by Dr. Snyder for seizure and non-epileptic spells. He takes lamotrigine for treatment; he doesn't think this has affected his headaches. He takes sertraline and buspirone, which aren't helpful for headaches, he reports.            Past Medical History:     hx of epileptic seizure and non-epileptic seizure          Past Surgical History:   Bariatric surgery          Social History:     He drinks caffeine in sodas, about once a week. He denies smoking, rare alcohol, denies drug use.          Family History:   There is a family history of headache in his younger sister, mother, maternal aunts.          Allergies:      Allergies   Allergen Reactions     Benadryl [Diphenhydramine]      Reglan [Metoclopramide]      Droperidol Unknown, Anxiety and Other (See Comments)     No reaction specified               Medications:     Current Outpatient Medications:      acetaminophen 500 MG CAPS, Take 500 mg by mouth as needed , Disp: , Rfl:      busPIRone (BUSPAR) 10 MG tablet, Take 10 mg by mouth daily, Disp: , Rfl:      cyanocobalamin (VITAMIN B-12) 1000 MCG SUBL sublingual tablet, Place 1,000 mcg under the tongue daily, Disp: , Rfl:      diazepam (DIAZEPAM INTENSOL) 5 MG/ML (HIGH CONC) solution, Diazepam: Give 1 ml (5mg): Give 1 ml for seizures greater 5 minutes or more than 2 seizures within an 8 hour period. May repeat once 1 ml (5mg). Monitor breathing, if there any concerns call 911. Do not exceed 10 mg per day., Disp: 30 mL, Rfl: 0     ergocalciferol (ERGOCALCIFEROL) 1.25 MG (86711 UT) capsule, Take 50,000 Units by mouth every 7 days Thursdays  - Vitamin D2, Disp: , Rfl:      hydrOXYzine (VISTARIL) 25 MG capsule, Take 25 mg by mouth every evening , Disp: , Rfl:      lamoTRIgine (LAMICTAL ODT) 200 MG TBDP ODT tab, Increase lamotrigine 200 mg morning, 200 mg noon and 100 mg evening., Disp: 225 tablet, Rfl: 3     ONDANSETRON PO, , Disp: , Rfl:      Pediatric Multi  Vit-Extra C-FA (FLINTSTONES/EXTRA C) CHEW, Take 1 tablet by mouth every morning , Disp: , Rfl:      sertraline (ZOLOFT) 100 MG tablet, Take 200 mg by mouth every evening , Disp: , Rfl:      SUMAtriptan (IMITREX) 6 MG/0.5ML injection, Inject 6 mg Subcutaneous at onset of headache for migraine , Disp: , Rfl:      traZODone (DESYREL) 50 MG tablet, Take  mg by mouth At Bedtime , Disp: , Rfl:           Physical Exam:   There were no vitals taken for this visit.   Physical Exam:   General: NAD  Neurologic:   Mental Status Exam: Alert, awake and oriented to name, date, situation. Delayed recall 1/3, 3/3 with choices. Naming intact. No dysarthria. Speech of normal fluency. Able to complete serial 7's.   Cranial Nerves: Pupils equal, EOMs intact, dysconjugate with neurtral gaze with left esotropia greater than right esotropia, no nystagmus, facial movements symmetric, hearing intact to conversation, tongue midline and fully mobile. No tongue atrophy or fasciculations.    Motor: No drift in upper extremities. No tremors or abnormal movements noted.   Coordination: With arms outstretched, able to touch nose using index finger accurately bilaterally.  Normal finger tapping bilaterally.     Gait: Normal stance and casual gait.  Normal Romberg with some sway.  Neck: Normal range of motion with lateral head movements and neck flexion.  Eyes: No conjunctival injection, no scleral icterus.           Data:     MRI brain w and wo contrast (1/21/2021): completed through [facility], normal MRI of the brain        Again, thank you for allowing me to participate in the care of your patient.      Sincerely,    Swati Hernandez MD

## 2021-08-05 NOTE — PATIENT INSTRUCTIONS
- MRI Brain w and wo contrast  - For Acute Therapy: Continue to take injectable Sumatriptan at onset of headache. May repeat injection in 2 hours if headache is still present. Not to exceed use of 9 days per month due to risk of medication overuse  - For Preventative Therapy: will start Topamax 25mg at bedtime for 1 week. If well tolerated, may increase to 50mg at bedtime for 1 week. If well tolerated, may increase to 75mg at bedtime for 1 week. If well tolerated, will increase to goal dose of 100mg at bedtime to be continued from then on  - Follow-up in 3 months

## 2021-08-10 ENCOUNTER — VIRTUAL VISIT (OUTPATIENT)
Dept: NEUROLOGY | Facility: CLINIC | Age: 36
End: 2021-08-10
Payer: COMMERCIAL

## 2021-08-10 DIAGNOSIS — R56.9 SEIZURE (H): ICD-10-CM

## 2021-08-10 RX ORDER — LAMOTRIGINE 200 MG/1
TABLET, ORALLY DISINTEGRATING ORAL
Qty: 225 TABLET | Refills: 3 | Status: SHIPPED | OUTPATIENT
Start: 2021-08-10 | End: 2021-10-12
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
## 2021-08-17 ENCOUNTER — HOSPITAL ENCOUNTER (OUTPATIENT)
Dept: MRI IMAGING | Facility: CLINIC | Age: 36
Discharge: HOME | End: 2021-08-17
Attending: PSYCHIATRY & NEUROLOGY | Admitting: PSYCHIATRY & NEUROLOGY
Payer: COMMERCIAL

## 2021-08-17 DIAGNOSIS — G43.709 CHRONIC MIGRAINE WITHOUT AURA WITHOUT STATUS MIGRAINOSUS, NOT INTRACTABLE: ICD-10-CM

## 2021-08-17 PROCEDURE — 70553 MRI BRAIN STEM W/O & W/DYE: CPT

## 2021-08-17 PROCEDURE — 255N000002 HC RX 255 OP 636: Performed by: PSYCHIATRY & NEUROLOGY

## 2021-08-17 PROCEDURE — A9585 GADOBUTROL INJECTION: HCPCS | Performed by: PSYCHIATRY & NEUROLOGY

## 2021-08-17 RX ORDER — GADOBUTROL 604.72 MG/ML
11 INJECTION INTRAVENOUS ONCE
Status: COMPLETED | OUTPATIENT
Start: 2021-08-17 | End: 2021-08-17

## 2021-08-17 RX ADMIN — GADOBUTROL 11 ML: 604.72 INJECTION INTRAVENOUS at 19:02

## 2021-10-10 ENCOUNTER — HEALTH MAINTENANCE LETTER (OUTPATIENT)
Age: 36
End: 2021-10-10

## 2021-10-11 ENCOUNTER — TELEPHONE (OUTPATIENT)
Dept: NEUROLOGY | Facility: CLINIC | Age: 36
End: 2021-10-11

## 2021-10-11 DIAGNOSIS — R56.9 SEIZURE (H): ICD-10-CM

## 2021-10-11 NOTE — TELEPHONE ENCOUNTER
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  He reports that he had arrived at work, but did not feel right.  He was still sitting in the car, and felt weird so he called in to to the building, co-workers came out to check on him - they called EMS.  He then has a loss of awareness until the EMS was asking him to get on the gurney.  He was told he looked distant, and was pulling at his shirt  He had \"multiple seizure\" a couple of minutes apart.    No convulsions noted.    The ambulance crew assessed him, and took him to the hospital.  No blood levels were drawn, no scans performed  He had another episode of reduced awareness while in the hosptial.    Patient was in the hospital for about three hours.  Patient was given a dose of his lamotrigine while at the hospital    MARTINEZ 200-200-100   mg at bedtime    Buspar has been increased to 30mg every day by mental health providers  No missed doses  No other changes  No nausea, vomiting  No fever    Patient reports a severe migraine following the episode. He did not use his Imitrex    No other questions at this time  Scheduled follow up reviewed    Will route to  for recommendations.      "

## 2021-10-11 NOTE — TELEPHONE ENCOUNTER
What is the concern that needs to be addressed by a nurse? Patient would like a call back to discuss a difficult seizure he had yesterday.     May a detailed message be left on voicemail? yes    Date of last office visit: 08/10/2021    Message routed to: [facility] RN POOL

## 2021-10-12 RX ORDER — LAMOTRIGINE 100 MG/1
TABLET, ORALLY DISINTEGRATING ORAL
Qty: 180 TABLET | Refills: 1 | Status: SHIPPED | OUTPATIENT
Start: 2021-10-12 | End: 2021-11-22

## 2021-10-12 RX ORDER — LAMOTRIGINE 200 MG/1
200 TABLET, ORALLY DISINTEGRATING ORAL 2 TIMES DAILY
Qty: 180 TABLET | Refills: 1 | Status: SHIPPED | OUTPATIENT
Start: 2021-10-12 | End: 2021-11-22

## 2021-10-12 RX ORDER — LAMOTRIGINE 50 MG/1
50 TABLET, ORALLY DISINTEGRATING ORAL EVERY MORNING
Qty: 14 TABLET | Refills: 0 | Status: SHIPPED | OUTPATIENT
Start: 2021-10-12 | End: 2021-11-22

## 2021-10-12 NOTE — TELEPHONE ENCOUNTER
"Per Lesly Yan MD Hamley, David, RN  Caller: Unspecified (Yesterday,  2:09 PM)  Increase lamotrigine 250-200-100 for 2 weeks and then 300-200-100. Have a meal then take lamotrigine. Focus on emotional health. I can follow up  with him in 6-8 weeks   \"    Call placed to patient.  Plan discussed - will send a [employer] message to patient with instructions also.  Video follow up arranged  Preferred pharmacy confirmed.  Prescriptions placed as per MD instructions  No other questions at this time        "

## 2021-11-05 ENCOUNTER — HOSPITAL ENCOUNTER (EMERGENCY)
Facility: CLINIC | Age: 36
Discharge: HOME | End: 2021-11-05
Attending: EMERGENCY MEDICINE | Admitting: EMERGENCY MEDICINE
Payer: COMMERCIAL

## 2021-11-05 ENCOUNTER — OFFICE VISIT (OUTPATIENT)
Dept: NEUROLOGY | Facility: CLINIC | Age: 36
End: 2021-11-05
Payer: COMMERCIAL

## 2021-11-05 ENCOUNTER — DOCUMENTATION ONLY (OUTPATIENT)
Dept: LAB | Facility: CLINIC | Age: 36
End: 2021-11-05

## 2021-11-05 VITALS
BODY MASS INDEX: 36.22 KG/M2 | WEIGHT: 253 LBS | DIASTOLIC BLOOD PRESSURE: 77 MMHG | HEIGHT: 70 IN | RESPIRATION RATE: 16 BRPM | OXYGEN SATURATION: 96 % | HEART RATE: 59 BPM | SYSTOLIC BLOOD PRESSURE: 136 MMHG

## 2021-11-05 VITALS
WEIGHT: 252 LBS | HEIGHT: 70 IN | RESPIRATION RATE: 16 BRPM | OXYGEN SATURATION: 99 % | TEMPERATURE: 97.7 F | SYSTOLIC BLOOD PRESSURE: 129 MMHG | DIASTOLIC BLOOD PRESSURE: 93 MMHG | HEART RATE: 53 BPM | BODY MASS INDEX: 36.08 KG/M2

## 2021-11-05 DIAGNOSIS — R56.9 SEIZURES (H): ICD-10-CM

## 2021-11-05 DIAGNOSIS — G43.709 CHRONIC MIGRAINE WITHOUT AURA WITHOUT STATUS MIGRAINOSUS, NOT INTRACTABLE: ICD-10-CM

## 2021-11-05 LAB
ANION GAP SERPL CALCULATED.3IONS-SCNC: 6 MMOL/L (ref 3–14)
BUN SERPL-MCNC: 16 MG/DL (ref 7–30)
CALCIUM SERPL-MCNC: 8.8 MG/DL (ref 8.5–10.1)
CHLORIDE BLD-SCNC: 111 MMOL/L (ref 94–109)
CO2 SERPL-SCNC: 25 MMOL/L (ref 20–32)
CREAT SERPL-MCNC: 0.97 MG/DL (ref 0.66–1.25)
GFR SERPL CREATININE-BSD FRML MDRD: >90 ML/MIN/1.73M2
GLUCOSE BLD-MCNC: 82 MG/DL (ref 70–99)
POTASSIUM BLD-SCNC: 3.9 MMOL/L (ref 3.4–5.3)
SODIUM SERPL-SCNC: 142 MMOL/L (ref 133–144)

## 2021-11-05 PROCEDURE — 80048 BASIC METABOLIC PNL TOTAL CA: CPT | Performed by: EMERGENCY MEDICINE

## 2021-11-05 PROCEDURE — 99213 OFFICE O/P EST LOW 20 MIN: CPT | Performed by: PSYCHIATRY & NEUROLOGY

## 2021-11-05 PROCEDURE — 36415 COLL VENOUS BLD VENIPUNCTURE: CPT | Performed by: EMERGENCY MEDICINE

## 2021-11-05 PROCEDURE — 99284 EMERGENCY DEPT VISIT MOD MDM: CPT | Performed by: EMERGENCY MEDICINE

## 2021-11-05 PROCEDURE — 80201 ASSAY OF TOPIRAMATE: CPT | Performed by: EMERGENCY MEDICINE

## 2021-11-05 PROCEDURE — 99283 EMERGENCY DEPT VISIT LOW MDM: CPT | Performed by: EMERGENCY MEDICINE

## 2021-11-05 PROCEDURE — 80175 DRUG SCREEN QUAN LAMOTRIGINE: CPT | Performed by: EMERGENCY MEDICINE

## 2021-11-05 RX ORDER — CEFUROXIME AXETIL 250 MG/1
6 TABLET ORAL
Qty: 9 KIT | Refills: 11 | Status: SHIPPED | OUTPATIENT
Start: 2021-11-05 | End: 2022-11-07

## 2021-11-05 RX ORDER — SUMATRIPTAN 20 MG/1
SPRAY NASAL
COMMUNITY
Start: 2021-08-05 | End: 2022-04-08

## 2021-11-05 RX ORDER — TOPIRAMATE 100 MG/1
100 TABLET, FILM COATED ORAL EVERY EVENING
Qty: 30 TABLET | Refills: 11 | Status: SHIPPED | OUTPATIENT
Start: 2021-11-05 | End: 2021-11-22

## 2021-11-05 ASSESSMENT — ENCOUNTER SYMPTOMS
COUGH: 0
DYSURIA: 0
SHORTNESS OF BREATH: 0
ABDOMINAL PAIN: 0
FEVER: 0
DIARRHEA: 0
VOMITING: 0

## 2021-11-05 ASSESSMENT — PAIN SCALES - GENERAL: PAINLEVEL: NO PAIN (0)

## 2021-11-05 ASSESSMENT — MIFFLIN-ST. JEOR
SCORE: 2069.31
SCORE: 2073.85

## 2021-11-05 NOTE — NURSING NOTE
Chief Complaint   Patient presents with     RECHECK     [facility] Return Headache     Carlos Enrique Spaulding

## 2021-11-05 NOTE — ED PROVIDER NOTES
ED Provider Note  [facility]      History     Chief Complaint   Patient presents with     Seizures     The history is provided by the patient and medical records.     Javi Brown is a 36 year old male with a medical history of seizures and chronic headaches who presents to the emergency department for a seizure.  Patient had just finished an appointment with neurology whom he was seeing for chronic headaches.  After his appointment, patient states he remembers going to the bathroom, then being on the floor. Patient reports being in postictal state for longer than usual. Patient denies loss of bladder control, injury due to seizure, fever, cough, chest pain, vomiting, or diarrhea. Patient notes he has constant headaches which he sees a neurologist for. Patient also notes poor sleep recently. Patient is taking diazepam, topiramate, and Lamictal as prescribed. Patient notes that his Lamictal dosage has increased due to his last seizure 2 weeks ago.       Past Medical History  Past Medical History:   Diagnosis Date     Gastric bypass status for obesity      No past surgical history on file.  acetaminophen 500 MG CAPS  busPIRone HCl (BUSPAR) 30 MG tablet  cyanocobalamin (VITAMIN B-12) 1000 MCG SUBL sublingual tablet  diazepam (DIAZEPAM INTENSOL) 5 MG/ML (HIGH CONC) solution  ergocalciferol (ERGOCALCIFEROL) 1.25 MG (15103 UT) capsule  hydrOXYzine (VISTARIL) 25 MG capsule  lamoTRIgine (LAMICTAL ODT) 200 MG TBDP ODT tab  lamoTRIgine (LAMICTAL ODT) 50 MG TBDP ODT tab  lamoTRIgine (LAMICTAL) 100 MG TBDP ODT tab  ONDANSETRON PO  Pediatric Multi Vit-Extra C-FA (FLINTSTONES/EXTRA C) CHEW  sertraline (ZOLOFT) 100 MG tablet  SUMAtriptan (IMITREX STATDOSE) 6 MG/0.5ML pen injector kit  SUMAtriptan (IMITREX) 20 MG/ACT nasal spray  SUMAtriptan (IMITREX) 6 MG/0.5ML injection  topiramate (TOPAMAX) 100 MG tablet  traZODone (DESYREL) 50 MG tablet      Allergies   Allergen Reactions     Benadryl  "[Diphenhydramine]      Reglan [Metoclopramide]      Droperidol Unknown, Anxiety and Other (See Comments)     No reaction specified       Family History  No family history on file.  Social History   Social History     Tobacco Use     Smoking status: Never Smoker     Smokeless tobacco: Never Used   Substance Use Topics     Alcohol use: Never     Drug use: Never      Past medical history, past surgical history, medications, allergies, family history, and social history were reviewed with the patient. No additional pertinent items.       Review of Systems   Constitutional: Negative for fever.   Respiratory: Negative for cough and shortness of breath.    Cardiovascular: Negative for chest pain.   Gastrointestinal: Negative for abdominal pain, diarrhea and vomiting.   Genitourinary: Negative for dysuria.   All other systems reviewed and are negative.    A complete review of systems was performed with pertinent positives and negatives noted in the HPI, and all other systems negative.    Physical Exam   BP: (!) 149/89  Pulse: 59  Temp: 97.7  F (36.5  C)  Resp: 16  Height: 177.8 cm (5' 10\")  Weight: 114.3 kg (252 lb)  SpO2: 97 %  Physical Exam     GEN:  Well developed, no acute distress  HEENT:  PERRL, disconjugate gaze, unchanged from baseline, mucous membranes are moist.   Cardio:  RRR, no murmur, radial pulses equal bilaterally  PULM:  Lungs clear, good air movement, no wheezes, rales   Abd:  Soft, normal bowel sounds, no focal tenderness  Musculoskeletal:  normal range of motion, no lower extremity swelling or calf tenderness  Neuro:  Alert and oriented X3, Follows commands, CN exam is normal except for the disconjugate gaze, finger to nose is normal, sensation and strength is normal throughout, no pronator drift   Skin:  Warm, dry    ED Course     6:52 PM  The patient was seen and examined by Kami Jiménez MD in Room ED26.   Procedures       Patient is no longer feeling postictal, feels back to baseline. Lamictal and " Topamax levels were drawn and are pending.  Chemistry level was also done, reviewed by me, results are shown below.     Results for orders placed or performed during the hospital encounter of 11/05/21   Basic metabolic panel     Status: Abnormal   Result Value Ref Range    Sodium 142 133 - 144 mmol/L    Potassium 3.9 3.4 - 5.3 mmol/L    Chloride 111 (H) 94 - 109 mmol/L    Carbon Dioxide (CO2) 25 20 - 32 mmol/L    Anion Gap 6 3 - 14 mmol/L    Urea Nitrogen 16 7 - 30 mg/dL    Creatinine 0.97 0.66 - 1.25 mg/dL    Calcium 8.8 8.5 - 10.1 mg/dL    Glucose 82 70 - 99 mg/dL    GFR Estimate >90 >60 mL/min/1.73m2     Medications - No data to display     Assessments & Plan (with Medical Decision Making)   Patient was brought to the emergency department after having a seizure at the [facility].  Patient was somewhat tired and postictal, however, that is cleared now when he feels back to baseline.  He has a known seizure disorder and reports compliance with his prescribed medications.  He has no symptoms of an acute infection, his headaches are not worse than normal.  Lamictal and Trileptal levels were drawn and are pending at this time.  Patient was advised to return to the emergency department if he has another seizure within 24 hours of the first 1 today or if he has any other concerns.  He was advised to follow-up with his neurologist next week since he has been having increased seizures these last 2 weeks.    I have reviewed the nursing notes. I have reviewed the findings, diagnosis, plan and need for follow up with the patient.    Discharge Medication List as of 11/5/2021  8:18 PM          Final diagnoses:   Seizures (H)     Swati SERRANO, am serving as a trained medical scribe to document services personally performed by Kami Jiménez MD based on the provider's statements to me on November 5, 2021.  This document has been checked and approved by the attending provider.    Kami SERRANO  Tino MCCLOUD, was physically present and have reviewed and verified the accuracy of this note documented by Swati Mcclendon, medical scribe.      --  Kami Jiménez MD  [facility] EMERGENCY DEPARTMENT  11/5/2021     Kami Jiménez MD  11/06/21 0033

## 2021-11-05 NOTE — LETTER
"11/5/2021       RE: Javi Brown  [address] [place] [address]     Dear Colleague,    Thank you for referring your patient, Javi Brown, to the [facility] [place] at [facility]. Please see a copy of my visit note below.    [facility]  Neurology Progress Note    Subjective:    Mr. Brown returns for follow-up of headaches with a chronic migraine phenotype.  He also has a history of seizure and nonepileptic seizure on lamotrigine.  At her last visit I recommended a trial of topiramate for prevention and sumatriptan subcutaneous injection as needed.    Today, he reports that he was able to start topiramate and titrate up to 100 mg nightly.  He denies side effects, other than decrease in appetite, which is a welcome side effect to him.    His headaches are less severe and less frequent.  He can recall 2 severe headaches since I last saw him.  This is a significant decrease from last time, when he reported 15 headache days a month.    He reports that he had a seizure about a month ago. He had a severe migraine after this. He also recalls a bad headache yesterday.      His headaches are similar in quality. He takes sumatriptan 6 mg subcutaneous injection as needed. He does not prefer the nasal spray.     Objective:    Vitals: /77   Pulse 59   Resp 16   Ht 1.778 m (5' 10\")   Wt 114.8 kg (253 lb)   SpO2 96%   BMI 36.30 kg/m    General: Cooperative, NAD  Neurologic:  Mental Status: Fully alert, attentive and oriented. Speech clear and fluent.   Cranial Nerves: Facial movements symmetric.  Eye movements are disconjugate.  Motor: No abnormal movements.      Pertinent Investigations:    MRI brain w and wo contrast (8/17/2021):  1. A few scattered tiny nonspecific white matter lesions.  2. No evidence for intracranial hemorrhage, acute infarct, or any  focal " mass lesions.  3. Exam mildly limited by motion artifact.    Assessment/Plan:   Javi Brown is a 36-year-old man with seizure and nonepileptic seizure, and chronic migraine who returns for follow-up.  He had an MRI since I last saw him, and we reviewed the images together today.  They show T2 hyperintensities in the white matter, which are nonspecific.    He has had a good response to topiramate 100 mg nightly.  I have changed his prescription to the 100 mg tablet today.  -For acute treatment, sumatriptan remains effective, although he has had difficulty obtaining this.  We will try to assist in making sure that the subcutaneous injection prescription goes through.    I will plan to see him back in 6 to 12 months to monitor his progress, or sooner if needed.    Swati Hernandez MD  Neurology

## 2021-11-05 NOTE — PROGRESS NOTES
"[facility]  Neurology Progress Note    Subjective:    Mr. Brown returns for follow-up of headaches with a chronic migraine phenotype.  He also has a history of seizure and nonepileptic seizure on lamotrigine.  At her last visit I recommended a trial of topiramate for prevention and sumatriptan subcutaneous injection as needed.    Today, he reports that he was able to start topiramate and titrate up to 100 mg nightly.  He denies side effects, other than decrease in appetite, which is a welcome side effect to him.    His headaches are less severe and less frequent.  He can recall 2 severe headaches since I last saw him.  This is a significant decrease from last time, when he reported 15 headache days a month.    He reports that he had a seizure about a month ago. He had a severe migraine after this. He also recalls a bad headache yesterday.      His headaches are similar in quality. He takes sumatriptan 6 mg subcutaneous injection as needed. He does not prefer the nasal spray.     Objective:    Vitals: /77   Pulse 59   Resp 16   Ht 1.778 m (5' 10\")   Wt 114.8 kg (253 lb)   SpO2 96%   BMI 36.30 kg/m    General: Cooperative, NAD  Neurologic:  Mental Status: Fully alert, attentive and oriented. Speech clear and fluent.   Cranial Nerves: Facial movements symmetric.  Eye movements are disconjugate.  Motor: No abnormal movements.      Pertinent Investigations:    MRI brain w and wo contrast (8/17/2021):  1. A few scattered tiny nonspecific white matter lesions.  2. No evidence for intracranial hemorrhage, acute infarct, or any  focal mass lesions.  3. Exam mildly limited by motion artifact.    Assessment/Plan:   Javi Brown is a 36-year-old man with seizure and nonepileptic seizure, and chronic migraine who returns for follow-up.  He had an MRI since I last saw him, and we reviewed the images together today.  They show T2 hyperintensities in the " white matter, which are nonspecific.    He has had a good response to topiramate 100 mg nightly.  I have changed his prescription to the 100 mg tablet today.  -For acute treatment, sumatriptan remains effective, although he has had difficulty obtaining this.  We will try to assist in making sure that the subcutaneous injection prescription goes through.    I will plan to see him back in 6 to 12 months to monitor his progress, or sooner if needed.    Swati Hernandez MD  Neurology

## 2021-11-05 NOTE — NURSING NOTE
The pt was leaving the first floor bathroom when he felt a seizure come on and sat down. A staff member found him having a seizure and called RRT. Upon arrival the pt is slow to respond but can answer questions. Pt states that he has a hx of this and usually has multiple seizures in a row. He has been adjusting his medications lately. 20G IV place in the left forearm. CMS intact. After 15 minutes the pt states that he feels off and not normal. Pt states that he wants to go to the ED. 911 called. Upon EMS arrival pt report and care is handed off.

## 2021-11-05 NOTE — ED TRIAGE NOTES
Triage Assessment & Note:    Patient presents with: PT BIBA for seizures that occurred in the bathroom at [facility]. PT has had seizures in the past. PT was A&Ox4 upon EMS arrival. PT continues to report feeling sightly out of sorts. PT is A&Ox4.     Home Treatments/Remedies: None    Febrile / Afebrile? Afebrile     Duration of C/o: unknown     Kentrell Barnes RN  November 5, 2021

## 2021-11-06 NOTE — DISCHARGE INSTRUCTIONS
Please follow-up with your epileptologist or seizure medicine provider regarding your 2 seizures in the last 2 weeks.  You should also follow-up with your seizure medicine provider regarding the Lamictal and Topamax levels that were drawn today.  Please return to the emergency department if you have any repeat seizures in the next 24 hours, severe headache, fever, vomiting, any other concerns.

## 2021-11-07 DIAGNOSIS — G43.711 INTRACTABLE CHRONIC MIGRAINE WITHOUT AURA AND WITH STATUS MIGRAINOSUS: Primary | ICD-10-CM

## 2021-11-07 LAB
LAMOTRIGINE SERPL-MCNC: 2.1 UG/ML
TOPIRAMATE SERPL-MCNC: <1.5 UG/ML

## 2021-11-07 RX ORDER — METHYLPREDNISOLONE 4 MG
TABLET, DOSE PACK ORAL
Qty: 21 TABLET | Refills: 0 | Status: SHIPPED | OUTPATIENT
Start: 2021-11-07 | End: 2023-01-27

## 2021-11-07 NOTE — PROGRESS NOTES
Patient reports intractable migraine and break through seizures x 3. Went to ER 11/5 they did not give him anything.    1. Gave him medrol dose greg for migraine  2. Increase topiramate to 100mg BID for both seizures and migraine  3. Continue lamotrigine dose (recognizing this medicine may be driving migraines to be worse(  4. Contact Dr Snyder and Mary on Monday    Gayla Rosado

## 2021-11-08 ENCOUNTER — TELEPHONE (OUTPATIENT)
Dept: NEUROLOGY | Facility: CLINIC | Age: 36
End: 2021-11-08
Payer: COMMERCIAL

## 2021-11-08 NOTE — TELEPHONE ENCOUNTER
----- Message from Swati Hernandez MD sent at 11/8/2021  7:58 AM CST -----  Regarding: FW: Break through seizures and intractable migraine  Hi Aida,    Please call the patient to follow up on how he's doing today after ER visit. He should continue topiramate at the higher dose of 100 mg BID and complete the Medrol dosepak.     Swati Hernandez MD  ----- Message -----  From: Gayla Rosado MD  Sent: 11/7/2021   9:23 AM CST  To: Swati Hernandez MD, Lesly Snyder MD  Subject: Break through seizures and intractable migra#    Pt in ED Friday  Called Sunday had 2 more break through seizures that he thinks are triggered by intractable migraine. Told him to increase topiramate to BID dosing. Gave him medrol dose greg and told him not to go to work    He needs to be called on Monday    Gayla

## 2021-11-08 NOTE — TELEPHONE ENCOUNTER
Called and left a vm asking for a call back.  I want to know how he is doing after his ER visit .  I sent a PA for sumatriptan injectable.

## 2021-11-08 NOTE — TELEPHONE ENCOUNTER
Prior Authorization Retail Medication Request    Medication/Dose: SUMAtriptan (IMITREX STATDOSE) 6 MG/0.5ML pen   ICD code (if different than what is on RX):  Chronic migraine  Seizure  Previously Tried and Failed:  Would avoid beta-blockers given depression history. Would also avoid TCA's as they are known to lower the seizure threshold and would be concerned for excess side-effects w/ his concurrent medications    Rationale:  SUMAtriptan (IMITREX STATDOSE) 6 MG/0.5ML pen     Insurance Name:  [employer]  Insurance ID:  [id number]       Pharmacy Information (if different than what is on RX)  Name:    Phone:

## 2021-11-09 NOTE — TELEPHONE ENCOUNTER
Central Prior Authorization Team   Phone: [phone]      PA Initiation    Medication: SUMAtriptan (IMITREX STATDOSE) 6 MG/0.5ML pen   Insurance Company: [employer] - Phone [phone] Fax [phone]  Pharmacy Filling the Rx: [place], [place] - [address] [place] [address] AVE  Filling Pharmacy Phone: [phone]  Filling Pharmacy Fax:    Start Date: 11/9/2021

## 2021-11-10 ENCOUNTER — OFFICE VISIT (OUTPATIENT)
Dept: NEUROLOGY | Facility: CLINIC | Age: 36
End: 2021-11-10
Payer: COMMERCIAL

## 2021-11-10 DIAGNOSIS — G40.919 EPILEPSY WITH ALTERED CONSCIOUSNESS WITH INTRACTABLE EPILEPSY (H): Primary | ICD-10-CM

## 2021-11-10 DIAGNOSIS — F06.8 OTHER SPECIFIED MENTAL DISORDERS DUE TO KNOWN PHYSIOLOGICAL CONDITION: ICD-10-CM
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
## 2021-11-10 NOTE — PROGRESS NOTES
Patient was seen for neuropsychological evaluation at the request of Dr. Lesly Snyder, for the purposes of diagnostic clarification and treatment planning.  2 hrs 1 min of test administration and scoring were provided by this writer, Dayana Dover.  Please see Dr. Javi Grant's report for a full interpretation of the findings.

## 2021-11-11 NOTE — PROGRESS NOTES
Name: Javi Brown MRN: [id number]  : 1985 CHRISTIANSON: 1/10/2021  Staff: WILTON Tech:  Age: 36  Sex: Male Hand: Right Educ: 13-15  Vision: 20/20 ?with correction / ?without correction    ORIENTATION     Time  -0     Place       Personal info         WAIS-IV     WMI: 97       Raw SSa     Similarities  25 10     Block Design  24 6     Digit Span  27 9 RDS= 10     Arithmetic  15 10     Coding  53 7    VIVIEN-O    Raw    T %ile     Copy    35.0     >16     Time to Copy  407        WRAT5   SS      Word Reading  103     COWAT (FAS)   Raw: 30   T: 39    Animals   Raw:  18  T-score:  39    BOSTON NAMING TEST   Raw: 52   T: 36     COMPLEX IDEATIONAL MATERIAL   Raw:  T: 44    TRAILS  Raw  Err T    A 67  0 19   B 114  0 30    STROOP Raw T   Word 76 34   Color  49 29       Color/Word  42 47    FREY FACIAL RECOGNITION   Raw: 50  Interp: WNL    GROOVED PEGBOARD    Raw  T Drops   RH  110  22 0     27 1    BDI-II  Raw:  13  Interpretation: Minimal    CHUN  Raw:  4  Interpretation: Minimal    WMS-IV  Raw SS / %ile     LM I  19 7     LM II  18 8     LM Recog. 23  26-50    HVLT-R Form 1     Trial 1 2 3 Total      6 6 8  20  Raw T     Total Learning (1-3) 20 30     Delayed Recall  8 40     Percent Retention 100 55     Recognition Hits/FP 12/1 49    BVMT-R Form 1     Trial 1 2 3 Total      5 10 12  27  Raw T / %ile     Total Learning (1-3) 27 52     Delayed Recall  12 63     Percent Retention 100 >16th     Recognition Hits/FP 6/0 >16th    DCT E-score: 13    MMPI-2-RF   RCd: 67 VRIN-r:  68   RC1: 79 MEG-r:  50   RC2: 61 F-r:  65   RC3: 49 Fp-r:  59   RC4: 57 Fs:  83   RC6: 66 FBS-r:  73   RC7: 68 RBS:  76   RC8: 76 L-r:  37   RC9: 69 K-r:  35
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
## 2021-11-22 ENCOUNTER — VIRTUAL VISIT (OUTPATIENT)
Dept: NEUROLOGY | Facility: CLINIC | Age: 36
End: 2021-11-22
Payer: COMMERCIAL

## 2021-11-22 DIAGNOSIS — R56.9 SEIZURE (H): ICD-10-CM

## 2021-11-22 DIAGNOSIS — G43.709 CHRONIC MIGRAINE WITHOUT AURA WITHOUT STATUS MIGRAINOSUS, NOT INTRACTABLE: ICD-10-CM

## 2021-11-22 RX ORDER — TOPIRAMATE 100 MG/1
100 TABLET, FILM COATED ORAL EVERY EVENING
Qty: 60 TABLET | Refills: 11 | Status: SHIPPED | OUTPATIENT
Start: 2021-11-22 | End: 2021-11-22

## 2021-11-22 RX ORDER — LAMOTRIGINE 100 MG/1
TABLET, ORALLY DISINTEGRATING ORAL
Qty: 180 TABLET | Refills: 11 | Status: SHIPPED | OUTPATIENT
Start: 2021-11-22 | End: 2022-04-19

## 2021-11-22 RX ORDER — LAMOTRIGINE 200 MG/1
200 TABLET ORAL DAILY
COMMUNITY
End: 2022-04-08 | Stop reason: DRUGHIGH

## 2021-11-22 RX ORDER — TOPIRAMATE 100 MG/1
100 TABLET, FILM COATED ORAL EVERY EVENING
Qty: 60 TABLET | Refills: 11 | Status: SHIPPED | OUTPATIENT
Start: 2021-11-22 | End: 2021-11-23
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
## 2021-11-22 NOTE — PATIENT INSTRUCTIONS
Lamotrigine 300-200-100 and topiramate 100 mg twice a day   Get labs near home   [facility] nurse meeting     [place] regulations on driving were reviewed with you and you should not drive until you are three months seizure/spell free.You are prohibited from operating a motor vehicle within 3 months following any seizure or other episode with sudden unconsciousness or inability to sit up, and that it is required to report most recent seizure to the DMV within 30 days after the event.    Avoid any activities that might lead to self-injury or injury of others, within 3 months following any seizure with impaired awareness or impaired motor control such activities include but are not limited to operating power tools, operating firearms, climbing ladders/trees/exposure to heights from which he might fall. Do not operate power tools or heavy machinery and equipment.  Do not swim alone, bathe in any form of tub, such as bathtub, jacuzzi, or hot tub unless there is a responsible adult close by to provide assistance in case she has a seizure and drowns. Avoid work on hot surfaces such stoves, ovens, or with scalding hot water.     Lesly Snyder MD

## 2021-11-23 DIAGNOSIS — G43.709 CHRONIC MIGRAINE WITHOUT AURA WITHOUT STATUS MIGRAINOSUS, NOT INTRACTABLE: ICD-10-CM

## 2021-11-23 NOTE — TELEPHONE ENCOUNTER
Call received from [facility].    Topiramate quantity does not match with Sig.  Previous Rx was for one tab every morning,  Rx submitted yesterday is for one tab every evening.  Quantity is 60.  Pharmacy needs clarification of the dose.    Will route to MD for prescription correction.

## 2021-11-24 RX ORDER — TOPIRAMATE 100 MG/1
100 TABLET, FILM COATED ORAL 2 TIMES DAILY
Qty: 60 TABLET | Refills: 11 | Status: SHIPPED | OUTPATIENT
Start: 2021-11-24 | End: 2022-04-19

## 2021-11-30 NOTE — TELEPHONE ENCOUNTER
Prior Authorization Not Needed per Insurance. Was too soon to fill that that time.        Medication: SUMAtriptan (IMITREX STATDOSE) 6 MG/0.5ML pen   Insurance Company: [employer] - Phone [phone] Fax [phone]  Expected CoPay:      Pharmacy Filling the Rx: [address]  Pharmacy Notified:    Patient Notified:

## 2021-12-24 ENCOUNTER — TELEPHONE (OUTPATIENT)
Dept: NEUROLOGY | Facility: CLINIC | Age: 36
End: 2021-12-24
Payer: COMMERCIAL

## 2021-12-24 NOTE — TELEPHONE ENCOUNTER
Call placed to L[facility]OT[facility].    Patient is doing better overall however there are some patient reported symptoms they want to discuss/alert  to.    Last night he stated feeling weird, he was non-verbal, feeling funny.  Staff called the facility provider who advised watching the patient and calling the clinic in the morning    Patient slept throughout the night    This morning he seemed okay, however missed his breakfast, and took his morning meds on an empty stomach. He will usually take his meds with food.    Patient then reported tingling in his fingers (bilaterally), He was alert and orientated.  The only symptom was tingling in fingers, but he told staff that this will lead to a seizure    I reviewed that the patient has documented epileptic seizures and non-epileptic spells.  RN reported they do have a rescue med, but he has not received it as they have not seen an event that meets administration criteria  We discussed management of non-epileptic events by encouraging patient to practice relaxation techniques, such as focusing, or self guided imagary, and providing reassurance that he will be okay.    I instructed staff to call if there have any further questions or concerns  No other questions at this time

## 2021-12-24 NOTE — TELEPHONE ENCOUNTER
What is the concern that needs to be addressed by a nurse? RN for [facility] called to discuss potential seizure activity in patient. Patient took his morning meds without eating and is reporting tingling sensation in his finger which he has indicated as symptom of on coming seizure in the past. LULA Henley would like back to discuss and see if neuro on call should be paged    May a detailed message be left on voicemail?     Date of last office visit: 11/22/21    Message routed to: [facility] RN Pool

## 2022-02-18 ENCOUNTER — TELEPHONE (OUTPATIENT)
Dept: NEUROLOGY | Facility: CLINIC | Age: 37
End: 2022-02-18
Payer: COMMERCIAL

## 2022-02-18 NOTE — TELEPHONE ENCOUNTER
Patient was on a call with a provider last week.  He did not have an aura, but he blanked out during the call.  Patient was told that he did not respond for perhaps 4-5 minutes. He was told he was just sitting there not responding    Patient reports he had missed some medication doses in the week prior to the event, related to the death of an in-law and the change in routine that caused.  No missed meds since then.    Patient reports this was the first seizure for a while (since he was in residential treatment), and he has had none since    He has had some headache on and off for the past week.    We reviewed scheduled follow up.  I recommended he reach out to the headache team in case they have additional advice for his headaches.    Will update

## 2022-02-18 NOTE — TELEPHONE ENCOUNTER
What is the concern that needs to be addressed by a nurse? Patient had a seizure while on video visit with a different provider and they advised him to report it to his neurologist. He did also mention having on going headaches.    May a detailed message be left on voicemail? Yes    Date of last office visit: 11/22/21    Message routed to: [facility] RN pool

## 2022-02-18 NOTE — TELEPHONE ENCOUNTER
"Er   \"  Lesly Snyder MD  You 6 minutes ago (2:38 PM)     SP    I can see him in clinic.  We could do a video visit if it is easier.  He has psychogenic spells thoughts that are to me if this is epileptic or not.    Message text      \"    Chart routed to scheduling to arrange a follow up video visit  "

## 2022-02-22 DIAGNOSIS — G43.709 CHRONIC MIGRAINE WITHOUT AURA WITHOUT STATUS MIGRAINOSUS, NOT INTRACTABLE: Primary | ICD-10-CM

## 2022-02-22 RX ORDER — METHYLPREDNISOLONE 4 MG
TABLET, DOSE PACK ORAL
Qty: 21 TABLET | Refills: 0 | Status: SHIPPED | OUTPATIENT
Start: 2022-02-22 | End: 2023-01-27

## 2022-04-08 ENCOUNTER — VIRTUAL VISIT (OUTPATIENT)
Dept: NEUROLOGY | Facility: CLINIC | Age: 37
End: 2022-04-08
Attending: PSYCHIATRY & NEUROLOGY
Payer: COMMERCIAL

## 2022-04-08 DIAGNOSIS — R56.9 SEIZURE (H): ICD-10-CM

## 2022-04-08 RX ORDER — CHOLECALCIFEROL (VITAMIN D3) 50 MCG
1 TABLET ORAL DAILY
COMMUNITY

## 2022-04-08 RX ORDER — DIAZEPAM ORAL SOLUTION (CONCENTRATE) 5 MG/ML
SOLUTION ORAL
Qty: 30 ML | Refills: 0 | Status: CANCELLED | OUTPATIENT
Start: 2022-04-08

## 2022-04-08 NOTE — TELEPHONE ENCOUNTER
Refill of DZP rescue med requested during video appointment today.  Pended and routed to  for review

## 2022-04-08 NOTE — LETTER
"2022     RE: Javi Brown  : 1985   MRN: [id number]      Dear Colleague,    Thank you for referring your patient, Javi Brown, to the [facility] at [facility]. Please see a copy of my visit note below.    Video call outreach to patient.  Length of call 26mins 25 seconds, connected through EMR.    Javi Brown has a video visit today at the request of Lesly Snyder Ordering Provider for \"[facility] Patient Education Referral [[id number]\"    This service provided today was under the supervising provider of the day Mile Tong MD, who was available if needed.    Medications reviewed, med list updated.  MARTINEZ -200-100  -0-100  Patient feels his compliance has been good recently.  He continues as part of the [facility] program, he was residential for a while, and has been diagnosed with an eating disorder.  Currently he is in sessions for three hours per day, four days per week.  He has developed routines, and good eating habits, but feels that sometimes now when he gets stressed he will over exercise to the point he will ache for many days afterwards.    Patient does not use a medication reminder box  I explained the advantages of using a box, and he said he would look online, or at a local store for a box that would work with his medications.    Patient believes his last convulsive seizure was while he was at the residential program (2021) and that he has had some \"dreamy check-out\" spells, where he can only communicate by blinking, last in 2022.    He requests an updated prescription for diazepam rescue med, as he was told by the staff at the program the order had .    Will create and order/refill encounter    Follow up reviewed.  Date Time Provider Department   2022 11:45 AM Lesly Snyder MD [facility] video call   2022  3:00 PM Swati Hernandez MD [facility] in-person     No other questions at this " time  Patient was instructed to call if questions or concerns arise    Again, thank you for allowing me to participate in the care of your patient.      Sincerely,    MINANNALISE Nurse 1

## 2022-04-08 NOTE — PROGRESS NOTES
"Video call outreach to patient.  Length of call 26mins 25 seconds, connected through EMR.    Javi Brown has a video visit today at the request of Lesly Snyder Ordering Provider for \"[facility] Patient Education Referral [[id number]\"    This service provided today was under the supervising provider of the day Mile Tong MD, who was available if needed.    Medications reviewed, med list updated.  MARTINEZ -200-100  -0-100  Patient feels his compliance has been good recently.  He continues as part of the [employer] program, he was residential for a while, and has been diagnosed with an eating disorder.  Currently he is in sessions for three hours per day, four days per week.  He has developed routines, and good eating habits, but feels that sometimes now when he gets stressed he will over exercise to the point he will ache for many days afterwards.    Patient does not use a medication reminder box  I explained the advantages of using a box, and he said he would look online, or at a local store for a box that would work with his medications.    Patient believes his last convulsive seizure was while he was at the residential program (2021) and that he has had some \"dreamy check-out\" spells, where he can only communicate by blinking, last in 2022.    He requests an updated prescription for diazepam rescue med, as he was told by the staff at the program the order had .  Will create and order/refill encounter    Follow up reviewed.  Date Time Provider Department   2022 11:45 AM Lesly Snyder MD [facility] video call   2022  3:00 PM Swati Hernandez MD [facility] in-person       No other questions at this time  Patient was instructed to call if questions or concerns arise  "

## 2022-04-11 RX ORDER — DIAZEPAM ORAL SOLUTION (CONCENTRATE) 5 MG/ML
SOLUTION ORAL
Qty: 30 ML | Refills: 0 | Status: SHIPPED | OUTPATIENT
Start: 2022-04-11 | End: 2023-02-03

## 2022-04-19 ENCOUNTER — VIRTUAL VISIT (OUTPATIENT)
Dept: NEUROLOGY | Facility: CLINIC | Age: 37
End: 2022-04-19
Payer: COMMERCIAL

## 2022-04-19 DIAGNOSIS — G43.709 CHRONIC MIGRAINE WITHOUT AURA WITHOUT STATUS MIGRAINOSUS, NOT INTRACTABLE: ICD-10-CM

## 2022-04-19 DIAGNOSIS — R56.9 SEIZURE (H): ICD-10-CM

## 2022-04-19 RX ORDER — LAMOTRIGINE 200 MG/1
TABLET ORAL
Qty: 180 TABLET | Refills: 3 | Status: SHIPPED | OUTPATIENT
Start: 2022-04-19 | End: 2022-12-13

## 2022-04-19 RX ORDER — LAMOTRIGINE 100 MG/1
TABLET, ORALLY DISINTEGRATING ORAL
Qty: 180 TABLET | Refills: 11 | Status: SHIPPED | OUTPATIENT
Start: 2022-04-19 | End: 2022-12-13

## 2022-04-19 RX ORDER — TOPIRAMATE 100 MG/1
100 TABLET, FILM COATED ORAL 2 TIMES DAILY
Qty: 180 TABLET | Refills: 3 | Status: SHIPPED | OUTPATIENT
Start: 2022-04-19 | End: 2023-03-14
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
## 2022-04-19 NOTE — PATIENT INSTRUCTIONS
Plan :   Increase lamotrigine Week 1-2: take 300 mg morning, 250 mg noon, and 100 mg evening. Week 3-onward: 300 mg morning, 300 mg noon, and 100 mg evening (take along with lamotrigine tablet). I sent lamotrigine 100 mg tablet and 200 mg tablets.     Continue topiramate 100 mg twice a day     Diazepam: Give 1 ml (5mg): Give 1 ml for seizures greater 5 minutes or more than 2 seizures within an 8 hour period. May repeat once 1 ml (5mg). Monitor breathing, if there any concerns call 911. Do not exceed 10 mg per day.     Follow up  8 months    Follow up with headache clinic     Emotional health follow up  Per braulio Snyder MD

## 2022-05-06 ENCOUNTER — OFFICE VISIT (OUTPATIENT)
Dept: NEUROLOGY | Facility: CLINIC | Age: 37
End: 2022-05-06
Payer: COMMERCIAL

## 2022-05-06 VITALS
HEART RATE: 63 BPM | OXYGEN SATURATION: 96 % | SYSTOLIC BLOOD PRESSURE: 116 MMHG | BODY MASS INDEX: 35.44 KG/M2 | DIASTOLIC BLOOD PRESSURE: 82 MMHG | RESPIRATION RATE: 16 BRPM | WEIGHT: 247 LBS

## 2022-05-06 DIAGNOSIS — G43.709 CHRONIC MIGRAINE WITHOUT AURA WITHOUT STATUS MIGRAINOSUS, NOT INTRACTABLE: Primary | ICD-10-CM

## 2022-05-06 PROCEDURE — 99214 OFFICE O/P EST MOD 30 MIN: CPT | Performed by: PSYCHIATRY & NEUROLOGY

## 2022-05-06 ASSESSMENT — PAIN SCALES - GENERAL: PAINLEVEL: SEVERE PAIN (6)

## 2022-05-06 NOTE — NURSING NOTE
Chief Complaint   Patient presents with     RECHECK     RETURN HEADACHE - 6 mo f/u     Kevan Hernández

## 2022-05-06 NOTE — LETTER
"5/6/2022       RE: Javi Brown  [address] [place] [address]     Dear Colleague,    Thank you for referring your patient, Javi Brown, to the [facility] NEUROLOGY CLINIC [place] at [facility]. Please see a copy of my visit note below.    [facility]  Neurology Progress Note    Subjective:    Mr. Brown returns for follow-up of chronic migraine in the setting of epileptic and nonepileptic seizures.    Today, he reports that he is participating in the [facility]; he is in his second week back. He's participated in the program before. He describes doing \"two a days\" and not eating, leading to enrollment in the program.    He stopped caffeine abruptly.  He previously drank Energy drinks. He developed a terrible headache, rating 9/10 initially, now down to 5-6/10. The last week has been bad.     He describes about 15/30 headache days per month, with 8/30 severe headache days per month.     He used an Imitrex subcutaneous injection, which was helpful, but the headache gradually returned as it wore off. He also has Excedrin (2 at a time) and Tylenol (2 at a time); he avoids other NSAIDs.    He most recently has a seizure in March; he had a prolonged headache afterward. Medrol dosepak was given, which was maybe a little helpful, he reports.     Objective:    Vitals: /82   Pulse 63   Resp 16   Wt 112 kg (247 lb)   SpO2 96%   BMI 35.44 kg/m    General: Cooperative, NAD  Neurologic:  Mental Status: Fully alert, attentive and oriented. Speech clear and fluent.   Cranial Nerves: Facial movements symmetric.   Motor: No abnormal movements.      Pertinent Investigations:    MRI brain w and wo contrast (8/17/2021):  1. A few scattered tiny nonspecific white matter lesions.  2. No evidence for intracranial hemorrhage, acute infarct, or any  focal mass lesions.  3. Exam mildly " limited by motion artifact.    Assessment/Plan:   Javi Brown is a 36-year-old man with epileptic and nonepileptic seizure, and chronic migraine who returns for follow-up.    He has had worsening headaches in the setting of abrupt caffeine withdrawal.    He previously had a good response to topiramate 100 mg BID.  Over the last few months, he has had increasing number of headaches, now having 15 headache days a month.  We discussed adding another preventative therapy, botulinum toxin injections using a chronic migraine protocol or a CGRP monoclonal antibody.  After discussion, he would prefer to start a CGRP inhibitor.  -I recommend a trial of Emgality subcutaneous injection every 28 days, starting with a loading dose.  I recommend a 3 to 6-month trial prior to determining effectiveness.  Potential side effects were discussed.  We will attempt to get preauthorization.  He prefers to complete the injections via a nurse visit.  -For acute treatment, sumatriptan remains effective.    Would avoid beta-blockers given depression history. Would also avoid TCA's as they are known to lower the seizure threshold and would be concerned for excess side-effects w/ his concurrent medications.     If his headaches worsen or he were to develop new neurologic features, I've asked him to contact me; an updated MRI could be considered.    I will plan to see him back in 6 months to monitor his progress.    Swati Hernandez MD  Neurology

## 2022-05-06 NOTE — PROGRESS NOTES
"[facility]  Neurology Progress Note    Subjective:    Mr. Brown returns for follow-up of chronic migraine in the setting of epileptic and nonepileptic seizures.    Today, he reports that he is participating in the [facility]; he is in his second week back. He's participated in the program before. He describes doing \"two a days\" and not eating, leading to enrollment in the program.    He stopped caffeine abruptly.  He previously drank Energy drinks. He developed a terrible headache, rating 9/10 initially, now down to 5-6/10. The last week has been bad.     He describes about 15/30 headache days per month, with 8/30 severe headache days per month.     He used an Imitrex subcutaneous injection, which was helpful, but the headache gradually returned as it wore off. He also has Excedrin (2 at a time) and Tylenol (2 at a time); he avoids other NSAIDs.    He most recently has a seizure in March; he had a prolonged headache afterward. Medrol dosepak was given, which was maybe a little helpful, he reports.     Objective:    Vitals: /82   Pulse 63   Resp 16   Wt 112 kg (247 lb)   SpO2 96%   BMI 35.44 kg/m    General: Cooperative, NAD  Neurologic:  Mental Status: Fully alert, attentive and oriented. Speech clear and fluent.   Cranial Nerves: Facial movements symmetric.   Motor: No abnormal movements.      Pertinent Investigations:    MRI brain w and wo contrast (8/17/2021):  1. A few scattered tiny nonspecific white matter lesions.  2. No evidence for intracranial hemorrhage, acute infarct, or any  focal mass lesions.  3. Exam mildly limited by motion artifact.    Assessment/Plan:   Javi Brown is a 36-year-old man with epileptic and nonepileptic seizure, and chronic migraine who returns for follow-up.    He has had worsening headaches in the setting of abrupt caffeine withdrawal.    He previously had a good response to topiramate 100 mg BID.  " Over the last few months, he has had increasing number of headaches, now having 15 headache days a month.  We discussed adding another preventative therapy, botulinum toxin injections using a chronic migraine protocol or a CGRP monoclonal antibody.  After discussion, he would prefer to start a CGRP inhibitor.  -I recommend a trial of Emgality subcutaneous injection every 28 days, starting with a loading dose.  I recommend a 3 to 6-month trial prior to determining effectiveness.  Potential side effects were discussed.  We will attempt to get preauthorization.  He prefers to complete the injections via a nurse visit.  -For acute treatment, sumatriptan remains effective.    Would avoid beta-blockers given depression history. Would also avoid TCA's as they are known to lower the seizure threshold and would be concerned for excess side-effects w/ his concurrent medications.     If his headaches worsen or he were to develop new neurologic features, I've asked him to contact me; an updated MRI could be considered.    I will plan to see him back in 6 months to monitor his progress.    Swati Hernandez MD  Neurology

## 2022-05-08 ENCOUNTER — TELEPHONE (OUTPATIENT)
Dept: NEUROLOGY | Facility: CLINIC | Age: 37
End: 2022-05-08
Payer: COMMERCIAL

## 2022-05-09 NOTE — TELEPHONE ENCOUNTER
Prior Authorization Retail Medication Request    Medication/Dose: galcanezumab-gnlm (EMGALITY) 120 MG/ML injection; Inject 1 mL (120 mg) Subcutaneous every 28 days; Inject 2 mLs (240 mg) Subcutaneous once for 1 dose  ICD code (if different than what is on RX):      Previously Tried and Failed:  He previously had a good response to topiramate 100 mg BID.  Over the last few months, he has had increasing number of headaches, now having 15 headache days a month    Would avoid beta-blockers given depression history. Would also avoid TCA's as they are known to lower the seizure threshold and would be concerned for excess side-effects w/ his concurrent medications.   Rationale:  He describes about 15/30 headache days per month, with 8/30 severe headache days per month.        Insurance Name:  preferredone  Insurance ID:  [id number]      Pharmacy Information (if different than what is on RX)  Name:    Phone:

## 2022-05-11 NOTE — TELEPHONE ENCOUNTER
Central Prior Authorization Team   Phone: [phone]      PA Initiation    Medication: galcanezumab-gnlm (EMGALITY) 120 MG/ML injection  Insurance Company: [employer] - Phone [phone] Fax [phone]  Pharmacy Filling the Rx: [facility] [place], [place] - [address] [place] [address] [phone]  Filling Pharmacy Phone: [phone]  Filling Pharmacy Fax:    Start Date: 5/11/2022

## 2022-05-12 NOTE — TELEPHONE ENCOUNTER
Prior Authorization Not Needed per Insurance    Medication: galcanezumab-gnlm (EMGALITY) 120 MG/ML injection-PA Not Needed  Insurance Company: [employer] - Phone [phone] Fax [phone]  Expected CoPay:      Pharmacy Filling the Rx: [facility] [place], [place] - [address] [facility] [phone]  Pharmacy Notified: No  Patient Notified: No        Pharmacy was able to get a paid claim.

## 2022-05-13 ENCOUNTER — ALLIED HEALTH/NURSE VISIT (OUTPATIENT)
Dept: NEUROLOGY | Facility: CLINIC | Age: 37
End: 2022-05-13
Payer: COMMERCIAL

## 2022-05-13 DIAGNOSIS — G43.909 MIGRAINE: Primary | ICD-10-CM

## 2022-05-13 PROCEDURE — 99207 PR NO CHARGE NURSE ONLY: CPT

## 2022-05-13 NOTE — NURSING NOTE
Javijennifer Brown comes into clinic today at the request of Dr. Hernandez Ordering Provider for Med Injection only Emgality and Pt Teaching Emgality administration.    Patient came to clinic today for help with administration of his loading dose of Emgality. He brought his two Emgality auto-injection pens. We reviewed how the pens worked and demonstrated with our practice pen. Pt verbalized comfort with how to use the pen. I assisted in administering emgality injections in left and right thigh. Pt tolerated well.     He stated next month he maybe living back in [place]. He is currently staying at [facility]. We discussed if he does go back to [place] we can transfer prescription to his local pharmacy. He feels comfortable to administer next dose on his own. I let him know he can message us if he has any questions/concerns.     This service provided today was under the supervising provider of the day, who was available if needed.    Sulema Maya RN

## 2022-05-21 ENCOUNTER — HEALTH MAINTENANCE LETTER (OUTPATIENT)
Age: 37
End: 2022-05-21

## 2022-09-18 ENCOUNTER — HEALTH MAINTENANCE LETTER (OUTPATIENT)
Age: 37
End: 2022-09-18

## 2022-11-07 ENCOUNTER — TELEPHONE (OUTPATIENT)
Dept: NEUROLOGY | Facility: CLINIC | Age: 37
End: 2022-11-07

## 2022-11-07 ENCOUNTER — OFFICE VISIT (OUTPATIENT)
Dept: NEUROLOGY | Facility: CLINIC | Age: 37
End: 2022-11-07
Payer: COMMERCIAL

## 2022-11-07 VITALS
RESPIRATION RATE: 16 BRPM | OXYGEN SATURATION: 98 % | HEART RATE: 51 BPM | SYSTOLIC BLOOD PRESSURE: 117 MMHG | DIASTOLIC BLOOD PRESSURE: 81 MMHG

## 2022-11-07 DIAGNOSIS — G43.709 CHRONIC MIGRAINE WITHOUT AURA WITHOUT STATUS MIGRAINOSUS, NOT INTRACTABLE: Primary | ICD-10-CM

## 2022-11-07 PROCEDURE — 99214 OFFICE O/P EST MOD 30 MIN: CPT | Performed by: PSYCHIATRY & NEUROLOGY

## 2022-11-07 RX ORDER — ONDANSETRON 4 MG/1
4 TABLET, ORALLY DISINTEGRATING ORAL EVERY 8 HOURS PRN
Qty: 18 TABLET | Refills: 11 | Status: SHIPPED | OUTPATIENT
Start: 2022-11-07 | End: 2024-05-06

## 2022-11-07 RX ORDER — CEFUROXIME AXETIL 250 MG/1
6 TABLET ORAL
Qty: 9 KIT | Refills: 11 | Status: ON HOLD | OUTPATIENT
Start: 2022-11-07 | End: 2024-04-19

## 2022-11-07 ASSESSMENT — PAIN SCALES - GENERAL: PAINLEVEL: MODERATE PAIN (5)

## 2022-11-07 NOTE — PROGRESS NOTES
[facility]    Headache Neurology Progress Note  November 7, 2022    Subjective:    Mr. Brown returns for follow-up of chronic migraine in the setting of epileptic and nonepileptic seizures.    Today, he reports that his headaches have seemed to be worsening over the past few months. He is currently experiencing a migraine that began this past Friday (day 4). He is having around 15-20 headache days per month which are all severe (8/10 pain), which is significantly worse since he was seen last in May of this year. He experiences phonophobia, nausea, and irritability with his headaches. These migraines usually last 2.5-3 days.      He currently is taking topiramate 100 mg BID for seizure prophylaxis. He was seen in the ED on 11/2/22 for a breakthrough seizure c/b missing some doses of medication. He also takes sumatriptan 6mg injectable for migraine rescue. Last time he was seen, he had been started on Emgality 120mg injections. He received his loading dose and administered one additional dose (2 months of adherence) before missing after becoming very busy with his wedding. He thinks he may have noticed a small improvement during this time.    Objective:    Vitals: /81   Pulse 51   Resp 16   SpO2 98%   General: Cooperative, NAD  Neurologic:  Mental Status: Fully alert, attentive and oriented. Speech clear and fluent.   Cranial Nerves: Facial movements symmetric.   Motor: No abnormal movements.      Pertinent Investigations:    MRI brain w and wo contrast (8/17/2021):  1. A few scattered tiny nonspecific white matter lesions.  2. No evidence for intracranial hemorrhage, acute infarct, or any  focal mass lesions.  3. Exam mildly limited by motion artifact.    Assessment/Plan:   Javi Brown is a 37-year-old man with epileptic and nonepileptic seizures, and chronic migraine who returns for follow-up. He has had worsening headaches since last seen, though was not able to  continue using Emgality at that time.    Today, he is interested in restarting Emgality as a preventative treatment. As he is currently experiencing migraines more than half of the time, improving his migraine prevention is priority today.    -I recommend restarting a trial of Emgality subcutaneous injection every 28 days, starting with a loading dose. An additional script was written for a repeat loading dose. We discussed that if not covered by insurance, that we are okay with him starting with one dose and continuing every 28 days like normal.  -Would like to see him back in 6 months to determinine effectiveness of new treatment.  -For acute treatment, injectable sumatriptan remains effective.    Would avoid beta-blockers given depression history. Would also avoid TCA's as they are known to lower the seizure threshold and would be concerned for excess side-effects w/ his concurrent medications.      If his headaches worsen or he were to develop new neurologic features, I've asked him to contact me; an updated MRI could be considered.     I will plan to see him back in 6 months to monitor his progress.    Quentin Smallwood, MS4      Physician Attestation   I, Swati Hernandez MD, saw this patient and agree with the findings and plan of care as documented in the note.      He continues to have frequent severe headaches, warranting prevention.  He was not able to complete a trial of Emgality.  We discussed retrialing this today for 3 to 6 months.  I provided a new prescription for him.  -For acute treatment, injectable sumatriptan is effective.  I provided refills for this.  For nausea, I recommend ondansetron 4 mg oral dissolvable tablet as needed.    Swati Hernandez MD  Neurology

## 2022-11-07 NOTE — LETTER
11/7/2022       RE: Javi Brown  [address] [place] [address]     Dear Colleague,    Thank you for referring your patient, Javi Brown, to the [facility] [place] [facility]. Please see a copy of my visit note below.    [facility]    Headache Neurology Progress Note  November 7, 2022    Subjective:    Mr. Brown returns for follow-up of chronic migraine in the setting of epileptic and nonepileptic seizures.    Today, he reports that his headaches have seemed to be worsening over the past few months. He is currently experiencing a migraine that began this past Friday (day 4). He is having around 15-20 headache days per month which are all severe (8/10 pain), which is significantly worse since he was seen last in May of this year. He experiences phonophobia, nausea, and irritability with his headaches. These migraines usually last 2.5-3 days.      He currently is taking topiramate 100 mg BID for seizure prophylaxis. He was seen in the ED on 11/2/22 for a breakthrough seizure c/b missing some doses of medication. He also takes sumatriptan 6mg injectable for migraine rescue. Last time he was seen, he had been started on Emgality 120mg injections. He received his loading dose and administered one additional dose (2 months of adherence) before missing after becoming very busy with his wedding. He thinks he may have noticed a small improvement during this time.    Objective:    Vitals: /81   Pulse 51   Resp 16   SpO2 98%   General: Cooperative, NAD  Neurologic:  Mental Status: Fully alert, attentive and oriented. Speech clear and fluent.   Cranial Nerves: Facial movements symmetric.   Motor: No abnormal movements.      Pertinent Investigations:    MRI brain w and wo contrast (8/17/2021):  1. A few scattered tiny nonspecific white matter lesions.  2. No evidence for intracranial  hemorrhage, acute infarct, or any  focal mass lesions.  3. Exam mildly limited by motion artifact.    Assessment/Plan:   Javi Brown is a 37-year-old man with epileptic and nonepileptic seizures, and chronic migraine who returns for follow-up. He has had worsening headaches since last seen, though was not able to continue using Emgality at that time.    Today, he is interested in restarting Emgality as a preventative treatment. As he is currently experiencing migraines more than half of the time, improving his migraine prevention is priority today.    -I recommend restarting a trial of Emgality subcutaneous injection every 28 days, starting with a loading dose. An additional script was written for a repeat loading dose. We discussed that if not covered by insurance, that we are okay with him starting with one dose and continuing every 28 days like normal.  -Would like to see him back in 6 months to determinine effectiveness of new treatment.  -For acute treatment, injectable sumatriptan remains effective.    Would avoid beta-blockers given depression history. Would also avoid TCA's as they are known to lower the seizure threshold and would be concerned for excess side-effects w/ his concurrent medications.      If his headaches worsen or he were to develop new neurologic features, I've asked him to contact me; an updated MRI could be considered.     I will plan to see him back in 6 months to monitor his progress.    Quentin Smallwood, MS4      Physician Attestation   I, Swati Hernandez MD, saw this patient and agree with the findings and plan of care as documented in the note.      He continues to have frequent severe headaches, warranting prevention.  He was not able to complete a trial of Emgality.  We discussed retrialing this today for 3 to 6 months.  I provided a new prescription for him.  -For acute treatment, injectable sumatriptan is effective.  I provided refills for this.  For nausea, I recommend  ondansetron 4 mg oral dissolvable tablet as needed.       Again, thank you for allowing me to participate in the care of your patient.      Sincerely,    Swati Hernandez MD

## 2022-11-07 NOTE — TELEPHONE ENCOUNTER
Prior Authorization Retail Medication Request    Medication/Dose: Emgality 240 mg loading dose then 120 mg every 28 days  ICD code (if different than what is on RX):  Chronic migraine  Previously Tried and Failed:  Would avoid beta-blockers given depression history. Would also avoid TCA's as they are known to lower the seizure threshold and would be concerned for excess side-effects w/ his concurrent medications.       Rationale:  15-20 headache days per month which are all severe (8/10 pain), which is significantly worse since he was seen last in May of this year. He experiences phonophobia, nausea, and irritability with his headaches. These migraines usually last 2.5-3 days.      Insurance Name:  [employer]erred[employer]  Insurance ID:  [id number]       Pharmacy Information (if different than what is on RX)  Name:    Phone:

## 2022-11-07 NOTE — NURSING NOTE
Chief Complaint   Patient presents with     RECHECK     RETURN HEADACHE - 6 mo. F/u     Kevan Hernández

## 2022-11-09 NOTE — TELEPHONE ENCOUNTER
Prior Authorization Not Needed per Insurance    Medication: galcanezumab-gnlm (EMGALITY) 120 MG/ML injection--NO PA NEEDED  Insurance Company: [employer] - Phone [phone] Fax [phone]  Expected CoPay:      Pharmacy Filling the Rx: [facility] - [address]  Pharmacy Notified: Yes  Patient Notified: Yes **Instructed pharmacy to notify patient when script is ready to /ship.**

## 2022-12-13 ENCOUNTER — VIRTUAL VISIT (OUTPATIENT)
Dept: NEUROLOGY | Facility: CLINIC | Age: 37
End: 2022-12-13
Payer: COMMERCIAL

## 2022-12-13 VITALS — BODY MASS INDEX: 36.4 KG/M2 | WEIGHT: 260 LBS | HEIGHT: 71 IN

## 2022-12-13 DIAGNOSIS — R56.9 SEIZURE (H): ICD-10-CM

## 2022-12-13 RX ORDER — LAMOTRIGINE 100 MG/1
TABLET, ORALLY DISINTEGRATING ORAL
Qty: 180 TABLET | Refills: 11 | Status: SHIPPED | OUTPATIENT
Start: 2022-12-13 | End: 2023-03-14

## 2022-12-13 ASSESSMENT — PAIN SCALES - GENERAL: PAINLEVEL: NO PAIN (0)

## 2022-12-13 ASSESSMENT — PATIENT HEALTH QUESTIONNAIRE - PHQ9: SUM OF ALL RESPONSES TO PHQ QUESTIONS 1-9: 2
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
## 2022-12-13 NOTE — PATIENT INSTRUCTIONS
Increase lamotrigine Week 1-2: lamotrigine 250 mg morning and 200 mg evening. Week 3-4: 300 mg morning and 250 mg evening. Week 5-onward: 300 mg morning and 300 mg evening.     We can not use lamotrigine XR due to his need for ODT (gastric surgery and gastric pouch)    Continue topiramate 100 mg twice a day     Diazepam: Give 1 ml (5mg): Give 1 ml for seizures greater 5 minutes or more than 2 seizures within an 8 hour period. May repeat once 1 ml (5mg). Monitor breathing, if there any concerns call 911. Do not exceed 10 mg per day.     Follow up  4 months    Emotional health follow up    No driving     Lesly Snyder MD
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
## 2023-01-27 ENCOUNTER — HOSPITAL ENCOUNTER (EMERGENCY)
Facility: CLINIC | Age: 38
Discharge: HOME | End: 2023-01-28
Attending: EMERGENCY MEDICINE | Admitting: EMERGENCY MEDICINE
Payer: COMMERCIAL

## 2023-01-27 DIAGNOSIS — H05.011 CELLULITIS OF RIGHT ORBITAL REGION: ICD-10-CM

## 2023-01-27 DIAGNOSIS — G40.909 SEIZURE DISORDER (H): ICD-10-CM

## 2023-01-27 PROCEDURE — 99283 EMERGENCY DEPT VISIT LOW MDM: CPT

## 2023-01-27 PROCEDURE — 99283 EMERGENCY DEPT VISIT LOW MDM: CPT | Performed by: EMERGENCY MEDICINE

## 2023-01-28 VITALS
HEART RATE: 61 BPM | OXYGEN SATURATION: 96 % | RESPIRATION RATE: 16 BRPM | TEMPERATURE: 97.5 F | SYSTOLIC BLOOD PRESSURE: 123 MMHG | DIASTOLIC BLOOD PRESSURE: 75 MMHG

## 2023-01-28 LAB
ANION GAP SERPL CALCULATED.3IONS-SCNC: 15 MMOL/L (ref 7–15)
BASOPHILS # BLD AUTO: 0.1 10E3/UL (ref 0–0.2)
BASOPHILS NFR BLD AUTO: 1 %
BUN SERPL-MCNC: 12 MG/DL (ref 6–20)
CALCIUM SERPL-MCNC: 9 MG/DL (ref 8.6–10)
CHLORIDE SERPL-SCNC: 106 MMOL/L (ref 98–107)
CREAT SERPL-MCNC: 0.79 MG/DL (ref 0.67–1.17)
DEPRECATED HCO3 PLAS-SCNC: 22 MMOL/L (ref 22–29)
EOSINOPHIL # BLD AUTO: 0.2 10E3/UL (ref 0–0.7)
EOSINOPHIL NFR BLD AUTO: 2 %
ERYTHROCYTE [DISTWIDTH] IN BLOOD BY AUTOMATED COUNT: 13.7 % (ref 10–15)
GFR SERPL CREATININE-BSD FRML MDRD: >90 ML/MIN/1.73M2
GLUCOSE SERPL-MCNC: 102 MG/DL (ref 70–99)
HCT VFR BLD AUTO: 41.1 % (ref 40–53)
HGB BLD-MCNC: 13.6 G/DL (ref 13.3–17.7)
IMM GRANULOCYTES # BLD: 0 10E3/UL
IMM GRANULOCYTES NFR BLD: 0 %
LYMPHOCYTES # BLD AUTO: 1.9 10E3/UL (ref 0.8–5.3)
LYMPHOCYTES NFR BLD AUTO: 25 %
MCH RBC QN AUTO: 31.1 PG (ref 26.5–33)
MCHC RBC AUTO-ENTMCNC: 33.1 G/DL (ref 31.5–36.5)
MCV RBC AUTO: 94 FL (ref 78–100)
MONOCYTES # BLD AUTO: 0.6 10E3/UL (ref 0–1.3)
MONOCYTES NFR BLD AUTO: 8 %
NEUTROPHILS # BLD AUTO: 4.7 10E3/UL (ref 1.6–8.3)
NEUTROPHILS NFR BLD AUTO: 64 %
NRBC # BLD AUTO: 0 10E3/UL
NRBC BLD AUTO-RTO: 0 /100
PLATELET # BLD AUTO: 199 10E3/UL (ref 150–450)
POTASSIUM SERPL-SCNC: 3.7 MMOL/L (ref 3.4–5.3)
RBC # BLD AUTO: 4.38 10E6/UL (ref 4.4–5.9)
SODIUM SERPL-SCNC: 143 MMOL/L (ref 136–145)
WBC # BLD AUTO: 7.5 10E3/UL (ref 4–11)

## 2023-01-28 PROCEDURE — 80201 ASSAY OF TOPIRAMATE: CPT | Performed by: EMERGENCY MEDICINE

## 2023-01-28 PROCEDURE — 85025 COMPLETE CBC W/AUTO DIFF WBC: CPT | Performed by: EMERGENCY MEDICINE

## 2023-01-28 PROCEDURE — 80175 DRUG SCREEN QUAN LAMOTRIGINE: CPT | Performed by: EMERGENCY MEDICINE

## 2023-01-28 PROCEDURE — 80048 BASIC METABOLIC PNL TOTAL CA: CPT | Performed by: EMERGENCY MEDICINE

## 2023-01-28 PROCEDURE — 250N000013 HC RX MED GY IP 250 OP 250 PS 637: Performed by: EMERGENCY MEDICINE

## 2023-01-28 PROCEDURE — 36415 COLL VENOUS BLD VENIPUNCTURE: CPT | Performed by: EMERGENCY MEDICINE

## 2023-01-28 RX ORDER — LAMOTRIGINE 25 MG/1
50 TABLET ORAL DAILY
Status: DISCONTINUED | OUTPATIENT
Start: 2023-01-28 | End: 2023-01-28 | Stop reason: HOSPADM

## 2023-01-28 RX ORDER — TOPIRAMATE SPINKLE 25 MG/1
100 CAPSULE ORAL ONCE
Status: COMPLETED | OUTPATIENT
Start: 2023-01-28 | End: 2023-01-28

## 2023-01-28 RX ADMIN — LAMOTRIGINE 50 MG: 25 TABLET ORAL at 02:19

## 2023-01-28 RX ADMIN — TOPIRAMATE 100 MG: 25 CAPSULE, COATED PELLETS ORAL at 01:59

## 2023-01-28 ASSESSMENT — ACTIVITIES OF DAILY LIVING (ADL)
ADLS_ACUITY_SCORE: 35
ADLS_ACUITY_SCORE: 35

## 2023-01-28 NOTE — ED PROVIDER NOTES
"      [place] EMERGENCY DEPARTMENT ([facility])  January 27, 2023     History   No chief complaint on file.    HPI  Javi Brown is a 37 year old male with a past medical history including epilepsy, eating disorder who presents to the Emergency Department for evaluation of seizures. Patient reports while driving around today he had an aura, so he pulled over in an area that he knew. He then had a witnessed seizure. He reports that he has not been compliant with his Lamictal or Topamax. States that he last took his medications 2 weeks ago. States that when not on his meds he has x1 seizure per month, when on his meds he has seizures \"hardly ever\". No other drug or substance use today.    Per EMS, the patient was found having a seizure in a parking lot in [place] by a bystander. The bystander then called EMS. By time of EMS arrival seizure-like activity had resolved. No witnessed seizure per EMS in route. Upon arrival to the ED patient is alert and oriented.    Per 1/23/23 [facility] note patient presented with headache, fatigue and generalized weakness. No electrolyte abnormalities, patient was treated w/IVF for dehydration and discharged home.    Past Medical History  Past Medical History:   Diagnosis Date     Gastric bypass status for obesity      No past surgical history on file.  acetaminophen 500 MG CAPS  busPIRone HCl (BUSPAR) 30 MG tablet  cyanocobalamin (VITAMIN B-12) 1000 MCG SUBL sublingual tablet  diazepam (DIAZEPAM INTENSOL) 5 MG/ML (HIGH CONC) solution  galcanezumab-gnlm (EMGALITY) 120 MG/ML injection  hydrOXYzine (VISTARIL) 25 MG capsule  lamoTRIgine (LAMICTAL) 100 MG TBDP ODT tab  ondansetron (ZOFRAN ODT) 4 MG ODT tab  ONDANSETRON PO  Pediatric Multi Vit-Extra C-FA (FLINTSTONES/EXTRA C) CHEW  sertraline (ZOLOFT) 100 MG tablet  SUMAtriptan (IMITREX STATDOSE) 6 MG/0.5ML pen injector kit  topiramate (TOPAMAX) 100 MG tablet  traZODone (DESYREL) 50 MG tablet  vitamin D3 (CHOLECALCIFEROL) 50 " mcg (2000 units) tablet      Allergies   Allergen Reactions     Benadryl [Diphenhydramine]      Reglan [Metoclopramide]      Droperidol Unknown, Anxiety and Other (See Comments)     No reaction specified       Family History  No family history on file.  Social History   Social History     Tobacco Use     Smoking status: Never     Smokeless tobacco: Never   Substance Use Topics     Alcohol use: Never     Drug use: Never      Past medical history, past surgical history, medications, allergies, family history, and social history were reviewed with the patient. No additional pertinent items.      A medically appropriate review of systems was performed with pertinent positives and negatives noted in the HPI, and all other systems negative.    Physical Exam      Physical Exam  Constitutional:       General: He is not in acute distress.     Appearance: He is well-developed. He is not ill-appearing, toxic-appearing or diaphoretic.   HENT:      Head: Normocephalic and atraumatic.      Mouth/Throat:      Comments: No tongue laceration  Cardiovascular:      Rate and Rhythm: Normal rate and regular rhythm.      Heart sounds: Normal heart sounds.   Pulmonary:      Effort: Pulmonary effort is normal. No respiratory distress.      Breath sounds: No wheezing.   Abdominal:      General: There is no distension.      Palpations: Abdomen is soft.      Tenderness: There is no abdominal tenderness. There is no rebound.   Musculoskeletal:      Cervical back: Normal range of motion and neck supple.   Skin:     General: Skin is warm.   Neurological:      General: No focal deficit present.      Mental Status: He is alert and oriented to person, place, and time.   Psychiatric:         Mood and Affect: Mood normal.         Behavior: Behavior normal.         Thought Content: Thought content normal.           ED Course, Procedures, & Data     11:46 PM  The patient was seen and examined by Radha Feldman MD in Room ED27.   Procedures           Per  Neurology Dr. Lesly Snyder note 12/22:  Plan :   Increase lamotrigine Week 1-2: lamotrigine 250 mg morning and 200 mg evening. Week 3-4: 300 mg morning and 250 mg evening. Week 5-onward: 300 mg morning and 300 mg evening.      We can not use lamotrigine XR due to his need for ODT (gastric surgery and gastric pouch)     Continue topiramate 100 mg twice a day      Diazepam: Give 1 ml (5mg): Give 1 ml for seizures greater 5 minutes or more than 2 seizures within an 8 hour period. May repeat once 1 ml (5mg). Monitor breathing, if there any concerns call 911. Do not exceed 10 mg per day.        No results found for this or any previous visit (from the past 24 hour(s)).      No results found for any visits on 01/27/23.  Medications - No data to display  Labs Ordered and Resulted from Time of ED Arrival to Time of ED Departure - No data to display  No orders to display          Medical Decision Making  The patient's presentation is strongly suggestive of a chronic illness severe exacerbation, progression, or side effect of treatment.    The patient's evaluation involved:  review of external note(s) from 3+ sources (see separate area of note for details)  ordering and/or review of 3+ test(s) in this encounter (see separate area of note for details)  review of 3+ test result(s) ordered prior to this encounter (see separate area of note for details)    The patient's management involved prescription drug management (including medications given in the ED).      Assessment & Plan    Is a 37-year-old male who presents to the ER due to a seizure.  Patient notes having a history of seizure and that he normally has a seizure once a month and he stopped taking his medications.  Patient says he does not take this medication the last 2 weeks.  Patient electrolytes are stable.  No signs of head injury.  Patient is back to his baseline.  Plan will be to restart his Topamax and his Lamictal.  The Lamictal will be started at 50 mg twice a  day to avoid any risks of side effects due to starting the medication.  Discussed the case with the neurology resident who agrees.  Patient is also discussed with the pharmacist who agrees with starting the low-dose Mictral at 50 mg twice a day.  Patient with no other acute concerns at this time.  No SI or HI.  Patient is calm and cooperative.  Total patient with a plan to discharge today and no other acute questions.    I have reviewed the nursing notes. I have reviewed the findings, diagnosis, plan and need for follow up with the patient.    New Prescriptions    No medications on file       Final diagnoses:   Seizure disorder (H)     ISanjuana, am serving as a trained medical scribe to document services personally performed by Radha Feldman MD, based on the provider's statements to me.   Radha SERRANO MD, was physically present and have reviewed and verified the accuracy of this note documented by Sanjuana Luis.    Radha Feldman MD  [facility] EMERGENCY DEPARTMENT  1/27/2023     Radha Feldman MD  01/28/23 0240

## 2023-01-28 NOTE — ED TRIAGE NOTES
Pt brought in per EMS for  seizure like activity. Pt was found in the parking lot in [place] per bystander. Bystander witnessed seizure like activity, called EMS. No witnessed seizure per EMS en route. Pt is A/O     Triage Assessment     Row Name 01/27/23 0472       Triage Assessment (Adult)    Airway WDL WDL       Respiratory WDL    Respiratory WDL WDL       Skin Circulation/Temperature WDL    Skin Circulation/Temperature WDL WDL       Cardiac WDL    Cardiac WDL WDL       Peripheral/Neurovascular WDL    Peripheral Neurovascular WDL WDL       Cognitive/Neuro/Behavioral WDL    Cognitive/Neuro/Behavioral WDL WDL

## 2023-01-28 NOTE — ED NOTES
Bed: ED27  Expected date:   Expected time:   Means of arrival:   Comments:  SPF  38 yo M  posttical

## 2023-01-28 NOTE — DISCHARGE INSTRUCTIONS
Your blood work is stable.    Please restart your lamictal at 50 mg twice a day for the next 1 week     Restart topamax at the normal dose.     Please follow up with your neurologist for further care.

## 2023-01-29 ENCOUNTER — TELEPHONE (OUTPATIENT)
Dept: EMERGENCY MEDICINE | Facility: CLINIC | Age: 38
End: 2023-01-29
Payer: COMMERCIAL

## 2023-01-29 LAB
LAMOTRIGINE SERPL-MCNC: <0.9 UG/ML
TOPIRAMATE SERPL-MCNC: <1.5 UG/ML

## 2023-01-29 NOTE — TELEPHONE ENCOUNTER
[facility] Emergency Department Lab result notification    [place] ED lab result protocol used  [facility] labs    Reason for call  Notify of lab results, assess symptoms,  review ED providers recommendations/discharge instructions (if necessary) and advise per ED lab result f/u protocol    Lab Result (including Rx patient on, if applicable)  Abnormal Result.  Final Lamotrigine level is <0.9 and this level is [low].  Normal reference range is 3.0-15.0 micrograms/mL  Resulted after [facility] Emergency Dept visit on this date 1/27/23.  [facility] patients, route result to PCP.   Non [facility] patients, RN to notify patient/parent of result and advise to relay result to their PCP immediately.  [ED lab result f/u RN may want to fax result to PCP].    Abnormal Result.  Final Topiramate level is <1.5 and this level is [low].  Normal reference range is 5.0-20.0 micrograms/mL  Resulted after [facility] Emergency Dept visit on this date 1/27/23.  [facility] patients, route result to PCP.   Non [facility] patients, RN to notify patient/parent of result and advise to relay result to their PCP immediately.  [ED lab result f/u RN may want to fax result to PCP].    RN Assessment (Patient s current Symptoms), include time called.  12:20 PM - no call necessary levels return low after patient reports non-compliance  Per ED provider - He reports that he has not been compliant with his Lamictal or Topamax. States that he last took his medications 2 weeks ago.    Aretha Pool RN  [facility] [place]  Emergency Dept Lab Result RN  Ph# [phone]     Copy of Lab result   Component      Latest Ref Rng & Units 1/28/2023   Topiramate Level      5.0 - 20.0 ug/mL <1.5 (L)   Lamotrigine      3.0 - 15.0 ug/mL <0.9 (L)

## 2023-02-01 ENCOUNTER — MYC MEDICAL ADVICE (OUTPATIENT)
Dept: NEUROLOGY | Facility: CLINIC | Age: 38
End: 2023-02-01

## 2023-02-01 DIAGNOSIS — R56.9 SEIZURE (H): Primary | ICD-10-CM

## 2023-02-02 DIAGNOSIS — R56.9 SEIZURE (H): ICD-10-CM

## 2023-02-02 NOTE — TELEPHONE ENCOUNTER
Patient has some concerns about some symptoms that he is having, that has never happened before. Patient would like a call back to discuss those symptoms, Okay to LVM

## 2023-02-02 NOTE — TELEPHONE ENCOUNTER
Call placed to Patient    This morning he was signing his name to a check, and after signing his first name, he was unable to sign his last name.  He feels he could spell the letters out in his mind, but couldnt remember how to write the letters.  About 10 minutes later at work he tried again and could not sign the last name  Eventually he wrote it out letter by letter, which allowed him to sign as usual thereafter    This has not happened before, and patient has concerns about what it could be  He feels the time he had difficulty with his name probably took 10 mins or more.  He is confident he did not lose awareness at any time     We discussed the recent Neura message to , where Javi reports the bottle of diazepam spilt (with accompanying pictures)  Javi will need a new prescription for this as the previous prescription is not current.    Javi is to go in to residential treatment of his eating disorder soon, and will need to take all his medications with him.    I told Javi I would let  know about the episode and the need for a new prescription    No other questions at this time

## 2023-02-03 RX ORDER — DIAZEPAM ORAL SOLUTION (CONCENTRATE) 5 MG/ML
SOLUTION ORAL
Qty: 30 ML | Refills: 0 | Status: SHIPPED | OUTPATIENT
Start: 2023-02-03

## 2023-02-03 RX ORDER — DIAZEPAM ORAL SOLUTION (CONCENTRATE) 5 MG/ML
5 SOLUTION ORAL PRN
Qty: 30 ML | Refills: 0 | Status: ON HOLD | OUTPATIENT
Start: 2023-02-03 | End: 2023-09-17

## 2023-03-14 ENCOUNTER — VIRTUAL VISIT (OUTPATIENT)
Dept: NEUROLOGY | Facility: CLINIC | Age: 38
End: 2023-03-14
Payer: COMMERCIAL

## 2023-03-14 VITALS
HEIGHT: 71 IN | WEIGHT: 262 LBS | BODY MASS INDEX: 36.68 KG/M2 | DIASTOLIC BLOOD PRESSURE: 73 MMHG | SYSTOLIC BLOOD PRESSURE: 117 MMHG

## 2023-03-14 DIAGNOSIS — R56.9 SEIZURE (H): ICD-10-CM

## 2023-03-14 DIAGNOSIS — G43.709 CHRONIC MIGRAINE WITHOUT AURA WITHOUT STATUS MIGRAINOSUS, NOT INTRACTABLE: ICD-10-CM

## 2023-03-14 RX ORDER — LAMOTRIGINE 100 MG/1
TABLET, ORALLY DISINTEGRATING ORAL
Qty: 180 TABLET | Refills: 11 | Status: ON HOLD | OUTPATIENT
Start: 2023-03-14 | End: 2023-09-17

## 2023-03-14 RX ORDER — TOPIRAMATE 100 MG/1
100 TABLET, FILM COATED ORAL 2 TIMES DAILY
Qty: 180 TABLET | Refills: 3 | Status: SHIPPED | OUTPATIENT
Start: 2023-03-14 | End: 2024-01-23

## 2023-03-14 ASSESSMENT — PATIENT HEALTH QUESTIONNAIRE - PHQ9: SUM OF ALL RESPONSES TO PHQ QUESTIONS 1-9: 3

## 2023-03-14 ASSESSMENT — PAIN SCALES - GENERAL: PAINLEVEL: NO PAIN (0)
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
## 2023-03-14 NOTE — NURSING NOTE
Is the patient currently in the state of [place]? YES    Visit mode:VIDEO    If the visit is dropped, the patient can be reconnected by: VIDEO VISIT: Text to cell phone: [phone]    Will anyone else be joining the visit? NO      How would you like to obtain your AVS? MyChart    Are changes needed to the allergy or medication list? NO    Reason for visit: Follow up appt.     Medication and allergies have been reviewed.     Abimael Vega, VF
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
## 2023-03-14 NOTE — PATIENT INSTRUCTIONS
Increase lamotrigine Week 1-2: lamotrigine 250 mg morning and 200 mg evening. Week 3-4: 300 mg morning and 250 mg evening. Week 5-onward: 300 mg morning and 300 mg evening.     We can not use lamotrigine XR due to his need for ODT (gastric surgery and gastric pouch)    Continue topiramate 100 mg twice a day     Diazepam: Give 1 ml (5mg): Give 1 ml for seizures greater 5 minutes or more than 2 seizures within an 8 hour period. May repeat once 1 ml (5mg). Monitor breathing, if there any concerns call 911. Do not exceed 10 mg per day.     Follow up  6 months    Emotional health follow up    Migraine management with Emgality     Lesly Snyder MD

## 2023-04-10 ENCOUNTER — OFFICE VISIT (OUTPATIENT)
Dept: NEUROLOGY | Facility: CLINIC | Age: 38
End: 2023-04-10
Payer: COMMERCIAL

## 2023-04-10 VITALS — OXYGEN SATURATION: 96 % | HEART RATE: 89 BPM | SYSTOLIC BLOOD PRESSURE: 119 MMHG | DIASTOLIC BLOOD PRESSURE: 83 MMHG

## 2023-04-10 DIAGNOSIS — G43.709 CHRONIC MIGRAINE WITHOUT AURA WITHOUT STATUS MIGRAINOSUS, NOT INTRACTABLE: Primary | ICD-10-CM

## 2023-04-10 PROCEDURE — 99214 OFFICE O/P EST MOD 30 MIN: CPT | Performed by: PSYCHIATRY & NEUROLOGY

## 2023-04-10 RX ORDER — ERGOCALCIFEROL 1.25 MG/1
1 CAPSULE, LIQUID FILLED ORAL WEEKLY
Status: ON HOLD | COMMUNITY
Start: 2023-03-18 | End: 2024-04-19

## 2023-04-10 RX ORDER — ONDANSETRON 4 MG/1
1 TABLET, FILM COATED ORAL PRN
Status: ON HOLD | COMMUNITY
Start: 2023-03-22 | End: 2023-09-17

## 2023-04-10 RX ORDER — LAMOTRIGINE 100 MG/1
1 TABLET ORAL DAILY
Status: ON HOLD | COMMUNITY
Start: 2023-03-29 | End: 2023-09-17

## 2023-04-10 NOTE — LETTER
4/10/2023       RE: Javi Brown  [address] [place] [address]     Dear Colleague,    Thank you for referring your patient, Javi Brown, to the [facility] NEUROLOGY CLINIC [place] at [facility]. Please see a copy of my visit note below.    [facility]    Headache Neurology Progress Note  April 10, 2023    Subjective:    Javi Brown returns for follow up of chronic migraine.  I last saw him in November 2022.    Today, he reports headaches have continued at a high frequency and severity since I last saw him. He reports 30/30 headache days per month in the last few months. All are severe.  He has not identified any provoking factors.    Sumatriptan injectable remains effective, but not as effective as previous. He used this 3 times in the last 2 weeks.    He thinks Emgality helped initially, but doesn't anymore.    He's participating in the [employer] program. He has had to miss groups due to headaches.     He reports his seizures are about the same.    He hit his head in an elevator a few months ago, he recalls.     He's had trouble remembering how to sign his name and thinking of names. He's not sure if he's had other cognitive changes.    He denies new numbness, weakness, vision changes.    Objective:    Vitals: /83   Pulse 89   SpO2 96%   General: Cooperative, NAD  Neurologic:  Mental Status: Fully alert, attentive and oriented. Speech clear and fluent.   Cranial Nerves: Facial movements symmetric.  Disconjugate gaze.  Hearing intact to conversation.  Motor: No abnormal movements.  Strength intact in proximal and distal upper and lower extremities.  Normal tone.    Sensory: Intact to light touch and vibration in distal extremities bilaterally, upper and lower.  Reflexes: 3+ and symmetric throughout, with downgoing toe on the left equivocal on the right.  Coordination: Finger-nose-finger intact.   Normal finger tapping bilaterally.  Tandem gait with difficulty, able to complete toe heel walking across the room and on third attempt.  Gait: Normal casual gait.    Assessment/Plan:   Javi Brown is a 37-year-old man with epileptic and nonepileptic seizures, and chronic migraine who returns for follow-up. He has had worsening headaches since last seen, despite reinstituting Emgality for headache prevention.    It is unclear why his headaches continue to worsen.  I recommend further evaluation for secondary causes of headache, with an MRI of the brain with and without contrast and an updated ophthalmologic examination.  His neurologic examination today shows intermittent disconjugate gaze, difficulty with tandem gait.     He will continue his current symptomatic treatment strategy, with Emgality for headache prevention, and sumatriptan  subcutaneous injection up to 9 days a month for acute treatment of severe headache.  -Botulinum toxin injections or an alternative CGRP inhibitor will be considered in the future, if needed.  He would not choose to switch his preventative treatment today.  -Would avoid beta-blockers given depression history. Would also avoid TCA's as they are known to lower the seizure threshold and would be concerned for excess side-effects w/ his concurrent medications.     I will plan to see him back in 3 months to monitor his progress. Additional work-up may be recommended based on results.          Again, thank you for allowing me to participate in the care of your patient.      Sincerely,    Swati Hernandez MD

## 2023-04-10 NOTE — PROGRESS NOTES
[facility]    Headache Neurology Progress Note  April 10, 2023    Subjective:    Javi Brown returns for follow up of chronic migraine.  I last saw him in November 2022.    Today, he reports headaches have continued at a high frequency and severity since I last saw him. He reports 30/30 headache days per month in the last few months. All are severe.  He has not identified any provoking factors.    Sumatriptan injectable remains effective, but not as effective as previous. He used this 3 times in the last 2 weeks.    He thinks Emgality helped initially, but doesn't anymore.    He's participating in the [employer] program. He has had to miss groups due to headaches.     He reports his seizures are about the same.    He hit his head in an elevator a few months ago, he recalls.     He's had trouble remembering how to sign his name and thinking of names. He's not sure if he's had other cognitive changes.    He denies new numbness, weakness, vision changes.    Objective:    Vitals: /83   Pulse 89   SpO2 96%   General: Cooperative, NAD  Neurologic:  Mental Status: Fully alert, attentive and oriented. Speech clear and fluent.   Cranial Nerves: Facial movements symmetric.  Disconjugate gaze.  Hearing intact to conversation.  Motor: No abnormal movements.  Strength intact in proximal and distal upper and lower extremities.  Normal tone.    Sensory: Intact to light touch and vibration in distal extremities bilaterally, upper and lower.  Reflexes: 3+ and symmetric throughout, with downgoing toe on the left equivocal on the right.  Coordination: Finger-nose-finger intact.  Normal finger tapping bilaterally.  Tandem gait with difficulty, able to complete toe heel walking across the room and on third attempt.  Gait: Normal casual gait.    Assessment/Plan:   Javi Brown is a 37-year-old man with epileptic and nonepileptic seizures, and chronic migraine who returns for follow-up. He has  had worsening headaches since last seen, despite reinstituting Emgality for headache prevention.    It is unclear why his headaches continue to worsen.  I recommend further evaluation for secondary causes of headache, with an MRI of the brain with and without contrast and an updated ophthalmologic examination.  His neurologic examination today shows intermittent disconjugate gaze, difficulty with tandem gait.     He will continue his current symptomatic treatment strategy, with Emgality for headache prevention, and sumatriptan  subcutaneous injection up to 9 days a month for acute treatment of severe headache.  -Botulinum toxin injections or an alternative CGRP inhibitor will be considered in the future, if needed.  He would not choose to switch his preventative treatment today.  -Would avoid beta-blockers given depression history. Would also avoid TCA's as they are known to lower the seizure threshold and would be concerned for excess side-effects w/ his concurrent medications.     I will plan to see him back in 3 months to monitor his progress. Additional work-up may be recommended based on results.    Swati Hernandez MD

## 2023-04-21 ENCOUNTER — HOSPITAL ENCOUNTER (OUTPATIENT)
Dept: MRI IMAGING | Facility: CLINIC | Age: 38
Discharge: HOME | End: 2023-04-21
Attending: PSYCHIATRY & NEUROLOGY | Admitting: PSYCHIATRY & NEUROLOGY
Payer: COMMERCIAL

## 2023-04-21 DIAGNOSIS — G43.709 CHRONIC MIGRAINE WITHOUT AURA WITHOUT STATUS MIGRAINOSUS, NOT INTRACTABLE: ICD-10-CM

## 2023-04-21 PROCEDURE — 70553 MRI BRAIN STEM W/O & W/DYE: CPT | Mod: 26 | Performed by: RADIOLOGY

## 2023-04-21 PROCEDURE — 70553 MRI BRAIN STEM W/O & W/DYE: CPT

## 2023-04-21 PROCEDURE — 255N000002 HC RX 255 OP 636: Performed by: PREVENTIVE MEDICINE

## 2023-04-21 PROCEDURE — A9585 GADOBUTROL INJECTION: HCPCS | Performed by: PREVENTIVE MEDICINE

## 2023-04-21 RX ORDER — GADOBUTROL 604.72 MG/ML
10 INJECTION INTRAVENOUS ONCE
Status: COMPLETED | OUTPATIENT
Start: 2023-04-21 | End: 2023-04-21

## 2023-04-21 RX ADMIN — GADOBUTROL 10 ML: 604.72 INJECTION INTRAVENOUS at 11:43

## 2023-04-28 ENCOUNTER — MYC MEDICAL ADVICE (OUTPATIENT)
Dept: NEUROLOGY | Facility: CLINIC | Age: 38
End: 2023-04-28

## 2023-04-28 DIAGNOSIS — R56.9 SEIZURE (H): Primary | ICD-10-CM

## 2023-06-01 ENCOUNTER — OFFICE VISIT (OUTPATIENT)
Dept: OPHTHALMOLOGY | Facility: CLINIC | Age: 38
End: 2023-06-01
Attending: PSYCHIATRY & NEUROLOGY
Payer: COMMERCIAL

## 2023-06-01 DIAGNOSIS — G43.709 CHRONIC MIGRAINE WITHOUT AURA WITHOUT STATUS MIGRAINOSUS, NOT INTRACTABLE: ICD-10-CM

## 2023-06-01 DIAGNOSIS — H50.05 ALTERNATING ESOTROPIA: Primary | ICD-10-CM

## 2023-06-01 DIAGNOSIS — H01.009 BLEPHARITIS OF BOTH UPPER AND LOWER EYELID: ICD-10-CM

## 2023-06-01 DIAGNOSIS — H52.13 MYOPIA OF BOTH EYES WITH ASTIGMATISM: ICD-10-CM

## 2023-06-01 DIAGNOSIS — H52.203 MYOPIA OF BOTH EYES WITH ASTIGMATISM: ICD-10-CM

## 2023-06-01 PROCEDURE — 92004 COMPRE OPH EXAM NEW PT 1/>: CPT | Performed by: OPTOMETRIST

## 2023-06-01 PROCEDURE — 92015 DETERMINE REFRACTIVE STATE: CPT | Performed by: OPTOMETRIST

## 2023-06-01 ASSESSMENT — REFRACTION_MANIFEST
OD_CYLINDER: +0.75
OD_AXIS: 090
OS_SPHERE: -3.50
OS_CYLINDER: +2.00
OD_SPHERE: -2.00
OS_AXIS: 090

## 2023-06-01 ASSESSMENT — CONF VISUAL FIELD
OD_SUPERIOR_NASAL_RESTRICTION: 0
OS_INFERIOR_TEMPORAL_RESTRICTION: 0
OS_SUPERIOR_NASAL_RESTRICTION: 0
OD_INFERIOR_TEMPORAL_RESTRICTION: 0
OD_INFERIOR_NASAL_RESTRICTION: 0
METHOD: COUNTING FINGERS
OD_NORMAL: 1
OS_NORMAL: 1
OD_SUPERIOR_TEMPORAL_RESTRICTION: 0
OS_INFERIOR_NASAL_RESTRICTION: 0
OS_SUPERIOR_TEMPORAL_RESTRICTION: 0

## 2023-06-01 ASSESSMENT — VISUAL ACUITY
OS_CC: 20/20
CORRECTION_TYPE: GLASSES
METHOD: SNELLEN - LINEAR
OD_CC: 20/20
OS_CC+: -1
OD_CC+: -2

## 2023-06-01 ASSESSMENT — CUP TO DISC RATIO
OD_RATIO: 0.15
OS_RATIO: 0.15

## 2023-06-01 ASSESSMENT — REFRACTION_WEARINGRX
OS_AXIS: 088
OS_CYLINDER: +1.50
SPECS_TYPE: SVL
OD_CYLINDER: +1.00
OD_AXIS: 085
OS_SPHERE: -3.00
OD_SPHERE: -1.75

## 2023-06-01 ASSESSMENT — SLIT LAMP EXAM - LIDS
COMMENTS: 1+ BLEPH WITH FLAKES
COMMENTS: 1+ BLEPH WITH FLAKES

## 2023-06-01 ASSESSMENT — TONOMETRY
OD_IOP_MMHG: 19
IOP_METHOD: ICARE
OS_IOP_MMHG: 19

## 2023-06-01 NOTE — PROGRESS NOTES
A/P  1.) Alternating esotropia  -Large angle ET, h/o strabismus surgery as a child and patching. Both eyes correct well, no amblyopia  -No double vision, cosmetically bothersome at times  -Reviewed options including observation vs opinion on repeat strabismus surgery to improve cosmesis/alignment. He is interested in an evaluation    2.) Myopia/Astigmatism OU  -Corrects well both eyes with Rx    3.) Blepharitis OU  -Mildly symptomatic for itching  -Start lid scrubs daily    Monitor 1-2 years comprehensive. Refer for strabismus surgery evaluation    I have confirmed the patient's CC, HPI and reviewed Past Medical History, Past Surgical History, Social History, Family History, Problem List, Medication List and agree with Tech note.     Chapis Shea, HU[facility]

## 2023-06-01 NOTE — PATIENT INSTRUCTIONS
Lid scrubs: These can help prevent buildup of debris on eyelids/eyelashes and help reduce inflammation of the eyelids (blepharitis). Use daily.  -Ocusoft Plus lid wipes  -Ocusoft Plus Hypochlor foaming lid cleanser  -Systane lid wipes  -Tranquileyes 1% Tea Tree Eyelid & Facial Cleanser by [employer]  -Avenova Lid   -Cliradex Lid Wipes  -We Love Eyes Foaming Tea Tree Cleanser  -Oasis-LID  N LASH pads.       To purchase these products you can look over-the-counter at [employer] or purchase online at the following websites:  -[email or web address]  -[email or web address]

## 2023-06-01 NOTE — NURSING NOTE
Chief Complaints and History of Present Illnesses   Patient presents with     Annual Eye Exam     Pt here for new adult annual eye exam.      Chief Complaint(s) and History of Present Illness(es)     Annual Eye Exam            Laterality: both eyes    Associated symptoms: floaters.  Negative for eye pain and headache    Comments: Pt here for new adult annual eye exam.           Comments    Pt last eye exam was 4 years ago. Pt has hx of strabismus surgery as a child and patching. Otherwise healthy eyes. Vision is largely unchanged. Has hx of floaters and migraines.   Jacqueline Turner, COA on 6/1/2023 at 7:31 AM

## 2023-06-04 ENCOUNTER — HEALTH MAINTENANCE LETTER (OUTPATIENT)
Age: 38
End: 2023-06-04

## 2023-06-21 NOTE — PROGRESS NOTES
1. Very large angle congenital esotropia:    Patient has occasional diplopia and also interested in reconstructive benefit of strabismus surgery.  Discussed the risks, benefits, and alternatives of strabismus surgery and patient wishes to proceed. Plan for bilateral strabismus surgery       Javi Brown is a pleasant 37 year old White male who presents to my neuro-ophthalmology clinic today having been referred by Dr. Shea for evaluation for strabismus surgery.    HPI:    Javi Brown is a 37 year old male with a PMH of seizures and congenital strabismus presenting for evaluation for strabismus surgery. He had had strabismus surgery as a child and did patching. He has a large angle esotropia that is cosmetically bothersome to him at times and so he presents for surgical evaluation.    He reports he had surgery between 1-2 years old; he believes his eye turned in prior to surgery.  He seldom has double vision (rarely when tired).  He does note that occasionally he has some difficulty focusing and feels as though the image he sees is smaller than it should be (for example, when watching TV).    He reports longstanding migraine headaches.  Typically left-sided, beginning behind his eye.  Since about 2021, his migraines have significantly worsened.  Currently managed with Emgality and Imitrex, which has been somewhat helpful.    He manages his seizure disorder with Dr. Snyder ([facility])    Patient occasionally has diplopia particularly when tired.    Independent historians:  Patient    Review of outside testing:    MRI brain wwo 4/21/23    Review of outside clinical notes:    6/1/23 -- Visit with Dr. Shea        Past medical history:    Patient Active Problem List   Diagnosis     Seizures (H)     Patient is currently taking Lamictal, Zofran as needed , Vitamin D2, Topamax, Diazepam, Imitrex, Emgality,    Family history / social history:  Patient's family history is  unremarkable.     Patient  reports that he has never smoked. He has never used smokeless tobacco. He reports that he does not drink alcohol and does not use drugs.     Exam:  Corrected distance visual acuity was 20/20 in the right eye and 20/20 in the left eye. Intraocular pressure was 15 in the right eye and 16 in the left eye using ICare.  Color vision 11/11 right eye and 11/11 left eye.  Pupils isocoric with no APD.  Anterior segment and fundus exam were unremarkable.    Tests ordered and interpreted today:    Sensorimotor exam revealed full motility and a 65-80 prism diopter relatively concomitant esotropia with a mild A pattern.    We discussed in detail the risks, benefits, and alternatives of eye muscle correction surgery including the very rare risk of death or serious morbidity from a general anesthesia complication and the rare risk of severe vision loss in the operative eye(s) secondary to retinal detachment or endophthalmitis.  We discussed more likely sub-optimal outcomes including the unanticipated need for additional strabismus surgery.  Finally the patient was aware that prisms glasses may be required to optimize single vision following surgery.    After a thorough discussion of these risks, the patient decided to proceed with strabismus surgery.  The surgical plan is as follows:     1. Bilateral eye muscle correction.      May need three muscle surgery given size of misalignment.    Follow-up 1 week after strabismus surgery.    Plan to operate at: [facility]  Prism free glasses for adjustment:  Non-adjustable    Patient gets bradycardic under anesthesia he states           Precharting:  Damari Wynn MD  Ophthalmology Resident, PGY-3  [facility]    Sil Albarran, OD    35 minutes were spent on the date of the encounter by me doing chart review, history and exam, documentation, and further activities as noted above    Complete documentation of historical and exam elements from today's  encounter can be found in the full encounter summary report (not reduplicated in this progress note).  I personally obtained the chief complaint(s) and history of present illness.  I confirmed and edited as necessary the review of systems, past medical/surgical history, family history, social history, and examination findings as documented by others; and I examined the patient myself.  I personally reviewed the relevant tests, images, and reports as documented above.  I formulated and edited as necessary the assessment and plan and discussed the findings and management plan with the patient and family.  I personally reviewed the ophthalmic test(s) associated with this encounter, agree with the interpretation(s) as documented by the resident/fellow, and have edited the corresponding report(s) as necessary.     Keny Howe MD

## 2023-06-22 ENCOUNTER — OFFICE VISIT (OUTPATIENT)
Dept: OPHTHALMOLOGY | Facility: CLINIC | Age: 38
End: 2023-06-22
Attending: OPHTHALMOLOGY
Payer: COMMERCIAL

## 2023-06-22 ENCOUNTER — TELEPHONE (OUTPATIENT)
Dept: OPHTHALMOLOGY | Facility: CLINIC | Age: 38
End: 2023-06-22

## 2023-06-22 DIAGNOSIS — H50.05 ALTERNATING ESOTROPIA: Primary | ICD-10-CM

## 2023-06-22 DIAGNOSIS — H53.2 DOUBLE VISION: ICD-10-CM

## 2023-06-22 DIAGNOSIS — H53.10 SUBJECTIVE VISUAL DISTURBANCE: ICD-10-CM

## 2023-06-22 PROCEDURE — 99204 OFFICE O/P NEW MOD 45 MIN: CPT | Performed by: OPHTHALMOLOGY

## 2023-06-22 PROCEDURE — 92060 SENSORIMOTOR EXAMINATION: CPT | Performed by: OPHTHALMOLOGY

## 2023-06-22 PROCEDURE — 92060 SENSORIMOTOR EXAMINATION: CPT | Mod: 26 | Performed by: OPHTHALMOLOGY

## 2023-06-22 PROCEDURE — G0463 HOSPITAL OUTPT CLINIC VISIT: HCPCS | Performed by: OPHTHALMOLOGY

## 2023-06-22 ASSESSMENT — REFRACTION_WEARINGRX
OS_SPHERE: -3.00
OS_AXIS: 088
OS_CYLINDER: +1.50
OD_SPHERE: -1.75
OD_AXIS: 085
SPECS_TYPE: SVL
OD_CYLINDER: +1.00

## 2023-06-22 ASSESSMENT — CONF VISUAL FIELD
OD_INFERIOR_NASAL_RESTRICTION: 0
OD_SUPERIOR_NASAL_RESTRICTION: 0
OD_SUPERIOR_TEMPORAL_RESTRICTION: 0
OS_SUPERIOR_TEMPORAL_RESTRICTION: 0
OS_INFERIOR_TEMPORAL_RESTRICTION: 0
OS_SUPERIOR_NASAL_RESTRICTION: 0
OS_NORMAL: 1
OD_INFERIOR_TEMPORAL_RESTRICTION: 0
OS_INFERIOR_NASAL_RESTRICTION: 0
OD_NORMAL: 1
METHOD: COUNTING FINGERS

## 2023-06-22 ASSESSMENT — TONOMETRY
OD_IOP_MMHG: 15
OS_IOP_MMHG: 16
IOP_METHOD: ICARE

## 2023-06-22 ASSESSMENT — EXTERNAL EXAM - LEFT EYE: OS_EXAM: NORMAL

## 2023-06-22 ASSESSMENT — SLIT LAMP EXAM - LIDS
COMMENTS: 2+ SCURF
COMMENTS: 2+ SCURF

## 2023-06-22 ASSESSMENT — VISUAL ACUITY
METHOD: SNELLEN - LINEAR
CORRECTION_TYPE: GLASSES
OD_CC: 20/20
OS_CC: 20/20

## 2023-06-22 ASSESSMENT — EXTERNAL EXAM - RIGHT EYE: OD_EXAM: NORMAL

## 2023-06-22 ASSESSMENT — CUP TO DISC RATIO
OS_RATIO: 0.1
OD_RATIO: 0.1

## 2023-06-22 NOTE — NURSING NOTE
Chief Complaint(s) and History of Present Illness(es)     Strabismus Evaluation    In both eyes.  Disease is present since childhood.  Occurring constantly.  Occurring all the time.  Since onset it is stable.  Associated symptoms include eye pain, blurred vision and headaches.  Pain was noted as 0/10.           Comments    Javi Brown is a 37 year old male sent for consultation by Dr. Shea for alternating esotropia.    Patient with history of strabismus surgery as a child and patched the right eye as a child (was very farsighted).  Javi says left eye tends to cross in without his glasses.  Friends and family have also noticed more eye crossing.  He denies double vision.  He often gets clear floaters and spots in his vision. He does get migraines behind the left eye along with some eye discomfort.   SUNIL Rahman 6/22/2023 12:35 PM

## 2023-06-22 NOTE — LETTER
2023    RE: Javi Brown  : 1985  MRN: [id number]    Dear Dr. Matthews,    Thank you for referring your patient, Javi Borwn, to my neuro-ophthalmology clinic recently.  After a thorough neuro-ophthalmic history and examination, I came to the following conclusions:     1. Very large angle congenital esotropia:    Patient has occasional diplopia and also interested in reconstructive benefit of strabismus surgery.  Discussed the risks, benefits, and alternatives of strabismus surgery and patient wishes to proceed. Plan for bilateral strabismus surgery       Javi Brown is a pleasant 37 year old White male who presents to my neuro-ophthalmology clinic today having been referred by Dr. Shea for evaluation for strabismus surgery.    HPI: Javi Brown is a 37 year old male with a PMH of seizures and congenital strabismus presenting for evaluation for strabismus surgery. He had had strabismus surgery as a child and did patching. He has a large angle esotropia that is cosmetically bothersome to him at times and so he presents for surgical evaluation.    He reports he had surgery between 1-1 years old; he believes his eye turned in prior to surgery.  He seldom has double vision (rarely when tired).  He does note that occasionally he has some difficulty focusing and feels as though the image he sees is smaller than it should be (for example, when watching TV).    He reports longstanding migraine headaches.  Typically left-sided, beginning behind his eye.  Since about , his migraines have significantly worsened.  Currently managed with Emgality and Imitrex, which has been somewhat helpful.    He manages his seizure disorder with Dr. Snyder ([facility] affiliated)    Patient occasionally has diplopia particularly when tired.    Independent historians: Patient    Review of outside testing:  MRI brain wwo 23    Review of outside clinical notes:  23 -- Visit with  Dr. Shea        Past medical history:  Patient Active Problem List   Diagnosis    Seizures (H)     Patient is currently taking Lamictal, Zofran as needed , Vitamin D2, Topamax, Diazepam, Imitrex, Emgality,    Family history / social history: Patient's family history is unremarkable.     Patient  reports that he has never smoked. He has never used smokeless tobacco. He reports that he does not drink alcohol and does not use drugs.     Exam: Corrected distance visual acuity was 20/20 in the right eye and 20/20 in the left eye. Intraocular pressure was 15 in the right eye and 16 in the left eye using ICare.  Color vision 11/11 right eye and 11/11 left eye.  Pupils isocoric with no APD.  Anterior segment and fundus exam were unremarkable.    Tests ordered and interpreted today: Sensorimotor exam revealed full motility and a 65-80 prism diopter relatively concomitant esotropia with a mild A pattern.    We discussed in detail the risks, benefits, and alternatives of eye muscle correction surgery including the very rare risk of death or serious morbidity from a general anesthesia complication and the rare risk of severe vision loss in the operative eye(s) secondary to retinal detachment or endophthalmitis.  We discussed more likely sub-optimal outcomes including the unanticipated need for additional strabismus surgery.  Finally the patient was aware that prisms glasses may be required to optimize single vision following surgery.    After a thorough discussion of these risks, the patient decided to proceed with strabismus surgery.  The surgical plan is as follows:   1. Bilateral eye muscle correction.      May need three muscle surgery given size of misalignment.    Follow-up 1 week after strabismus surgery.    Plan to operate at: ASC  Prism free glasses for adjustment:  Non-adjustable    Patient gets bradycardic under anesthesia he states    Again, thank you for trusting me with the care of your patient.  For further  exam details, please feel free to contact our office for additional records.  If you wish to contact me regarding this patient please email me at [email or web address] or give my clinic a call to arrange a phone conversation.    Sincerely,    Keny Howe MD  , Neuro-Ophthalmology and Adult Strabismus Surgery  [facility] in Neuro-Ophthalmology  Department of Ophthalmology and Visual Neurosciences  [facility]    DX: congenital esotropia, strabismus surgery

## 2023-06-23 PROBLEM — H50.05 ALTERNATING ESOTROPIA: Status: ACTIVE | Noted: 2023-06-22

## 2023-07-14 ENCOUNTER — TELEPHONE (OUTPATIENT)
Dept: OPHTHALMOLOGY | Facility: CLINIC | Age: 38
End: 2023-07-14
Payer: COMMERCIAL

## 2023-07-14 NOTE — TELEPHONE ENCOUNTER
7/18/2023 11:116AM Spoke with Ziyad about rescheduling his surgery from 9/11 to 8/21. He states 8/21 will work for his schedule, but he is unsure about rescheduling. We will leave his surgery scheduled on 9/11/2023.    7/14/2023 2:19PM Offered to reschedule Javi's 9/11 surgery to 8/21. He is not sure if that date will work for his work schedule, but will check. I will call back on 7/18 to see if he wishes to reschedule.

## 2023-08-07 ENCOUNTER — ANCILLARY PROCEDURE (OUTPATIENT)
Dept: NEUROLOGY | Facility: CLINIC | Age: 38
End: 2023-08-07
Attending: PSYCHIATRY & NEUROLOGY
Payer: COMMERCIAL

## 2023-08-07 DIAGNOSIS — R56.9 SEIZURE (H): ICD-10-CM

## 2023-08-08 ENCOUNTER — ANCILLARY PROCEDURE (OUTPATIENT)
Dept: NEUROLOGY | Facility: CLINIC | Age: 38
End: 2023-08-08
Attending: PSYCHIATRY & NEUROLOGY
Payer: COMMERCIAL

## 2023-08-08 DIAGNOSIS — R56.9 SEIZURE (H): ICD-10-CM

## 2023-08-09 ENCOUNTER — ANCILLARY PROCEDURE (OUTPATIENT)
Dept: NEUROLOGY | Facility: CLINIC | Age: 38
End: 2023-08-09
Attending: PSYCHIATRY & NEUROLOGY
Payer: COMMERCIAL

## 2023-08-29 ENCOUNTER — TELEPHONE (OUTPATIENT)
Dept: OPHTHALMOLOGY | Facility: CLINIC | Age: 38
End: 2023-08-29
Payer: COMMERCIAL

## 2023-08-29 NOTE — TELEPHONE ENCOUNTER
8/29/2023 2:27PM Relayed to Javi that there is no paperwork for him to complete for surgery. However, there is paperwork for his PCP to complete at his H&P within 30 days before surgery. He should call his PCP today to schedule an H&P for next week and take the H&P paperwork with him. He will call to schedule.    8/29/2023 1:06PM Javi LVM stating that he hasn't finished filling out the paperwork he was supposed to complete 30 days prior to surgery nor has he scheduled an H&P for surgery with Dr. Howe on 9/11. He wonders if he should reschedule his surgery to after he goes to [place] at the end of September.

## 2023-08-31 ENCOUNTER — TRANSFERRED RECORDS (OUTPATIENT)
Dept: HEALTH INFORMATION MANAGEMENT | Facility: CLINIC | Age: 38
End: 2023-08-31
Payer: COMMERCIAL

## 2023-09-08 ENCOUNTER — ANESTHESIA EVENT (OUTPATIENT)
Dept: SURGERY | Facility: AMBULATORY SURGERY CENTER | Age: 38
End: 2023-09-08
Payer: COMMERCIAL

## 2023-09-11 ENCOUNTER — HOSPITAL ENCOUNTER (OUTPATIENT)
Facility: AMBULATORY SURGERY CENTER | Age: 38
Discharge: HOME | End: 2023-09-11
Attending: OPHTHALMOLOGY
Payer: COMMERCIAL

## 2023-09-11 ENCOUNTER — ANESTHESIA (OUTPATIENT)
Dept: SURGERY | Facility: AMBULATORY SURGERY CENTER | Age: 38
End: 2023-09-11
Payer: COMMERCIAL

## 2023-09-11 VITALS
OXYGEN SATURATION: 96 % | DIASTOLIC BLOOD PRESSURE: 68 MMHG | RESPIRATION RATE: 16 BRPM | SYSTOLIC BLOOD PRESSURE: 116 MMHG | HEIGHT: 70 IN | BODY MASS INDEX: 35.79 KG/M2 | HEART RATE: 59 BPM | TEMPERATURE: 97.1 F | WEIGHT: 250 LBS

## 2023-09-11 DIAGNOSIS — Z98.890 POSTSURGICAL STATE, EYE: Primary | ICD-10-CM

## 2023-09-11 PROCEDURE — 67311 REVISE EYE MUSCLE: CPT | Mod: XS,LT

## 2023-09-11 PROCEDURE — 67311 REVISE EYE MUSCLE: CPT | Mod: 50 | Performed by: OPHTHALMOLOGY

## 2023-09-11 RX ORDER — GLYCOPYRROLATE 0.2 MG/ML
INJECTION, SOLUTION INTRAMUSCULAR; INTRAVENOUS PRN
Status: DISCONTINUED | OUTPATIENT
Start: 2023-09-11 | End: 2023-09-11

## 2023-09-11 RX ORDER — HYDROMORPHONE HYDROCHLORIDE 1 MG/ML
0.2 INJECTION, SOLUTION INTRAMUSCULAR; INTRAVENOUS; SUBCUTANEOUS EVERY 5 MIN PRN
Status: DISCONTINUED | OUTPATIENT
Start: 2023-09-11 | End: 2023-09-12 | Stop reason: HOSPADM

## 2023-09-11 RX ORDER — ONDANSETRON 2 MG/ML
4 INJECTION INTRAMUSCULAR; INTRAVENOUS EVERY 30 MIN PRN
Status: DISCONTINUED | OUTPATIENT
Start: 2023-09-11 | End: 2023-09-12 | Stop reason: HOSPADM

## 2023-09-11 RX ORDER — HYDROMORPHONE HYDROCHLORIDE 1 MG/ML
0.4 INJECTION, SOLUTION INTRAMUSCULAR; INTRAVENOUS; SUBCUTANEOUS EVERY 5 MIN PRN
Status: DISCONTINUED | OUTPATIENT
Start: 2023-09-11 | End: 2023-09-12 | Stop reason: HOSPADM

## 2023-09-11 RX ORDER — ACETAMINOPHEN 325 MG/1
975 TABLET ORAL ONCE
Status: COMPLETED | OUTPATIENT
Start: 2023-09-11 | End: 2023-09-11

## 2023-09-11 RX ORDER — OXYMETAZOLINE HYDROCHLORIDE 0.05 G/100ML
SPRAY NASAL PRN
Status: DISCONTINUED | OUTPATIENT
Start: 2023-09-11 | End: 2023-09-11 | Stop reason: HOSPADM

## 2023-09-11 RX ORDER — PROPOFOL 10 MG/ML
INJECTION, EMULSION INTRAVENOUS PRN
Status: DISCONTINUED | OUTPATIENT
Start: 2023-09-11 | End: 2023-09-11

## 2023-09-11 RX ORDER — LIDOCAINE HYDROCHLORIDE 20 MG/ML
INJECTION, SOLUTION INFILTRATION; PERINEURAL PRN
Status: DISCONTINUED | OUTPATIENT
Start: 2023-09-11 | End: 2023-09-11

## 2023-09-11 RX ORDER — BALANCED SALT SOLUTION 6.4; .75; .48; .3; 3.9; 1.7 MG/ML; MG/ML; MG/ML; MG/ML; MG/ML; MG/ML
SOLUTION OPHTHALMIC PRN
Status: DISCONTINUED | OUTPATIENT
Start: 2023-09-11 | End: 2023-09-11 | Stop reason: HOSPADM

## 2023-09-11 RX ORDER — SODIUM CHLORIDE, SODIUM LACTATE, POTASSIUM CHLORIDE, CALCIUM CHLORIDE 600; 310; 30; 20 MG/100ML; MG/100ML; MG/100ML; MG/100ML
INJECTION, SOLUTION INTRAVENOUS CONTINUOUS
Status: DISCONTINUED | OUTPATIENT
Start: 2023-09-11 | End: 2023-09-12 | Stop reason: HOSPADM

## 2023-09-11 RX ORDER — OXYCODONE HYDROCHLORIDE 5 MG/1
10 TABLET ORAL
Status: DISCONTINUED | OUTPATIENT
Start: 2023-09-11 | End: 2023-09-12 | Stop reason: HOSPADM

## 2023-09-11 RX ORDER — ONDANSETRON 2 MG/ML
INJECTION INTRAMUSCULAR; INTRAVENOUS PRN
Status: DISCONTINUED | OUTPATIENT
Start: 2023-09-11 | End: 2023-09-11

## 2023-09-11 RX ORDER — OXYCODONE HYDROCHLORIDE 5 MG/1
5 TABLET ORAL
Status: COMPLETED | OUTPATIENT
Start: 2023-09-11 | End: 2023-09-11

## 2023-09-11 RX ORDER — SODIUM CHLORIDE, SODIUM LACTATE, POTASSIUM CHLORIDE, CALCIUM CHLORIDE 600; 310; 30; 20 MG/100ML; MG/100ML; MG/100ML; MG/100ML
INJECTION, SOLUTION INTRAVENOUS CONTINUOUS
Status: DISCONTINUED | OUTPATIENT
Start: 2023-09-11 | End: 2023-09-11 | Stop reason: HOSPADM

## 2023-09-11 RX ORDER — ACETAMINOPHEN 325 MG/1
975 TABLET ORAL ONCE
Status: DISCONTINUED | OUTPATIENT
Start: 2023-09-11 | End: 2023-09-12 | Stop reason: HOSPADM

## 2023-09-11 RX ORDER — FENTANYL CITRATE 50 UG/ML
25 INJECTION, SOLUTION INTRAMUSCULAR; INTRAVENOUS EVERY 5 MIN PRN
Status: DISCONTINUED | OUTPATIENT
Start: 2023-09-11 | End: 2023-09-12 | Stop reason: HOSPADM

## 2023-09-11 RX ORDER — ONDANSETRON 4 MG/1
4 TABLET, ORALLY DISINTEGRATING ORAL EVERY 30 MIN PRN
Status: DISCONTINUED | OUTPATIENT
Start: 2023-09-11 | End: 2023-09-12 | Stop reason: HOSPADM

## 2023-09-11 RX ORDER — FENTANYL CITRATE 50 UG/ML
50 INJECTION, SOLUTION INTRAMUSCULAR; INTRAVENOUS EVERY 5 MIN PRN
Status: DISCONTINUED | OUTPATIENT
Start: 2023-09-11 | End: 2023-09-12 | Stop reason: HOSPADM

## 2023-09-11 RX ORDER — DEXAMETHASONE SODIUM PHOSPHATE 4 MG/ML
INJECTION, SOLUTION INTRA-ARTICULAR; INTRALESIONAL; INTRAMUSCULAR; INTRAVENOUS; SOFT TISSUE PRN
Status: DISCONTINUED | OUTPATIENT
Start: 2023-09-11 | End: 2023-09-11

## 2023-09-11 RX ORDER — LIDOCAINE 40 MG/G
CREAM TOPICAL
Status: DISCONTINUED | OUTPATIENT
Start: 2023-09-11 | End: 2023-09-11 | Stop reason: HOSPADM

## 2023-09-11 RX ORDER — EPHEDRINE SULFATE 50 MG/ML
INJECTION, SOLUTION INTRAMUSCULAR; INTRAVENOUS; SUBCUTANEOUS PRN
Status: DISCONTINUED | OUTPATIENT
Start: 2023-09-11 | End: 2023-09-11

## 2023-09-11 RX ORDER — PROPOFOL 10 MG/ML
INJECTION, EMULSION INTRAVENOUS CONTINUOUS PRN
Status: DISCONTINUED | OUTPATIENT
Start: 2023-09-11 | End: 2023-09-11

## 2023-09-11 RX ORDER — PREDNISOLONE ACETATE 10 MG/ML
SUSPENSION/ DROPS OPHTHALMIC
Qty: 10 ML | Refills: 0 | Status: ON HOLD | OUTPATIENT
Start: 2023-09-11 | End: 2023-09-19

## 2023-09-11 RX ADMIN — DEXAMETHASONE SODIUM PHOSPHATE 4 MG: 4 INJECTION, SOLUTION INTRA-ARTICULAR; INTRALESIONAL; INTRAMUSCULAR; INTRAVENOUS; SOFT TISSUE at 08:46

## 2023-09-11 RX ADMIN — LIDOCAINE HYDROCHLORIDE 100 MG: 20 INJECTION, SOLUTION INFILTRATION; PERINEURAL at 08:47

## 2023-09-11 RX ADMIN — SODIUM CHLORIDE, SODIUM LACTATE, POTASSIUM CHLORIDE, CALCIUM CHLORIDE: 600; 310; 30; 20 INJECTION, SOLUTION INTRAVENOUS at 08:42

## 2023-09-11 RX ADMIN — PROPOFOL 200 MG: 10 INJECTION, EMULSION INTRAVENOUS at 08:47

## 2023-09-11 RX ADMIN — OXYCODONE HYDROCHLORIDE 5 MG: 5 TABLET ORAL at 10:17

## 2023-09-11 RX ADMIN — ONDANSETRON 4 MG: 2 INJECTION INTRAMUSCULAR; INTRAVENOUS at 09:00

## 2023-09-11 RX ADMIN — ACETAMINOPHEN 975 MG: 325 TABLET ORAL at 07:25

## 2023-09-11 RX ADMIN — PROPOFOL 150 MCG/KG/MIN: 10 INJECTION, EMULSION INTRAVENOUS at 08:47

## 2023-09-11 RX ADMIN — EPHEDRINE SULFATE 5 MG: 50 INJECTION, SOLUTION INTRAMUSCULAR; INTRAVENOUS; SUBCUTANEOUS at 09:21

## 2023-09-11 RX ADMIN — GLYCOPYRROLATE 0.2 MG: 0.2 INJECTION, SOLUTION INTRAMUSCULAR; INTRAVENOUS at 08:56

## 2023-09-11 ASSESSMENT — ENCOUNTER SYMPTOMS: SEIZURES: 1

## 2023-09-11 NOTE — BRIEF OP NOTE
[facility]    Brief Operative Note    Pre-operative diagnosis: Alternating esotropia [H50.05]  Post-operative diagnosis Same as pre-operative diagnosis    Procedure: Procedure(s):  Bilateral eye muscle correction.  Surgeon: Surgeon(s) and Role:     * Keny Howe MD - Primary     * Anna Morin MD - Resident - Assisting     * Dilcia Harris MD - Fellow - Assisting  Anesthesia: General   Estimated Blood Loss: <1cc    Drains: None  Specimens: * No specimens in log *  Findings:   As expected .  Complications: None.  Implants: * No implants in log *

## 2023-09-11 NOTE — OR NURSING
Patient s/p eye muscle correction and in PACU.  HR has been bradycardic in a range of 45-52 BPM.  BP stable in 110's to 120/60s. Has been slow to awaken and remains sedate.  Asymptomatic of HR.  Called anesthesiologist Dr. Vásquez.  He came to see patient.  Pt has a low resting HR historically.  Continue to monitor.  No further orders at this time.  Will proceed to Phase 2 recovery.

## 2023-09-11 NOTE — ANESTHESIA PROCEDURE NOTES
Airway       Patient location during procedure: OR  Staff -        Performed By: CRNA  Consent for Airway        Urgency: elective  Indications and Patient Condition       Indications for airway management: bertha-procedural         Mask difficulty assessment: 0 - not attempted    Final Airway Details       Final airway type: supraglottic airway    Supraglottic Airway Details        Type: LMA       Brand: LMA Unique       LMA size: 5    Post intubation assessment        Placement verified by: capnometry, equal breath sounds and chest rise        Number of attempts at approach: 1       Secured with: pink tape       Ease of procedure: easy       Dentition: Intact and Unchanged

## 2023-09-11 NOTE — ANESTHESIA PREPROCEDURE EVALUATION
Anesthesia Pre-Procedure Evaluation    Patient: Javi Brown   MRN: [id number] : 1985        Procedure : Procedure(s):  Bilateral eye muscle correction.          Past Medical History:   Diagnosis Date    Gastric bypass status for obesity       History reviewed. No pertinent surgical history.   Allergies   Allergen Reactions    Benadryl [Diphenhydramine]     Reglan [Metoclopramide]     Droperidol Unknown, Anxiety and Other (See Comments)     No reaction specified        Social History     Tobacco Use    Smoking status: Never    Smokeless tobacco: Never   Substance Use Topics    Alcohol use: Never      Wt Readings from Last 1 Encounters:   23 113.4 kg (250 lb)        Anesthesia Evaluation   Pt has had prior anesthetic.     No history of anesthetic complications       ROS/MED HX  ENT/Pulmonary:  - neg pulmonary ROS     Neurologic:  - neg neurologic ROS   (+)       seizures, last seizure: , features: absence, mentionned he has had 3 episodes of tonic clonic a long time ago, on lamotrigine. Did not take this AM,                       Cardiovascular:  - neg cardiovascular ROS     METS/Exercise Tolerance: >4 METS    Hematologic:  - neg hematologic  ROS     Musculoskeletal:  - neg musculoskeletal ROS     GI/Hepatic:  - neg GI/hepatic ROS     Renal/Genitourinary:  - neg Renal ROS     Endo:     (+)               Obesity,       Psychiatric/Substance Use:  - neg psychiatric ROS   (+) psychiatric history other (comment) (Personaliy disorder cluster type B)       Infectious Disease:  - neg infectious disease ROS     Malignancy:  - neg malignancy ROS     Other:  - neg other ROS          Physical Exam    Airway        Mallampati: III   TM distance: > 3 FB   Neck ROM: full   Mouth opening: > 3 cm    Respiratory Devices and Support         Dental       (+) Completely normal teeth      Cardiovascular          Rhythm and rate: regular and normal     Pulmonary           breath sounds clear to auscultation            OUTSIDE LABS:  CBC:   Lab Results   Component Value Date    WBC 7.5 01/28/2023    WBC 6.3 05/04/2020    HGB 13.6 01/28/2023    HGB 14.4 05/04/2020    HCT 41.1 01/28/2023    HCT 43.1 05/04/2020     01/28/2023     05/04/2020     BMP:   Lab Results   Component Value Date     01/28/2023     11/05/2021    POTASSIUM 3.7 01/28/2023    POTASSIUM 3.9 11/05/2021    CHLORIDE 106 01/28/2023    CHLORIDE 111 (H) 11/05/2021    CO2 22 01/28/2023    CO2 25 11/05/2021    BUN 12.0 01/28/2023    BUN 16 11/05/2021    CR 0.79 01/28/2023    CR 0.97 11/05/2021     (H) 01/28/2023    GLC 82 11/05/2021     COAGS: No results found for: PTT, INR, FIBR  POC:   Lab Results   Component Value Date     (H) 04/15/2021     HEPATIC:   Lab Results   Component Value Date    ALBUMIN 4.2 05/04/2020    PROTTOTAL 8.0 05/04/2020    ALT 13 02/23/2021    AST 30 02/23/2021    ALKPHOS 63 05/04/2020    BILITOTAL 0.9 05/04/2020     OTHER:   Lab Results   Component Value Date    MARKEL 9.0 01/28/2023    LIPASE 234 05/04/2020       Anesthesia Plan    ASA Status:  2       Anesthesia Type: General.     - Airway: LMA   Induction: Intravenous.           Consents    Anesthesia Plan(s) and associated risks, benefits, and realistic alternatives discussed. Questions answered and patient/representative(s) expressed understanding.     - Discussed:     - Discussed with:  Patient            Postoperative Care            Comments:                Avelino Vásquez MD

## 2023-09-11 NOTE — ANESTHESIA CARE TRANSFER NOTE
Patient: Javi Brown    Procedure: Procedure(s):  Bilateral eye muscle correction.       Diagnosis: Alternating esotropia [H50.05]  Diagnosis Additional Information: No value filed.    Anesthesia Type:   General     Note:    Oropharynx: oropharynx clear of all foreign objects  Level of Consciousness: awake  Oxygen Supplementation: face mask    Independent Airway: airway patency satisfactory and stable  Dentition: dentition unchanged  Vital Signs Stable: post-procedure vital signs reviewed and stable    Patient transferred to: PACU    Handoff Report: Identifed the Patient, Identified the Reponsible Provider, Reviewed the pertinent medical history, Discussed the surgical course, Reviewed Intra-OP anesthesia mangement and issues during anesthesia, Set expectations for post-procedure period and Allowed opportunity for questions and acknowledgement of understanding      Vitals:  Vitals Value Taken Time   /75 09/11/23 0952   Temp 36  C (96.8  F) 09/11/23 0952   Pulse 50 09/11/23 0952   Resp 13 09/11/23 0952   SpO2 97 % 09/11/23 0952   Vitals shown include unvalidated device data.    Electronically Signed By: SAMY Bates CRNA  September 11, 2023  9:53 AM

## 2023-09-11 NOTE — OP NOTE
"ATTENDING SURGEON:  Keny Howe MD    DATE OF PROCEDURE:  September 11, 2023    FIRST SURGICAL ASSISTANT:  EZEQUIEL CR MD: NEURO-OPHTHALMOLOGY FELLOW     SECOND SURGICAL ASSISTANT:   Marisela Morin MD- Ophthalmology Resident     ANESTHESIA:  General anesthesia    PREOPERATIVE DIAGNOSIS (ES):  Large angle alternating esotropia    POSTOPERATIVE DIAGNOSIS (ES):  Same    NAME OF OPERATION:  Right lateral rectus resection 9.0 mm  Left lateral rectus resection 9.0 mm    INDICATIONS FOR PROCEDURE:    The patient is a pleasant 38 year old male with a history of congenital strabismus status post strabismus surgery around age 1-2 years of age (precise procedure unknown).  Patient has had longstanding residual esotropia that has slowly increased in angle over time.  He was eager for reconstructive strabismus surgery and does occasionally note diplopia and is hopeful this would resolve with strabismus surgery.    I warned the patient about the risks, benefits, and alternatives of eye muscle surgery including a relatively small risk for severe infection, retinal detachment, and permanent vision loss of the eye.  I also discussed the possibility of postoperative double vision.  I discussed the possibility that due to postoperative double vision or a postoperative recurrent strabismus, the patient may require additional strabismus procedures in the future.  Understanding these risks, the patient decided to proceed with strabismus surgery.      DESCRIPTION OF PROCEDURE:    After the risks, benefits, and alternatives of eye muscle surgery were discussed with the patient and the patient's questions were answered both in clinic and on the day of surgery, written informed consent was obtained and witnessed in the preoperative holding area on the day of surgery.  The word \"yes\" was written over both eyes indicating that the patient consented to eye muscle correction in both eyes.  An intravenous line was placed and " light intravenous sedation was given.  The patient was transported to the operating room where after appropriate monitors were placed, the patient underwent induction of general anesthesia without complication.      Both eyes were prepped and draped in the usual sterile fashion for ophthalmic surgery and a lid speculum was placed in the right eye.  Forced duction testing in this eye revealed no restriction.  A trapezoidal temporal conjunctival flap was created using conjunctival forceps and See scissors.  This was reflected backwards to expose the right lateral rectus muscle which was isolated using a Andrew muscle hook.  The muscle was freed from Tenon's capsule with blunt dissection using a See scissors and cotton swab.  A double armed 6-0 Vicryl suture was then woven across the width of the muscle at a point measured to be 9.0 mm posterior to the insertion and tied to the edges.  A hemostat straight clamp was then clamped perpendicular to the muscle just anterior to the suture and the distal 8.5 mm muscle segment was excised with a See scissors and 0.3 forceps.  Both arms of the 6-0 Vicryl suture were then passed partial thickness through the sclera in a crossing swords technique at the physiologic insertion site.  At this point, the ends of the suture were tied together tightly advancing the muscle and completing a resection effect of 9.0 mm.  The needles were cut off and the ends were cut short.  The conjunctival flap was then re-opposed in the surgical bed and held in place using two 6-0 plain gut sutures.  The lid speculum was removed from the operative eye.     A lid speculum was placed in the left eye.  Forced duction testing in this eye revealed no restriction.  A trapezoidal temporal conjunctival flap was created using conjunctival forceps and See scissors.  This was reflected backwards to expose the left lateral rectus muscle which was isolated using a [place] muscle hook.  The  muscle was freed from Tenon's capsule with blunt dissection using a See scissors and cotton swab.  A double armed 6-0 Vicryl suture was then woven across the width of the muscle at a point measured to be 9.0 mm posterior to the insertion and tied to the edges.  A hemostat straight clamp was then clamped perpendicular to the muscle just anterior to the suture and the distal 8.5 mm muscle segment was excised with a See scissors and 0.3 forceps.  Both arms of the 6-0 Vicryl suture were then passed partial thickness through the sclera in a crossing swords technique at the physiologic insertion site.  At this point, the ends of the suture were tied together tightly advancing the muscle and completing a resection effect of 9.0 mm.  The needles were cut off and the ends were cut short.  The conjunctival flap was then re-opposed in the surgical bed and held in place using two 6-0 plain gut sutures.  The lid speculum was removed from the operative eye.     Maxitrol ointment was placed in both eyes.  The patient was awakened from general anesthesia without complication and discharged to the recovery room in stable condition.       SPECIMENS REMOVED:  None.      ESTIMATED BLOOD LOSS:  1 mL or less.    INTRAOPERATIVE FLUIDS:  As per anesthesia records.      SPONGE/INSTRUMENT/NEEDLE COUNTS:  All sponge, instrument, and needle counts were correct times two.    CONDITION ON DISCHARGE FROM OPERATING ROOM:  Stable.      COMPLICATIONS:  None.     I was present for the entire procedure

## 2023-09-11 NOTE — ANESTHESIA POSTPROCEDURE EVALUATION
Patient: Javi Brown    Procedure: Procedure(s):  Bilateral eye muscle correction.       Anesthesia Type:  General    Note:  Disposition: Outpatient   Postop Pain Control: Uneventful            Sign Out: Well controlled pain   PONV: No   Neuro/Psych: Uneventful            Sign Out: Acceptable/Baseline neuro status   Airway/Respiratory: Uneventful            Sign Out: Acceptable/Baseline resp. status   CV/Hemodynamics: Uneventful            Sign Out: Acceptable CV status; No obvious hypovolemia; No obvious fluid overload   Other NRE: NONE   DID A NON-ROUTINE EVENT OCCUR? No           Last vitals:  Vitals Value Taken Time   /76 09/11/23 1035   Temp 36.2  C (97.1  F) 09/11/23 1035   Pulse 49 09/11/23 1035   Resp 9 09/11/23 1035   SpO2 98 % 09/11/23 1035       Electronically Signed By: Avelino Vásquez MD  September 11, 2023  11:44 AM

## 2023-09-11 NOTE — DISCHARGE INSTRUCTIONS
[facility]  Home Care Following Anesthesia  For 24 hours after surgery:  Get plenty of rest.  A responsible adult must stay with you for at least 24 hours after you leave the surgery center.  Do not drive or use heavy equipment.  If you have weakness or tingling, don't drive or use heavy equipment until this feeling goes away.   Do not drink alcohol.   Avoid strenuous or risky activities.  Ask for help when climbing stairs.  You may feel lightheaded.  IF so, sit for a few minutes before standing.  Have someone help you get up.   If you have nausea (feel sick to your stomach): Drink only clear liquids such as apple juice, ginger ale, broth or 7-Up.  Rest may also help.  Be sure to drink enough fluids.  Move to a regular diet as you feel able.   You may have a slight fever.  Call the doctor if your fever is over 100 F (37.7 C) (taken under the tongue) or lasts longer than 24 hours.  You may have a dry mouth, a sore throat, muscle aches or trouble sleeping. These should go away after 24 hours.  Do not make important or legal decisions.   It is recommended to avoid smoking.               Tips for taking pain medications  To get the best pain relief possible, remember these points:  Take pain medications as directed, before pain becomes severe.  Pain medication can upset your stomach: taking it with food may help.  Constipation is a common side effect of pain medication. Drink plenty of  fluids.  Eat foods high in fiber. Take a stool softener if recommended by your doctor or pharmacist.  Do not drink alcohol, drive or operate machinery while taking pain medications.  Ask about other ways to control pain, such as with heat, ice or relaxation.    Tylenol/Acetaminophen Consumption    If you feel your pain relief is insufficient, you may take Tylenol/Acetaminophen in addition to your narcotic pain medication.   Be careful not to exceed 4,000 mg of Tylenol/Acetaminophen in a 24 hour  period from all sources.  If you are taking extra strength Tylenol/acetaminophen (500 mg), the maximum dose is 8 tablets in 24 hours.  If you are taking regular strength acetaminophen (325 mg), the maximum dose is 12 tablets in 24 hours.    Call a doctor for any of the following:  Signs of infection (fever, growing tenderness at the surgery site, a large amount of drainage or bleeding, severe pain, foul-smelling drainage, redness, swelling).  It has been over 8 to 10 hours since surgery and you are still not able to urinate (pass water).  Headache for over 24 hours.  Numbness, tingling or weakness the day after surgery (if you had spinal anesthesia).  Signs of Covid-19 infection (temperature over 100 degrees, shortness of breath, cough, loss of taste/smell, generalized body aches, persistent headache, chills, sore throat, nausea/vomiting/diarrhea)  Your doctor is:       Dr. Keny Howe, Ophthalmology: [phone]               Or dial [phone] and ask for the resident on call for:  Ophthalmology  For emergency care, call the:  [facility]:  [phone] (TTY for hearing impaired: [phone])

## 2023-09-14 ENCOUNTER — NURSE TRIAGE (OUTPATIENT)
Dept: NURSING | Facility: CLINIC | Age: 38
End: 2023-09-14

## 2023-09-14 ENCOUNTER — TELEPHONE (OUTPATIENT)
Dept: OPHTHALMOLOGY | Facility: CLINIC | Age: 38
End: 2023-09-14
Payer: COMMERCIAL

## 2023-09-14 NOTE — TELEPHONE ENCOUNTER
Spoke to patient at 1555    S/p bilateral strabismus surgery 9-    Pt with worsening right eye pain last night and toay-- pain around eye and headache pain in frontal lobe area    More pain with eye movement    Vision stable    More redness in right eye in past two days.    Left eye doing good.    Pt using tylenol and not helping (not able to use ibuprofen)    Pt using jamison/poly/dex ointment 3/day each eye    [facility] and will review plan of care with Dr. Carley Ritter, RN 4:04 PM 09/14/23

## 2023-09-14 NOTE — TELEPHONE ENCOUNTER
Reviewed with Dr. Howe     Right eye symptoms confirmed (images received in mychart superimposed)     Pt to continue tylenol and aware to not take more than 4 grams in 24 hour period. Pt may use 500-1000mg every 4-6 hours.    Ok to start prednisolone eye drop in right eye 4 times a day and also continue the jamison/poly/dex ointment 3 days    Dr. Tsang will f/U with pt tomorrow and if still symptomatic to come into clinic    Updated pt and pt verbally demonstrated understanding and seemed comfortable with plan.    Mauricio Ritter RN 4:41 PM 09/14/23

## 2023-09-14 NOTE — TELEPHONE ENCOUNTER
Documenting in red flag triage encounter 9-    Mauricio Ritter RN 3:56 PM 09/14/23          [facility]    Phone Message    May a detailed message be left on voicemail: yes     Reason for Call: Symptoms or Concerns     If patient has red-flag symptoms, warm transfer to triage line    Current symptom or concern: Right eye pain and migraine.    Symptoms have been present for: last night    Has patient previously been seen for this? No    By :     Date:     Are there any new or worsening symptoms? No    Action Taken: Message routed to:  [facility] ([facility] Ophthalmology    Travel Screening: Not Applicable

## 2023-09-14 NOTE — TELEPHONE ENCOUNTER
Nurse Triage SBAR    Is this a 2nd Level Triage? Yes     Situation:  Right eye pain after surgery    Background/Assessment:     On 9/11/2023 Pt has bilaterally eye surgery.    Pt reporting having severe right eye pain.  Very red, swollen, and sensitive to light.   Hurts to open or close the eye.    Pt has a severe migraine headache behind the right eye.    Pt would like a call back from the clinic.    Yenny Graves RN  Central Triage Red Flags/Med Refills      Protocol Recommended Disposition:   Go To Office Now    Reason for Disposition   Eye pain/discomfort and more than mild    Additional Information   Negative: Followed an eye injury   Negative: Eye pain from chemical in the eye   Negative: Eye pain from foreign body in eye   Negative: Has sinus pain or pressure   Negative: Severe eye pain   Negative: Complete loss of vision in one or both eyes   Negative: Eyelids are very swollen (shut or almost) and fever   Negative: Eyelid (outer) is very red and fever   Negative: Foreign body sensation ('feels like something is in there') and irrigation didn't help   Negative: Vomiting   Negative: Ulcer or sore seen on the cornea (clear center part of the eye)   Negative: Recent eye surgery and increasing eye pain   Negative: Blurred vision and new or worsening   Negative: Patient sounds very sick or weak to the triager    Protocols used: Eye Pain-A-[place]

## 2023-09-14 NOTE — TELEPHONE ENCOUNTER
Caller reporting the following red-flag symptom(s): Eye pain after surgery    Per the system red-flag symptom policy, patient was instructed to:  speak with a Registered Nurse    Action:  Patient warm transferred to a Registered NurseFara

## 2023-09-15 ENCOUNTER — TELEPHONE (OUTPATIENT)
Dept: OPHTHALMOLOGY | Facility: CLINIC | Age: 38
End: 2023-09-15
Payer: COMMERCIAL

## 2023-09-15 ENCOUNTER — OFFICE VISIT (OUTPATIENT)
Dept: OPHTHALMOLOGY | Facility: CLINIC | Age: 38
End: 2023-09-15
Attending: OPHTHALMOLOGY
Payer: COMMERCIAL

## 2023-09-15 DIAGNOSIS — Z98.890 POSTOPERATIVE EYE STATE: Primary | ICD-10-CM

## 2023-09-15 PROCEDURE — 99024 POSTOP FOLLOW-UP VISIT: CPT | Mod: GC | Performed by: OPHTHALMOLOGY

## 2023-09-15 PROCEDURE — G0463 HOSPITAL OUTPT CLINIC VISIT: HCPCS | Performed by: OPHTHALMOLOGY

## 2023-09-15 RX ORDER — MOXIFLOXACIN 5 MG/ML
1 SOLUTION/ DROPS OPHTHALMIC 4 TIMES DAILY
Qty: 3 ML | Refills: 0 | Status: ON HOLD | OUTPATIENT
Start: 2023-09-15 | End: 2023-09-19

## 2023-09-15 ASSESSMENT — TONOMETRY
OS_IOP_MMHG: 13
IOP_METHOD: ICARE
OD_IOP_MMHG: 20

## 2023-09-15 ASSESSMENT — SLIT LAMP EXAM - LIDS
COMMENTS: NORMAL
COMMENTS: NORMAL

## 2023-09-15 ASSESSMENT — REFRACTION_WEARINGRX
OD_SPHERE: -1.75
OD_AXIS: 085
SPECS_TYPE: SVL
OS_CYLINDER: +1.50
OD_CYLINDER: +1.00
OS_AXIS: 088
OS_SPHERE: -3.00

## 2023-09-15 ASSESSMENT — CONF VISUAL FIELD
OS_SUPERIOR_NASAL_RESTRICTION: 0
OS_INFERIOR_TEMPORAL_RESTRICTION: 0
OD_NORMAL: 1
METHOD: COUNTING FINGERS
OD_INFERIOR_NASAL_RESTRICTION: 0
OS_SUPERIOR_TEMPORAL_RESTRICTION: 0
OD_SUPERIOR_NASAL_RESTRICTION: 0
OD_SUPERIOR_TEMPORAL_RESTRICTION: 0
OD_INFERIOR_TEMPORAL_RESTRICTION: 0
OS_NORMAL: 1
OS_INFERIOR_NASAL_RESTRICTION: 0

## 2023-09-15 ASSESSMENT — EXTERNAL EXAM - LEFT EYE: OS_EXAM: NORMAL

## 2023-09-15 ASSESSMENT — CUP TO DISC RATIO
OD_RATIO: 0.1
OS_RATIO: 0.1

## 2023-09-15 ASSESSMENT — VISUAL ACUITY
OS_CC+: -2
OS_CC: 20/30
OD_CC: 20/60
METHOD: SNELLEN - LINEAR

## 2023-09-15 ASSESSMENT — EXTERNAL EXAM - RIGHT EYE: OD_EXAM: NORMAL

## 2023-09-15 NOTE — PROGRESS NOTES
POD#5 s/p strabismus surgery:     Surgery 9/11/23:    PREOPERATIVE DIAGNOSIS (ES):  Large angle alternating esotropia     POSTOPERATIVE DIAGNOSIS (ES):  Same     NAME OF OPERATION:  Right medial rectus resection 9.0 mm  Left medial rectus resection 9.0 mm    Alignment: XT8   Diplopia: none     Patient having swelling, pain, and redness OD only. Patient is having constant tearing but no purulent discharge. Patient started steroid drop yesterday but did not help swelling or pain. Pain is worse with eye movement.   Maxitrol ophthalmic ointment: Continue     Patient has significant chemosis nasally in the right eye in a region we did not operate and increasing pain along with some mild mucoid discharge on exam.  I am very concerned for early infection post strabismus surgery.    Plan:  Vigamox: four times a day in the right eye   Prednisolone acetate 1%: four times a day in the right eye , maxitrol three times a day in the left eye   Augmentin 875/125 twice a day x 10 days starting today  Photos taken today of right eye- patient advised to call us if pain worsens, vision worsens in the right eye, or worsening redness / purulent discharge.  If he worsens we may need to admit him for IV antibiotics and cultures.     Complete documentation of historical and exam elements from today's encounter can be found in the full encounter summary report (not reduplicated in this progress note).  I personally obtained the chief complaint(s) and history of present illness.  I confirmed and edited as necessary the review of systems, past medical/surgical history, family history, social history, and examination findings as documented by others; and I examined the patient myself.  I personally reviewed the relevant tests, images, and reports as documented above.  I formulated and edited as necessary the assessment and plan and discussed the findings and management plan with the patient and family     MD Dilcia Figueredo  MD Steven   Fellow, Neuro-Ophthalmology

## 2023-09-16 NOTE — TELEPHONE ENCOUNTER
Received follow up page/phone call from patient's spouse that they had gone to local ED and pain had improved despite not being seen or given any medications. The pain had improved to the point that they felt comfortable with observation.     Offered to see the patient today if symptoms worsened or any concerns arose.     Thaddeus Krishnamurthy MD  Resident Physician, PGY-2  Department of Ophthalmology

## 2023-09-16 NOTE — TELEPHONE ENCOUNTER
Telephone Note    I was contacted by the patient and his wife regarding this patient on 9/15/23. The following information was obtained via phone call with this provider.      Patient is a 38-year-old male who presents with continued right eye pain and redness after strabismus surgery on 9/11/23. No change in vision. He was seen earlier today for the same symptoms and started on moxifloxacin and augmentin.      Based upon the reported findings I offered to see the the patient in the emergency department today. As alternatives, I also offered the patient follow up with the eye clinic in the morning 9/16/23 pending patient comfort/no change in vision developed. After communication the patient and the patient's wife elected to be seen in the emergency department today.      Will await page that patient has arrived for evaluation.     Thaddeus Krishnamurthy MD  Resident Physician, PGY-2  Department of Ophthalmology

## 2023-09-17 ENCOUNTER — TELEPHONE (OUTPATIENT)
Dept: FAMILY MEDICINE | Facility: CLINIC | Age: 38
End: 2023-09-17
Payer: COMMERCIAL

## 2023-09-17 ENCOUNTER — TELEPHONE (OUTPATIENT)
Dept: OPHTHALMOLOGY | Facility: CLINIC | Age: 38
End: 2023-09-17
Payer: COMMERCIAL

## 2023-09-17 ENCOUNTER — OFFICE VISIT (OUTPATIENT)
Dept: OPHTHALMOLOGY | Facility: CLINIC | Age: 38
End: 2023-09-17
Payer: COMMERCIAL

## 2023-09-17 ENCOUNTER — APPOINTMENT (OUTPATIENT)
Dept: CT IMAGING | Facility: CLINIC | Age: 38
DRG: 863 | End: 2023-09-17
Payer: COMMERCIAL

## 2023-09-17 ENCOUNTER — HOSPITAL ENCOUNTER (INPATIENT)
Facility: CLINIC | Age: 38
LOS: 2 days | Discharge: HOME | DRG: 863 | End: 2023-09-19
Attending: PEDIATRICS | Admitting: INTERNAL MEDICINE
Payer: COMMERCIAL

## 2023-09-17 DIAGNOSIS — H05.011 CELLULITIS OF RIGHT ORBITAL REGION: Primary | ICD-10-CM

## 2023-09-17 DIAGNOSIS — R56.9 SEIZURES (H): ICD-10-CM

## 2023-09-17 DIAGNOSIS — Z98.890 POSTOPERATIVE EYE STATE: ICD-10-CM

## 2023-09-17 DIAGNOSIS — H05.011 ORBITAL CELLULITIS ON RIGHT: Primary | ICD-10-CM

## 2023-09-17 DIAGNOSIS — H50.05 ALTERNATING ESOTROPIA: ICD-10-CM

## 2023-09-17 PROBLEM — H44.001 EYE INFECTION, RIGHT: Status: ACTIVE | Noted: 2023-09-17

## 2023-09-17 LAB
ANION GAP SERPL CALCULATED.3IONS-SCNC: 11 MMOL/L (ref 7–15)
BASOPHILS # BLD AUTO: 0.1 10E3/UL (ref 0–0.2)
BASOPHILS NFR BLD AUTO: 1 %
BUN SERPL-MCNC: 12.3 MG/DL (ref 6–20)
CALCIUM SERPL-MCNC: 9.4 MG/DL (ref 8.6–10)
CHLORIDE SERPL-SCNC: 107 MMOL/L (ref 98–107)
CREAT SERPL-MCNC: 0.98 MG/DL (ref 0.67–1.17)
CRP SERPL-MCNC: 4.12 MG/L
DEPRECATED HCO3 PLAS-SCNC: 24 MMOL/L (ref 22–29)
EGFRCR SERPLBLD CKD-EPI 2021: >90 ML/MIN/1.73M2
EOSINOPHIL # BLD AUTO: 0.2 10E3/UL (ref 0–0.7)
EOSINOPHIL NFR BLD AUTO: 2 %
ERYTHROCYTE [DISTWIDTH] IN BLOOD BY AUTOMATED COUNT: 13.4 % (ref 10–15)
GLUCOSE SERPL-MCNC: 94 MG/DL (ref 70–99)
HCT VFR BLD AUTO: 42.8 % (ref 40–53)
HGB BLD-MCNC: 14.2 G/DL (ref 13.3–17.7)
IMM GRANULOCYTES # BLD: 0 10E3/UL
IMM GRANULOCYTES NFR BLD: 0 %
LYMPHOCYTES # BLD AUTO: 2 10E3/UL (ref 0.8–5.3)
LYMPHOCYTES NFR BLD AUTO: 26 %
MCH RBC QN AUTO: 31.2 PG (ref 26.5–33)
MCHC RBC AUTO-ENTMCNC: 33.2 G/DL (ref 31.5–36.5)
MCV RBC AUTO: 94 FL (ref 78–100)
MONOCYTES # BLD AUTO: 0.6 10E3/UL (ref 0–1.3)
MONOCYTES NFR BLD AUTO: 7 %
NEUTROPHILS # BLD AUTO: 4.8 10E3/UL (ref 1.6–8.3)
NEUTROPHILS NFR BLD AUTO: 64 %
NRBC # BLD AUTO: 0 10E3/UL
NRBC BLD AUTO-RTO: 0 /100
PLATELET # BLD AUTO: 180 10E3/UL (ref 150–450)
POTASSIUM SERPL-SCNC: 4.1 MMOL/L (ref 3.4–5.3)
RBC # BLD AUTO: 4.55 10E6/UL (ref 4.4–5.9)
SODIUM SERPL-SCNC: 142 MMOL/L (ref 136–145)
WBC # BLD AUTO: 7.5 10E3/UL (ref 4–11)

## 2023-09-17 PROCEDURE — 87205 SMEAR GRAM STAIN: CPT

## 2023-09-17 PROCEDURE — 85025 COMPLETE CBC W/AUTO DIFF WBC: CPT

## 2023-09-17 PROCEDURE — 258N000003 HC RX IP 258 OP 636: Performed by: PEDIATRICS

## 2023-09-17 PROCEDURE — 250N000013 HC RX MED GY IP 250 OP 250 PS 637: Performed by: PEDIATRICS

## 2023-09-17 PROCEDURE — 87075 CULTR BACTERIA EXCEPT BLOOD: CPT

## 2023-09-17 PROCEDURE — 250N000011 HC RX IP 250 OP 636: Performed by: PEDIATRICS

## 2023-09-17 PROCEDURE — 36415 COLL VENOUS BLD VENIPUNCTURE: CPT

## 2023-09-17 PROCEDURE — 250N000009 HC RX 250

## 2023-09-17 PROCEDURE — 70481 CT ORBIT/EAR/FOSSA W/DYE: CPT | Mod: 26 | Performed by: RADIOLOGY

## 2023-09-17 PROCEDURE — 250N000011 HC RX IP 250 OP 636: Mod: JZ

## 2023-09-17 PROCEDURE — 99207 PR SERVICE NOT STAFFED W/SUPERV PROV: CPT | Mod: GC | Performed by: STUDENT IN AN ORGANIZED HEALTH CARE EDUCATION/TRAINING PROGRAM

## 2023-09-17 PROCEDURE — 120N000002 HC R&B MED SURG/OB UMMC

## 2023-09-17 PROCEDURE — 99222 1ST HOSP IP/OBS MODERATE 55: CPT | Mod: FS

## 2023-09-17 PROCEDURE — 86140 C-REACTIVE PROTEIN: CPT

## 2023-09-17 PROCEDURE — 70481 CT ORBIT/EAR/FOSSA W/DYE: CPT

## 2023-09-17 PROCEDURE — 258N000003 HC RX IP 258 OP 636

## 2023-09-17 PROCEDURE — 99207 PR APP CREDIT; MD BILLING SHARED VISIT: CPT | Performed by: INTERNAL MEDICINE

## 2023-09-17 PROCEDURE — 82310 ASSAY OF CALCIUM: CPT

## 2023-09-17 PROCEDURE — 250N000013 HC RX MED GY IP 250 OP 250 PS 637

## 2023-09-17 RX ORDER — LAMOTRIGINE 100 MG/1
300 TABLET, ORALLY DISINTEGRATING ORAL 2 TIMES DAILY
COMMUNITY
End: 2024-01-23

## 2023-09-17 RX ORDER — SUMATRIPTAN 6 MG/.5ML
6 INJECTION, SOLUTION SUBCUTANEOUS
Status: DISCONTINUED | OUTPATIENT
Start: 2023-09-18 | End: 2023-09-19 | Stop reason: HOSPADM

## 2023-09-17 RX ORDER — HYDROXYZINE HYDROCHLORIDE 25 MG/1
25-50 TABLET, FILM COATED ORAL EVERY EVENING
Status: DISCONTINUED | OUTPATIENT
Start: 2023-09-17 | End: 2023-09-19 | Stop reason: HOSPADM

## 2023-09-17 RX ORDER — TRAZODONE HYDROCHLORIDE 50 MG/1
50-100 TABLET, FILM COATED ORAL
Status: DISCONTINUED | OUTPATIENT
Start: 2023-09-17 | End: 2023-09-19 | Stop reason: HOSPADM

## 2023-09-17 RX ORDER — ERYTHROMYCIN 5 MG/G
OINTMENT OPHTHALMIC 4 TIMES DAILY
Status: DISCONTINUED | OUTPATIENT
Start: 2023-09-17 | End: 2023-09-19

## 2023-09-17 RX ORDER — VITAMIN B COMPLEX
50 TABLET ORAL DAILY
Status: DISCONTINUED | OUTPATIENT
Start: 2023-09-18 | End: 2023-09-19 | Stop reason: HOSPADM

## 2023-09-17 RX ORDER — ACETAMINOPHEN 650 MG/1
650 SUPPOSITORY RECTAL EVERY 6 HOURS PRN
Status: DISCONTINUED | OUTPATIENT
Start: 2023-09-17 | End: 2023-09-19

## 2023-09-17 RX ORDER — IOPAMIDOL 755 MG/ML
100 INJECTION, SOLUTION INTRAVASCULAR ONCE
Status: COMPLETED | OUTPATIENT
Start: 2023-09-17 | End: 2023-09-17

## 2023-09-17 RX ORDER — SODIUM CHLORIDE 450 MG/100ML
INJECTION, SOLUTION INTRAVENOUS
Status: DISPENSED
Start: 2023-09-17 | End: 2023-09-18

## 2023-09-17 RX ORDER — LAMOTRIGINE 100 MG/1
300 TABLET, ORALLY DISINTEGRATING ORAL 2 TIMES DAILY
Status: DISCONTINUED | OUTPATIENT
Start: 2023-09-17 | End: 2023-09-19 | Stop reason: HOSPADM

## 2023-09-17 RX ORDER — HYDROXYZINE HYDROCHLORIDE 50 MG/1
50 TABLET, FILM COATED ORAL EVERY 6 HOURS PRN
Status: DISCONTINUED | OUTPATIENT
Start: 2023-09-17 | End: 2023-09-19 | Stop reason: HOSPADM

## 2023-09-17 RX ORDER — SERTRALINE HYDROCHLORIDE 100 MG/1
100 TABLET, FILM COATED ORAL 2 TIMES DAILY
Status: DISCONTINUED | OUTPATIENT
Start: 2023-09-17 | End: 2023-09-19 | Stop reason: HOSPADM

## 2023-09-17 RX ORDER — ONDANSETRON 4 MG/1
4 TABLET, ORALLY DISINTEGRATING ORAL EVERY 6 HOURS PRN
Status: DISCONTINUED | OUTPATIENT
Start: 2023-09-17 | End: 2023-09-19 | Stop reason: HOSPADM

## 2023-09-17 RX ORDER — MOXIFLOXACIN 5 MG/ML
1 SOLUTION/ DROPS OPHTHALMIC 4 TIMES DAILY
Status: DISCONTINUED | OUTPATIENT
Start: 2023-09-17 | End: 2023-09-19 | Stop reason: HOSPADM

## 2023-09-17 RX ORDER — HYDROXYZINE HYDROCHLORIDE 25 MG/1
25 TABLET, FILM COATED ORAL EVERY 6 HOURS PRN
Status: DISCONTINUED | OUTPATIENT
Start: 2023-09-17 | End: 2023-09-19 | Stop reason: HOSPADM

## 2023-09-17 RX ORDER — ONDANSETRON 2 MG/ML
4 INJECTION INTRAMUSCULAR; INTRAVENOUS EVERY 6 HOURS PRN
Status: DISCONTINUED | OUTPATIENT
Start: 2023-09-17 | End: 2023-09-19 | Stop reason: HOSPADM

## 2023-09-17 RX ORDER — ACETAMINOPHEN 325 MG/1
650 TABLET ORAL EVERY 6 HOURS PRN
Status: DISCONTINUED | OUTPATIENT
Start: 2023-09-17 | End: 2023-09-19 | Stop reason: HOSPADM

## 2023-09-17 RX ORDER — BUSPIRONE HYDROCHLORIDE 15 MG/1
30 TABLET ORAL 2 TIMES DAILY
Status: DISCONTINUED | OUTPATIENT
Start: 2023-09-17 | End: 2023-09-17

## 2023-09-17 RX ORDER — TOPIRAMATE 100 MG/1
100 TABLET, FILM COATED ORAL 2 TIMES DAILY
Status: DISCONTINUED | OUTPATIENT
Start: 2023-09-17 | End: 2023-09-19 | Stop reason: HOSPADM

## 2023-09-17 RX ORDER — SODIUM CHLORIDE 9 MG/ML
INJECTION, SOLUTION INTRAVENOUS
Status: COMPLETED
Start: 2023-09-17 | End: 2023-09-17

## 2023-09-17 RX ORDER — KETOROLAC TROMETHAMINE 30 MG/ML
30 INJECTION, SOLUTION INTRAMUSCULAR; INTRAVENOUS EVERY 6 HOURS PRN
Status: DISCONTINUED | OUTPATIENT
Start: 2023-09-17 | End: 2023-09-19 | Stop reason: HOSPADM

## 2023-09-17 RX ORDER — PREDNISOLONE ACETATE 10 MG/ML
1 SUSPENSION/ DROPS OPHTHALMIC 4 TIMES DAILY
Status: DISCONTINUED | OUTPATIENT
Start: 2023-09-17 | End: 2023-09-17

## 2023-09-17 RX ORDER — DIAZEPAM 10 MG/2ML
5 INJECTION, SOLUTION INTRAMUSCULAR; INTRAVENOUS
Status: DISCONTINUED | OUTPATIENT
Start: 2023-09-17 | End: 2023-09-19 | Stop reason: HOSPADM

## 2023-09-17 RX ORDER — LAMOTRIGINE 100 MG/1
100 TABLET ORAL DAILY
Status: DISCONTINUED | OUTPATIENT
Start: 2023-09-17 | End: 2023-09-17

## 2023-09-17 RX ORDER — LIDOCAINE 40 MG/G
CREAM TOPICAL
Status: DISCONTINUED | OUTPATIENT
Start: 2023-09-17 | End: 2023-09-19 | Stop reason: HOSPADM

## 2023-09-17 RX ORDER — CEFUROXIME AXETIL 250 MG/1
6 TABLET ORAL
Status: DISCONTINUED | OUTPATIENT
Start: 2023-09-18 | End: 2023-09-17

## 2023-09-17 RX ORDER — PIPERACILLIN SODIUM, TAZOBACTAM SODIUM 3; .375 G/15ML; G/15ML
3.38 INJECTION, POWDER, LYOPHILIZED, FOR SOLUTION INTRAVENOUS EVERY 6 HOURS
Status: DISCONTINUED | OUTPATIENT
Start: 2023-09-17 | End: 2023-09-19

## 2023-09-17 RX ORDER — HYDROXYZINE HYDROCHLORIDE 25 MG/1
25 TABLET, FILM COATED ORAL EVERY EVENING
Status: DISCONTINUED | OUTPATIENT
Start: 2023-09-17 | End: 2023-09-17

## 2023-09-17 RX ADMIN — LAMOTRIGINE 300 MG: 100 TABLET, ORALLY DISINTEGRATING ORAL at 21:16

## 2023-09-17 RX ADMIN — ERYTHROMYCIN 1 G: 5 OINTMENT OPHTHALMIC at 21:16

## 2023-09-17 RX ADMIN — SODIUM CHLORIDE 50 ML: 9 INJECTION, SOLUTION INTRAVENOUS at 20:44

## 2023-09-17 RX ADMIN — MOXIFLOXACIN 1 DROP: 5 SOLUTION/ DROPS OPHTHALMIC at 21:16

## 2023-09-17 RX ADMIN — SODIUM CHLORIDE 500 ML: 9 INJECTION, SOLUTION INTRAVENOUS at 21:09

## 2023-09-17 RX ADMIN — VANCOMYCIN HYDROCHLORIDE 1250 MG: 10 INJECTION, POWDER, LYOPHILIZED, FOR SOLUTION INTRAVENOUS at 22:01

## 2023-09-17 RX ADMIN — SERTRALINE HYDROCHLORIDE 100 MG: 100 TABLET ORAL at 21:16

## 2023-09-17 RX ADMIN — IOPAMIDOL 67 ML: 755 INJECTION, SOLUTION INTRAVENOUS at 20:38

## 2023-09-17 RX ADMIN — TOPIRAMATE 100 MG: 100 TABLET, FILM COATED ORAL at 21:16

## 2023-09-17 RX ADMIN — KETOROLAC TROMETHAMINE 30 MG: 30 INJECTION, SOLUTION INTRAMUSCULAR at 23:38

## 2023-09-17 RX ADMIN — PIPERACILLIN AND TAZOBACTAM 3.38 G: 3; .375 INJECTION, POWDER, LYOPHILIZED, FOR SOLUTION INTRAVENOUS at 21:08

## 2023-09-17 RX ADMIN — HYDROXYZINE HYDROCHLORIDE 50 MG: 25 TABLET, FILM COATED ORAL at 21:16

## 2023-09-17 ASSESSMENT — CUP TO DISC RATIO
OD_RATIO: 0.1
OS_RATIO: 0.1

## 2023-09-17 ASSESSMENT — VISUAL ACUITY
METHOD: SNELLEN - LINEAR
OD_CC: 20/70 PH 20/40
OS_CC: 20/30

## 2023-09-17 ASSESSMENT — TONOMETRY
IOP_METHOD: TONOPEN
OS_IOP_MMHG: 12
OD_IOP_MMHG: 16

## 2023-09-17 ASSESSMENT — SLIT LAMP EXAM - LIDS: COMMENTS: NORMAL

## 2023-09-17 ASSESSMENT — EXTERNAL EXAM - LEFT EYE: OS_EXAM: NORMAL

## 2023-09-17 ASSESSMENT — CONF VISUAL FIELD
OD_SUPERIOR_TEMPORAL_RESTRICTION: 0
OD_INFERIOR_NASAL_RESTRICTION: 0
OD_SUPERIOR_NASAL_RESTRICTION: 0
OD_INFERIOR_TEMPORAL_RESTRICTION: 0
OD_NORMAL: 1

## 2023-09-17 ASSESSMENT — EXTERNAL EXAM - RIGHT EYE: OD_EXAM: NORMAL

## 2023-09-17 ASSESSMENT — ACTIVITIES OF DAILY LIVING (ADL)
ADLS_ACUITY_SCORE: 35

## 2023-09-17 NOTE — PHARMACY-VANCOMYCIN DOSING SERVICE
"Pharmacy Vancomycin Initial Note  Date of Service 2023  Patient's  1985  38 year old, male    Indication: Abscess    Current estimated CrCl = Estimated Creatinine Clearance: 126.4 mL/min (based on SCr of 0.98 mg/dL).    Creatinine for last 3 days  2023:  4:39 PM Creatinine 0.98 mg/dL    Recent Vancomycin Level(s) for last 3 days  No results found for requested labs within last 3 days.      Vancomycin IV Administrations (past 72 hours)        No vancomycin orders with administrations in past 72 hours.                    Nephrotoxins and other renal medications (From now, onward)      Start     Dose/Rate Route Frequency Ordered Stop    23 1700  piperacillin-tazobactam (ZOSYN) 3.375 g vial to attach to  mL bag        Note to Pharmacy: For [facility], [facility] and [facility]: For Zosyn-naive patients, use the \"Zosyn initial dose + extended infusion\" order panel.    3.375 g  over 30 Minutes Intravenous EVERY 6 HOURS 23 1638      23 1648  ketorolac (TORADOL) injection 30 mg         30 mg Intravenous EVERY 6 HOURS PRN 23 1649 23 1647            Contrast Orders - past 72 hours (72h ago, onward)      None            [employer] Prediction of Planned Initial Vancomycin Regimen  Loading dose: N/A  Regimen: 1250 mg IV every 12 hours.  Start time: 17:23 on 2023  Exposure target: AUC24 (range)400-600 mg/L.hr   AUC24,ss: 453 mg/L.hr  Probability of AUC24 > 400: 64 %  Ctrough,ss: 14.3 mg/L  Probability of Ctrough,ss > 20: 22 %  Probability of nephrotoxicity (Lodise [place] ): 9 %        Plan:  Start vancomycin 1250 mg IV q12h.   Vancomycin monitoring method: AUC  Vancomycin therapeutic monitoring goal: 400-600 mg*h/L  Pharmacy will check vancomycin levels as appropriate in 1-3 Days.    Serum creatinine levels will be ordered daily for the first week of therapy and at least twice weekly for subsequent weeks.      Twila Davey [facility]    "

## 2023-09-17 NOTE — PROGRESS NOTES
Ophthalmology Acute Clinic     HPI:   Javi Brown is a 38 year old male who presents for right eye pain, yellow discharge and eyelid edema s/p bilateral strabismus surgery 9/11/23.    He has noted increasing redness, edema and pain since Thursday, saw Dr. Howe Friday who started moxifloxacin and amoxicillin. He noticed worsening pain, redness, and discharge again on Saturday.     Headache has improved with imitrix. Some photophobia that has also improved with imitrix. No fever or chills.    Past Ocular history:   - Glasses:Yes   - Contact lens wear: None currently   - Ocular Surgical History: 9/11/23 bilateral medial rectus resection  - Current Eye drops: QID moxifloxacin and Pred, Amoxicillin BID     PMH: seizure disorder     FH: No FH glaucoma or AMD.     Review of systems for the eyes was negative other than the pertinent positives/negatives listed in the HPI.      Imaging:   NA    Assessment & Plan      Javi Brown is a 38 year old male with the following diagnoses:   1. Cellulitis of right orbital region    He developed right eye pain, redness and purulent chemosis concerning for infection post-op from strabismus surgery on 9/11/23 that did not respond to oral and topical antibiotics. He has been on amoxicillin-clavulanate since 9/15/23. Collected cultures 9/17/23 in clinic initially attempted with aspiration but no meaningful volume of fluid could be aspirated safely. Serous fluid was instead drained from the opening made by the attempted aspiration, collected with swabs and delivered to the lab for cultures.   - Admit to medicine for orbital cellulitis with IV zosyn and vancomycin for orbital cellulitis.  - CT orbits with contrast   - Consult ID  - Cultured purulent material from right conjunctiva as above.   - Aerobic, anaerobic and gram stain collected 9/17/23  - Ophthalmology will follow daily    Patient seen with Dr. Mcclain and plan discussed with Dr. Steven Travis  Raghu MCCLOUD  Resident Physician, PGY-2  Department of Ophthalmology  09/17/23 12:36 PM      I have not seen or examined the patient, but was available if the need had arisen. I have reviewed the chart and key elements of this encounter and I concur with the resident's assessment and plan:      Please direct all questions or concerns to the on-call ophthalmology resident.    Dilcia Harris MD   Fellow, Neuro-ophthalmology   Instructor, Comprehensive Ophthalmology

## 2023-09-17 NOTE — TELEPHONE ENCOUNTER
[facility]  Transfer Triage Note    Date of call: 23  Time of call: 2:43 PM    Reason for transfer: Patient has established care here  Further diagnostic work up, management, and consultation for specialized care   Diagnosis:     R orbital cellultiis    Outside Records: Available. Additional records requested to be faxed to [phone].    Stability of Patient: Patient is vitally stable, with no critical labs, and will likely remain stable throughout the transfer process  ICU: No    We received a phone call through our Physician Access line from Dr. Krishnamurthy at [facility] Optho clini clinic.  My understanding from this phone call is that Javi Brown with  1985 is a 38 year old male with recent R eye surgery for Right eye strabismus found to have purluent chemosis post-operatively concerning for Right orbital cellulitis  Plan for admission to [facility] Hospitalist team with Optho and ID consult  Per Optho attending, empiric IV zosyn and vanc for now and CT orbits upon arrival to [facility].  Bed held on 5 ortho on [facility], Dr Krishnamurthy will inform patient and ensure patient can make it over to [facility] . Transfer Accepted? Yes  Additional Comments     Recommendations for Management and Stabilization: Not needed  Sending facility plans to transport patient via ambulance: No  Expected Time of Arrival for Transfer: next 1-8 hrs  Arrival Location:  [facility]. [address] ortho       Parish Willard MD

## 2023-09-17 NOTE — TELEPHONE ENCOUNTER
Telephone Note    I was contacted by Marcell ESTRELLA from [facility] in [facility] regarding this patient on 9/17/23. The following information was obtained via phone call with this provider.      Patient is a 38-year-old male who presents with worsening post-op pain.     The plan was to see the patient at the [facility] today, however the patient thought the appointment was for tomorrow 9/18/23 which is why they presented to the ED today.      Pending the consulting provider's assessment of stability I offered to see the patient in the [facility] urgent clinic or ED today.      Following communication the provider selected follow up at the [facility] today 9/17/23 as planned.    Thaddeus Krishnamurthy MD  Resident Physician, PGY-2  Department of Ophthalmology

## 2023-09-17 NOTE — TELEPHONE ENCOUNTER
"Telephone Note    I was contacted by the patient's wife regarding this patient.      Patient is a 38-year-old M who presents with pain, swelling, and some serosanguinous discharge after strabismus surgery.     Swelling, discoloration, and \"yellowing\" of skin overlying the right eye  Describes redness over the eyelid that is creeping  Does not have changes in vision or limitations in Extraocular movements  Informed the patient that this did not sound like an infection but that it was most likely postoperative pain and inflammation. Chart review demonstrated no concern for infection on post op day one. Patient has had multiple telephone interactions since that time.     Patient's past ocular history:   Strabismus surgery 09/14/23    Patient's past medical history:   Current Outpatient Medications   Medication    acetaminophen 500 MG CAPS    amoxicillin-clavulanate (AUGMENTIN) 875-125 MG tablet    busPIRone HCl (BUSPAR) 30 MG tablet    cyanocobalamin (VITAMIN B-12) 1000 MCG SUBL sublingual tablet    diazepam (DIAZEPAM INTENSOL) 5 MG/ML (HIGH CONC) solution    diazepam (DIAZEPAM INTENSOL) 5 MG/ML (HIGH CONC) solution    galcanezumab-gnlm (EMGALITY) 120 MG/ML injection    hydrOXYzine (VISTARIL) 25 MG capsule    lamoTRIgine (LAMICTAL) 100 MG tablet    lamoTRIgine (LAMICTAL) 100 MG TBDP ODT tab    moxifloxacin (VIGAMOX) 0.5 % ophthalmic solution    ondansetron (ZOFRAN ODT) 4 MG ODT tab    ondansetron (ZOFRAN) 4 MG tablet    ONDANSETRON PO    Pediatric Multi Vit-Extra C-FA (FLINTSTONES/EXTRA C) CHEW    prednisoLONE acetate (PRED FORTE) 1 % ophthalmic suspension    sertraline (ZOLOFT) 100 MG tablet    SUMAtriptan (IMITREX STATDOSE) 6 MG/0.5ML pen injector kit    topiramate (TOPAMAX) 100 MG tablet    traZODone (DESYREL) 50 MG tablet    vitamin D2 (ERGOCALCIFEROL) 15914 units (1250 mcg) capsule    vitamin D3 (CHOLECALCIFEROL) 50 mcg (2000 units) tablet     No current facility-administered medications for this visit.     Given " the patient's high level of concern for infectious symptoms and endorsement of redness that is worsening in the skin overlying the eye as well as general inflammation of the eye, I offered a clinic visit for Sunday at 9:30 AM with Dr. Krishnamurthy at the [facility].    I emphasized the importance of ophthalmology evaluation and I offered to see the patient in the ED today. I conveyed that if there was any concern about the patient's care or my suggestions, the patient should be sent to the ED for evaluation right away.      Following communication with the patient, the provider and the patient selected to be seen at the [facility]:    [facility], [address] [place] [address], [id number]  Please instruct patient to go to the 4th floor  Phone number: [phone]    Jaycob Bullard MD  Ophthalmology, PGY-2

## 2023-09-18 LAB
ANION GAP SERPL CALCULATED.3IONS-SCNC: 9 MMOL/L (ref 7–15)
BUN SERPL-MCNC: 15 MG/DL (ref 6–20)
CALCIUM SERPL-MCNC: 9.1 MG/DL (ref 8.6–10)
CHLORIDE SERPL-SCNC: 106 MMOL/L (ref 98–107)
CREAT SERPL-MCNC: 1.04 MG/DL (ref 0.67–1.17)
DEPRECATED HCO3 PLAS-SCNC: 25 MMOL/L (ref 22–29)
EGFRCR SERPLBLD CKD-EPI 2021: >90 ML/MIN/1.73M2
ERYTHROCYTE [DISTWIDTH] IN BLOOD BY AUTOMATED COUNT: 13.3 % (ref 10–15)
GLUCOSE SERPL-MCNC: 92 MG/DL (ref 70–99)
HCT VFR BLD AUTO: 40.7 % (ref 40–53)
HGB BLD-MCNC: 13.8 G/DL (ref 13.3–17.7)
MCH RBC QN AUTO: 31.7 PG (ref 26.5–33)
MCHC RBC AUTO-ENTMCNC: 33.9 G/DL (ref 31.5–36.5)
MCV RBC AUTO: 93 FL (ref 78–100)
PLATELET # BLD AUTO: 173 10E3/UL (ref 150–450)
POTASSIUM SERPL-SCNC: 4 MMOL/L (ref 3.4–5.3)
RBC # BLD AUTO: 4.36 10E6/UL (ref 4.4–5.9)
SODIUM SERPL-SCNC: 140 MMOL/L (ref 136–145)
WBC # BLD AUTO: 5.8 10E3/UL (ref 4–11)

## 2023-09-18 PROCEDURE — 80048 BASIC METABOLIC PNL TOTAL CA: CPT

## 2023-09-18 PROCEDURE — 250N000011 HC RX IP 250 OP 636

## 2023-09-18 PROCEDURE — 250N000013 HC RX MED GY IP 250 OP 250 PS 637

## 2023-09-18 PROCEDURE — 258N000003 HC RX IP 258 OP 636: Performed by: PEDIATRICS

## 2023-09-18 PROCEDURE — 85027 COMPLETE CBC AUTOMATED: CPT

## 2023-09-18 PROCEDURE — 250N000013 HC RX MED GY IP 250 OP 250 PS 637: Performed by: PEDIATRICS

## 2023-09-18 PROCEDURE — 99233 SBSQ HOSP IP/OBS HIGH 50: CPT | Performed by: PHYSICIAN ASSISTANT

## 2023-09-18 PROCEDURE — 120N000002 HC R&B MED SURG/OB UMMC

## 2023-09-18 PROCEDURE — 250N000011 HC RX IP 250 OP 636: Performed by: PEDIATRICS

## 2023-09-18 PROCEDURE — 250N000009 HC RX 250

## 2023-09-18 PROCEDURE — 36415 COLL VENOUS BLD VENIPUNCTURE: CPT

## 2023-09-18 PROCEDURE — 99222 1ST HOSP IP/OBS MODERATE 55: CPT | Performed by: STUDENT IN AN ORGANIZED HEALTH CARE EDUCATION/TRAINING PROGRAM

## 2023-09-18 RX ADMIN — HYDROXYZINE HYDROCHLORIDE 50 MG: 25 TABLET, FILM COATED ORAL at 21:27

## 2023-09-18 RX ADMIN — PIPERACILLIN AND TAZOBACTAM 3.38 G: 3; .375 INJECTION, POWDER, LYOPHILIZED, FOR SOLUTION INTRAVENOUS at 03:15

## 2023-09-18 RX ADMIN — MOXIFLOXACIN 1 DROP: 5 SOLUTION/ DROPS OPHTHALMIC at 07:47

## 2023-09-18 RX ADMIN — LAMOTRIGINE 300 MG: 100 TABLET, ORALLY DISINTEGRATING ORAL at 07:48

## 2023-09-18 RX ADMIN — PIPERACILLIN AND TAZOBACTAM 3.38 G: 3; .375 INJECTION, POWDER, LYOPHILIZED, FOR SOLUTION INTRAVENOUS at 21:28

## 2023-09-18 RX ADMIN — KETOROLAC TROMETHAMINE 30 MG: 30 INJECTION, SOLUTION INTRAMUSCULAR at 06:50

## 2023-09-18 RX ADMIN — Medication 50 MCG: at 07:44

## 2023-09-18 RX ADMIN — ERYTHROMYCIN 1 G: 5 OINTMENT OPHTHALMIC at 15:59

## 2023-09-18 RX ADMIN — ACETAMINOPHEN 650 MG: 325 TABLET, FILM COATED ORAL at 06:50

## 2023-09-18 RX ADMIN — MOXIFLOXACIN 1 DROP: 5 SOLUTION/ DROPS OPHTHALMIC at 21:28

## 2023-09-18 RX ADMIN — Medication 1000 MCG: at 07:49

## 2023-09-18 RX ADMIN — SERTRALINE HYDROCHLORIDE 100 MG: 100 TABLET ORAL at 07:44

## 2023-09-18 RX ADMIN — VANCOMYCIN HYDROCHLORIDE 1250 MG: 10 INJECTION, POWDER, LYOPHILIZED, FOR SOLUTION INTRAVENOUS at 22:38

## 2023-09-18 RX ADMIN — TOPIRAMATE 100 MG: 100 TABLET, FILM COATED ORAL at 21:27

## 2023-09-18 RX ADMIN — SERTRALINE HYDROCHLORIDE 100 MG: 100 TABLET ORAL at 21:28

## 2023-09-18 RX ADMIN — ERYTHROMYCIN 1 G: 5 OINTMENT OPHTHALMIC at 07:50

## 2023-09-18 RX ADMIN — MOXIFLOXACIN 1 DROP: 5 SOLUTION/ DROPS OPHTHALMIC at 15:59

## 2023-09-18 RX ADMIN — PIPERACILLIN AND TAZOBACTAM 3.38 G: 3; .375 INJECTION, POWDER, LYOPHILIZED, FOR SOLUTION INTRAVENOUS at 09:12

## 2023-09-18 RX ADMIN — TOPIRAMATE 100 MG: 100 TABLET, FILM COATED ORAL at 07:44

## 2023-09-18 RX ADMIN — ERYTHROMYCIN 1 G: 5 OINTMENT OPHTHALMIC at 21:28

## 2023-09-18 RX ADMIN — MOXIFLOXACIN 1 DROP: 5 SOLUTION/ DROPS OPHTHALMIC at 12:11

## 2023-09-18 RX ADMIN — PIPERACILLIN AND TAZOBACTAM 3.38 G: 3; .375 INJECTION, POWDER, LYOPHILIZED, FOR SOLUTION INTRAVENOUS at 15:22

## 2023-09-18 RX ADMIN — VANCOMYCIN HYDROCHLORIDE 1250 MG: 10 INJECTION, POWDER, LYOPHILIZED, FOR SOLUTION INTRAVENOUS at 10:28

## 2023-09-18 RX ADMIN — ERYTHROMYCIN 1 G: 5 OINTMENT OPHTHALMIC at 12:12

## 2023-09-18 RX ADMIN — LAMOTRIGINE 300 MG: 100 TABLET, ORALLY DISINTEGRATING ORAL at 21:28

## 2023-09-18 RX ADMIN — HYDROXYZINE HYDROCHLORIDE 50 MG: 50 TABLET, FILM COATED ORAL at 15:59

## 2023-09-18 ASSESSMENT — ACTIVITIES OF DAILY LIVING (ADL)
ADLS_ACUITY_SCORE: 20
ADLS_ACUITY_SCORE: 35
WALKING_OR_CLIMBING_STAIRS_DIFFICULTY: NO
TOILETING_ISSUES: NO
ADLS_ACUITY_SCORE: 35
ADLS_ACUITY_SCORE: 20
ADLS_ACUITY_SCORE: 35
ADLS_ACUITY_SCORE: 20
FALL_HISTORY_WITHIN_LAST_SIX_MONTHS: NO
ADLS_ACUITY_SCORE: 35
VISION_MANAGEMENT: SUNGLASSES
ADLS_ACUITY_SCORE: 20
DOING_ERRANDS_INDEPENDENTLY_DIFFICULTY: NO
CHANGE_IN_FUNCTIONAL_STATUS_SINCE_ONSET_OF_CURRENT_ILLNESS/INJURY: NO
DIFFICULTY_EATING/SWALLOWING: NO
ADLS_ACUITY_SCORE: 35
WEAR_GLASSES_OR_BLIND: YES
ADLS_ACUITY_SCORE: 20
ADLS_ACUITY_SCORE: 20
CONCENTRATING,_REMEMBERING_OR_MAKING_DECISIONS_DIFFICULTY: NO
DRESSING/BATHING_DIFFICULTY: NO
ADLS_ACUITY_SCORE: 35

## 2023-09-18 NOTE — H&P
[facility]    History and Physical - Hospitalist Service, GOLD TEAM        Date of Admission:  9/17/2023    Assessment & Plan      Javi Brown is a 38 year old male admitted on 9/17/2023. He has a past medical history of obesity status post bariatric surgery, seizures, migraine headaches, SUNDAY, MDD, ADHD, anxiety, pancreatitis.  Admitted 9/17/2023 for ophthalmology consult and IV antibiotics for worsening right eye infection.    #Right eye cellulitis versus  #S/p strabismus surgery b/l eyes 9/11/23  Patient with history of congenital strabismus s/p strabismus surgery around 1-2 years of age.  Patient has a longstanding residual esotropia that had slowly increased in angle over time.  Presented to ophthalmologist 9/15 with swelling, pain, redness in the right eye.  Was having constant tearing but at that time no purulent drainage.  Was started on steroid drops and Augmentin.  Needle biopsy done at that time, cultures pending.  Was instructed to go to ED at [facility] if symptoms worsened.  Normal white blood cell count and CRP.  Discussed with ophthalmology and appreciate recommendations.  -IV Zosyn and vancomycin  -Pharmacy to dose vancomycin  -CT of orbits with contrast pending   -BMP, CBC in AM  -Infectious disease consult  -Ophthalmology consult  -Continue moxifloxacin and prednisolone eyedrops  -APAP oral as needed pain  -Toradol as needed pain (cannot take ibuprofen with previous bypass surgery)    #Migraines  Patient has had migraines since he was a toddler.Currently does have a migraine he rates as 5/10 for pain.  Follows a neurologist for migraines and seizures.  Missed the last dose of Emgality that he takes every 28 days.  -Continue PTA topiramate scheduled  -Continue PTA sumatriptan 6 mg subcu as needed  -APAP PRN     #Seizures  Has not had a grand mal seizure for 1-1/2 to 2 years.  States he occasionally has a focal seizure where he will just stare off into  "space.  -Diazepam 5 mg IV as needed for a seizure  -Notify medicine if patient has seizure activity    #Obesity s/p bariatric surgery  -Continue PTA vitamin B12, vitamin D3  -Resume pediatric multivitamin at discharge  -Per patient okay for regular diet    #SUNDAY  #MDD  -Continue PTA sertraline, lamotrigine, hydroxyzine scheduled  -Hydroxyzine as needed       Diet: Combination Diet Regular Diet Adult    DVT Prophylaxis: Ambulate every shift  Greer Catheter: Not present  Lines: None     Cardiac Monitoring: None  Code Status: Full Code      Clinically Significant Risk Factors Present on Admission                       # Obesity: Estimated body mass index is 35.87 kg/m  as calculated from the following:    Height as of 9/11/23: 1.778 m (5' 10\").    Weight as of 9/11/23: 113.4 kg (250 lb).              Disposition Plan      Expected Discharge Date: 09/21/2023                The patient's care was discussed with the Attending Physician, Dr. Terry, Ophthalmologist, patient, patient spouse .    [facility]  Hospitalist Service, [facility]  Securely message with Vocera (more info)  Text page via [facility] Paging/Directory   See signed in provider for up to date coverage information    ______________________________________________________________________    Chief Complaint   R eye pain    History is obtained from the patient and electronic health record    History of Present Illness   Javi Brown is a 38 year old male admitted on 9/17/2023. He has a past medical history of obesity status post bariatric surgery, seizures, migraine headaches, SUNDAY, MDD, ADHD, anxiety, pancreatitis.  Admitted 9/17/2023 for ophthalmology consult and IV antibiotics for worsening right eye infection.  On 9/11, patient had strabismus repair of bilateral eyes.  Had follow-up 2 days ago with his surgeon.  At that time he was having increasing pain and drainage from the right eye and " was started on oral antibiotics and steroids.  Over the course of the last 2 days he has had increased drainage, pain and is unsure about his vision since his right eyelid is difficult to keep open.  He was advised to come to the ED today if symptoms are not better.  At clinic visit, needle biopsy was done and cultures are pending.  Currently has mild pain in the right eye.  He also has a migraine he rates as 5/10 for pain.  Clear discharge coming out of right eye.  Difficult for patient to open right eye fully.  Understands plan of care for now including CT, IV antibiotics, infectious disease consult, ophthalmology consult.  Currently afebrile.  Denies myalgias, fever, chills.      Past Medical History    Past Medical History:   Diagnosis Date    Gastric bypass status for obesity        Past Surgical History   Past Surgical History:   Procedure Laterality Date    RECESSION RESECTION (REPAIR STRABISMUS) BILATERAL Bilateral 9/11/2023    Procedure: Bilateral eye muscle correction.;  Surgeon: Keny Howe MD;  Location: [facility] OR       Prior to Admission Medications   Prior to Admission Medications   Prescriptions Last Dose Informant Patient Reported? Taking?   Pediatric Multi Vit-Extra C-FA (FLINTSTONES/EXTRA C) CHEW Past Week Self, Other Yes Yes   Sig: Take 1 tablet by mouth every morning    SUMAtriptan (IMITREX STATDOSE) 6 MG/0.5ML pen injector kit 9/17/2023 at AM Self, Other No Yes   Sig: Inject 0.5 mLs (6 mg) Subcutaneous at onset of headache for migraine (repeat in 2 hours if needed) Max 12 mg/24 hours.   acetaminophen 500 MG CAPS Past Week at PRN Self, Other Yes Yes   Sig: Take 500 mg by mouth as needed    amoxicillin-clavulanate (AUGMENTIN) 875-125 MG tablet 9/17/2023 at AM Self, Other No Yes   Sig: Take 1 tablet by mouth 2 times daily for 10 days   cyanocobalamin (VITAMIN B-12) 1000 MCG SUBL sublingual tablet More than a month Self, Other Yes Yes   Sig: Place 1,000 mcg under the tongue daily    diazepam (DIAZEPAM INTENSOL) 5 MG/ML (HIGH CONC) solution More than a month at PRN Self, Other No Yes   Sig: Diazepam: Give 1 ml (5mg): Give 1 ml for seizures greater 5 minutes or more than 2 seizures within an 8 hour period. May repeat once 1 ml (5mg). Monitor breathing, if there any concerns call 911. Do not exceed 10 mg per day.   galcanezumab-gnlm (EMGALITY) 120 MG/ML injection More than a month at July Self, Other No Yes   Sig: Inject 1 mL (120 mg) Subcutaneous every 28 days   hydrOXYzine (VISTARIL) 25 MG capsule Past Week Self, Other Yes Yes   Sig: Take 50 mg by mouth every evening Pt states taking as needed as well.   lamoTRIgine (LAMICTAL) 100 MG TBDP ODT tab 9/16/2023 at PM Self, Other Yes Yes   Sig: Take 300 mg by mouth 2 times daily   moxifloxacin (VIGAMOX) 0.5 % ophthalmic solution 9/17/2023 at AM Self, Other No Yes   Sig: Place 1 drop into the right eye 4 times daily   ondansetron (ZOFRAN ODT) 4 MG ODT tab More than a month at PRN Self, Other No Yes   Sig: Take 1 tablet (4 mg) by mouth every 8 hours as needed for nausea   prednisoLONE acetate (PRED FORTE) 1 % ophthalmic suspension 9/17/2023 at AM Self, Other No Yes   Sig: Instill 1 drop into operative eye(s) four times a day.  Do NOT start drops until instructed by Surgeon.   sertraline (ZOLOFT) 100 MG tablet 9/16/2023 at PM Self, Other Yes Yes   Sig: Take 100 mg by mouth 2 times daily   topiramate (TOPAMAX) 100 MG tablet 9/16/2023 at PM Self, Other No Yes   Sig: Take 1 tablet (100 mg) by mouth 2 times daily   traZODone (DESYREL) 50 MG tablet Past Month at PRN Self, Other Yes Yes   Sig: Take  mg by mouth nightly as needed for sleep (after hydroxyzine)   vitamin D2 (ERGOCALCIFEROL) 83674 units (1250 mcg) capsule More than a month Self, Other Yes Yes   Sig: Take 1 capsule by mouth once a week   vitamin D3 (CHOLECALCIFEROL) 50 mcg (2000 units) tablet Past Week Self, Other Yes Yes   Sig: Take 1 tablet by mouth daily      Facility-Administered  Medications: None        Review of Systems    The 10 point Review of Systems is negative other than noted in the HPI or here.     Social History   I have reviewed this patient's social history and updated it with pertinent information if needed.  Social History     Tobacco Use    Smoking status: Never    Smokeless tobacco: Never   Substance Use Topics    Alcohol use: Never    Drug use: Never             Allergies   Allergies   Allergen Reactions    Reglan [Metoclopramide]     Benadryl [Diphenhydramine] Anxiety    Droperidol Unknown, Anxiety and Other (See Comments)     No reaction specified          Physical Exam   Vital Signs: Temp: 98  F (36.7  C) Temp src: Oral BP: 129/87 Pulse: 65   Resp: 20 SpO2: 97 % O2 Device: None (Room air)    Weight: 0 lbs 0 oz    Constitutional: awake, alert, cooperative, no apparent distress, and appears stated age  Eyes: Swelling, eyelid droop R eye. Watery discharge. Reddened sclera.   Respiratory: No increased work of breathing, good air exchange, clear to auscultation bilaterally, no crackles or wheezing  Cardiovascular: Normal apical impulse, regular rate and rhythm, normal S1 and S2, no S3 or S4, and no murmur noted  Neurologic: Mental Status Exam:  Level of Alertness:   awake  Orientation:   person, place, time  Memory:   normal  Cranial Nerves:  cranial nerves II-XII are grossly intact  Neuropsychiatric: General: normal, calm, and normal eye contact  Level of consciousness: alert / normal  Affect: normal and pleasant    Medical Decision Making       65 MINUTES SPENT BY ME on the date of service doing chart review, history, exam, documentation & further activities per the note.      Data     I have personally reviewed the following data over the past 24 hrs:    7.5  \   14.2   / 180     142 107 12.3 /  94   4.1 24 0.98 \     Procal: N/A CRP: 4.12 Lactic Acid: N/A         Imaging results reviewed over the past 24 hrs:   No results found for this or any previous visit (from the past  24 hour(s)).

## 2023-09-18 NOTE — CONSULTS
General ID Service: Initial Consultation     Patient:  Javi Brown, Date of birth 1985, Medical record number [id number]  Date of Visit:  September 18, 2023  Consult Requested by: Reid Terry MD         Assessment and Recommendations:   Problem List:  Right eye preseptal cellulitis  Obesity s/p gastric bypass  Seizure disorder  Migraines    Recommendations:  Continue vancomycin  Discontinue piperacillin-tazobactam  Notably, patient seemed to have facial flushing with Zosyn. Would not adjust flow rate of Zosyn to counter this, as it is ineffective (as opposed to vancomycin).   Start ceftriaxone 2g q24h  If no growth of MRSA/MRSE in the next 24 hours, can discontinue vancomycin  If stable for discharge per ophthalmology impression, would recommend another trial of amoxicillin-clavulanate 825/125mg PO BID x 14 days. Suspect worsening in outpatient setting on this last week was more so due to burden of disease rather than bug/drug mismatch (though cultures will help answer this).  Will follow cultures obtained by ophthalmology from purulent right eye fluid, thus far no growth at 1 day    ID will continue to follow    Discussion:  39yo M with right preseptal cellulitis following bilateral strabismus repair on 9/11, with worsening on Augmentin in outpatient setting, now improving on vancomycin and zosyn.    We can narrow the scope of our antibiotics a bit by dropping zosyn and replacing this with ceftriaxone. It's encouraging he is already improving. If further improved tomorrow, and at ophthalmology's discretion, could de-escalate back to Augmentin for discharge, with a planned 14 day course and close ophthalmology follow-up.    Kehinde Rosales MD  Division of Infectious Diseases and International Medicine  P: [phone]        History of Present Illness:     39yo M with history of  obestiy s/p gastric bypass, seizure d/o, migraines, prior pancreatitis, SUNDAY/MDD who was admitted 9/17 for right eye  preseptal cellulitis.     Pt had bilateral strabisumus repair on 9/11/23. On follow-up 2 days later, he was having increased pain and drainage from just his right eye. He was started on Augmentin and steroids. For the next 2 days, he describes the pain and drainage worsening, with his vision remaining a big foggy in that eye (clear on the left) and having some difficulty keeping the eye open. He was seen by ophthalmology on Sunday 9/17/23. Cultures were collected of serous, possibly purulent, fluid expressed from the corner of the eye/conjunctiva (aspiration was attempted with no fluid removed). He was then directly admitted and started on vancomycin and zosyn. A CT orbit was obtained, demonstrating preseptal cellulitis of the right eye.    Today, he is already much improved, stating the pain is markedly better and swelling has decreased. Cultures from 9/17 have shown no growth to date.         Review of Systems:   Full 10 point ROS obtained, pertinent positives and negatives as above.       Past Medical History:     Past Medical History:   Diagnosis Date    Gastric bypass status for obesity      Past Surgical History:   Procedure Laterality Date    RECESSION RESECTION (REPAIR STRABISMUS) BILATERAL Bilateral 9/11/2023    Procedure: Bilateral eye muscle correction.;  Surgeon: Keny Howe MD;  Location: [facility] OR       Allergies:      Allergies   Allergen Reactions    Reglan [Metoclopramide]     Benadryl [Diphenhydramine] Anxiety    Droperidol Unknown, Anxiety and Other (See Comments)     No reaction specified            Current Antimicrobials:     Vancomycin  Piperacillin-tazobactam       Family History:   Reviewed and non-contributory       Social History:     Social History     Socioeconomic History    Marital status: Single     Spouse name: Not on file    Number of children: Not on file    Years of education: Not on file    Highest education level: Not on file   Occupational History    Not on file    Tobacco Use    Smoking status: Never    Smokeless tobacco: Never   Substance and Sexual Activity    Alcohol use: Never    Drug use: Never    Sexual activity: Not on file   Other Topics Concern    Parent/sibling w/ CABG, MI or angioplasty before 65F 55M? Not Asked   Social History Narrative    Not on file     Social Determinants of Health     Financial Resource Strain: Not on file   Food Insecurity: Not on file   Transportation Needs: Not on file   Physical Activity: Not on file   Stress: Not on file   Social Connections: Not on file   Intimate Partner Violence: Not on file   Housing Stability: Not on file          Physical Exam:   Ranges forvital signs:  Temp:  [97.7  F (36.5  C)-98.3  F (36.8  C)] 97.7  F (36.5  C)  Pulse:  [60-65] 60  Resp:  [16-20] 16  BP: (125-129)/(67-87) 125/75  SpO2:  [93 %-98 %] 98 %    Intake/Output Summary (Last 24 hours) at 9/18/2023 1340  Last data filed at 9/18/2023 1228  Gross per 24 hour   Intake 490 ml   Output --   Net 490 ml     Exam:   GENERAL:  well-developed, well-nourished, sitting in bed in no acute distress.   ENT:  Head is normocephalic, atraumatic.   EYES:  Swelling and trace erythema around right eye with minimal TTP. Right conjunctiva injected. Left eye and orbit normal.    NECK:  Supple.  LUNGS:  Breathing easily on room air  ABDOMEN:  Nontender  EXT: Extremities warm and without edema.  SKIN:  No acute rashes.    NEUROLOGIC:  Grossly nonfocal.         Laboratory Data:     Reviewed.  Pertinent for:    Microbiology:  Culture   Date Value Ref Range Status   09/17/2023 No growth, less than 1 day  Preliminary     Metabolic Studies       Recent Labs   Lab Test 09/18/23  0512 09/17/23  1639 01/28/23  0023 11/05/21  1924 03/08/21  0000 03/06/21  0000 02/23/21  0000 05/04/20  1143 03/28/18  1942    142 143 142  --   --   --  138 140   POTASSIUM 4.0 4.1 3.7 3.9 4.1 4.0   < > 4.0 3.7   CHLORIDE 106 107 106 111*  --   --   --  105 105   CO2 25 24 22 25  --   --   --  27 26    ANIONGAP 9 11 15 6  --   --   --  6 9   BUN 15.0 12.3 12.0 16  --   --   --  21 11   CR 1.04 0.98 0.79 0.97 0.99 0.88   < > 0.99 0.74   GFRESTIMATED >90 >90 >90 >90 >90 >90   < > >90 >90   GLC 92 94 102* 82 100 93   < > 88 276*   MARKEL 9.1 9.4 9.0 8.8  --   --   --  9.2 8.5    < > = values in this interval not displayed.     Hematology Studies      Recent Labs   Lab Test 09/18/23  0512 09/17/23  1639 01/28/23  0023 05/04/20  1143 03/28/18  1942   WBC 5.8 7.5 7.5 6.3 5.2   ANEU  --   --   --  3.8 3.4   ALYM  --   --   --  1.6 1.4   SHAMEKA  --   --   --  0.7 0.2   AEOS  --   --   --  0.1 0.1   HGB 13.8 14.2 13.6 14.4 13.3   HCT 40.7 42.8 41.1 43.1 41.2    180 199 161 154         Latest Ref Rng & Units 9/18/2023     5:12 AM 9/17/2023     4:39 PM 1/28/2023    12:23 AM 11/5/2021     7:24 PM 3/8/2021    12:00 AM   Transplant Immunosuppression Labs   Creat 0.67 - 1.17 mg/dL 1.04  0.98  0.79  0.97  0.99       Urea Nitrogen 7 - 30 mg/dL    16     Urea Nitrogen 6.0 - 20.0 mg/dL 15.0  12.3  12.0      WBC 4.0 - 11.0 10e3/uL 5.8  7.5  7.5      Neutrophil %  64  64          This result is from an external source.          Imaging:   CT Orbits w Contrast  Result Date: 9/18/2023  IMPRESSION: Question mild soft tissue thickening and enhancement in the right periorbital soft tissue. This may suggest preseptal cellulitis. The left orbit is unremarkable. I have personally reviewed the examination and initial interpretation and I agree with the findings. JACK VALDES MD   SYSTEM ID:  [id number]

## 2023-09-18 NOTE — PROGRESS NOTES
OPHTHALMOLOGY CONSULT NOTE  09/18/23    Patient: Javi Brown    ASSESSMENT/PLAN:     Javi Brown is a 38 year old male who  was a direct admit from [facility] clinic given concern for infection s/p strabismus surgery 9/11/23. Please see clinic note from same date for details.     In summary, assessment and plan:  Pt developed right eye pain, redness and purulent chemosis concerning for infection post-op from strabismus surgery on 9/11/23 that did not respond to oral and topical antibiotics. He has been on amoxicillin-clavulanate since 9/15/23 with worsening symptoms. Collected cultures 9/17/23 in clinic initially attempted with aspiration but no meaningful volume of fluid could be aspirated safely. Serous fluid was instead drained from the opening made by the attempted aspiration, collected with swabs and delivered to the lab for cultures.     Interval 9/18: patient improving on IV abx    Plan:  - Continue IV zosyn and vancomycin.  - Continue vigamox right eye QID  - Continue erythromycin QID right eye for comfort  - Discontinued predforte QID on 9/17  - Consult ID. Appreciate recs, particularly regarding outpatient PO abx choice.   - Cultured purulent material from right conjunctiva as above.              - Aerobic, anaerobic and gram stain collected 9/17/23  - Ophthalmology will follow daily  - Anticipate discharge and transition to PO abx 9/19.       Discussed and seen with Dr. Howe.     Silvio Morgan MD  Resident Physician, PGY-2  Department of Ophthalmology      HISTORY OF PRESENTING ILLNESS:     Javi Brown is a 38 year old male directly admitted from [facility] for strabismus post-op infection.      Review of systems were otherwise negative except for that which has been stated above.    OCULAR/MEDICAL/SURGICAL HISTORIES:       Past Medical History:   Diagnosis Date    Gastric bypass status for obesity        Past Surgical History:   Procedure Laterality Date    RECESSION RESECTION (REPAIR  STRABISMUS) BILATERAL Bilateral 9/11/2023    Procedure: Bilateral eye muscle correction.;  Surgeon: Keny Howe MD;  Location: [facility] OR       EXAMINATION:     Base Eye Exam       Visual Acuity (Snellen - Linear)         Right Left    Near cc 20/40 20/20-2              Pupils         Pupils    Right PERRL    Left PERRL              Visual Fields         Left Right     Full Full              Extraocular Movement         Right Left     Ortho Ortho     -- 0 --   0  -1   -- 0 --    0 0 0   0  0   0 0 0                 Neuro/Psych       Oriented x3: Yes    Mood/Affect: Normal                  Strabismus Exam       Observations: Ortho      Distance Near Near +3DS N Bifocals                      - - 0 - -   0 0 0                       0  -1   0  0                       - - 0 - -   0 0 0                       Slit Lamp and Fundus Exam       External Exam         Right Left    External Normal Normal              Slit Lamp Exam         Right Left    Lids/Lashes scant purulent discharge, mild upper and lower lid edema Normal    Conjunctiva/Sclera 1+ injection, 2+chemosis, nasal conj purulent material improved. Some mucoid purulent discharge still 1+ injection and chemosis temporally    Cornea Clear Clear    Anterior Chamber Deep and quiet Deep and quiet    Iris Round and reactive Round and reactive    Lens Clear Clear    Anterior Vitreous Normal Normal                    Labs/Studies/Imaging Performed:  WBC 5.8 on 9/18 from 7.5  BMP unremarkable    CT orbits w/ contrast 9/17/23:  Question mild soft tissue thickening and enhancement in the  periorbital soft tissue. No extraconal, or intraconal edema. No  abnormal post septal enhancement. No intraorbital mass, hematoma or  air. Intact globes bilaterally. No proptosis.

## 2023-09-18 NOTE — PROGRESS NOTES
"  VS: /75   Pulse 60   Temp 97.7  F (36.5  C) (Oral)   Resp 16   SpO2 98%     O2: Room air saturations 98%.    Output: Denies issues    Last BM: Had BM this afternoon. Denies issues with bowel status   Activity: Up ad flavio   Skin: Right eye has some yellow discharge coming from it. Both eyes are reddened. Pt states\" My eyes are better then they were yesterday since taking those antibiotics IV.    Pain: Tylenol and Toradol controlling the pain. Pt also prefers to wear sun glasses as the brightness seems to make him feel like he has a headache.    CMS: Intact   Dressing: Both eyes open  as above description.    Diet: Regular.    LDA: IV infusing well with antibiotics.    Equipment: Eye drops    Plan: Pt states\"  I am hoping I get to go home tomorrow on oral antibiotics\" Pt was seen by ID, Ophthalmologist and medical today.    Additional Info: Pt is very pleasant in conversation. Resting quite comfortably between cares.       "

## 2023-09-18 NOTE — PHARMACY-ADMISSION MEDICATION HISTORY
Pharmacist Admission Medication History    Admission medication history is complete. The information provided in this note is only as accurate as the sources available at the time of the update.    Medication reconciliation/reorder completed by provider prior to medication history? Yes    Information Source(s): Patient and CareEverywhere/SureScripts via in-person    Pertinent Information:   Patient started Augmentin on Friday 9/15  History of Mark en Y, therefore some medications are ODT or chewable   Patient has both vitamin D2 50,000 units and vitamin D3 2000 units. He uses the D2 when labs show he is deficient, then switches to daily. He is currently taking daily vitamin D2, though adherence is low.  He missed his August dose of Emgality, last dose was early July     Changes made to PTA medication list:  Added: None  Deleted: buspirone, duplicate diazepam   Changed: lamotrigine (multiple doses on PTA med list before med rec, confirmed current dose is 300 mg BID); hydroxyzine (was 25 mg, now 50 mg); trazodone (was scheduled, now PRN as patient prefers using hydroxyzine first for sleep)    Medication Affordability: unable to assess     Allergies reviewed with patient and updates made in EHR: yes    Medication History Completed By: Twila Davey RPH 9/17/2023 7:06 PM    Prior to Admission medications    Medication Sig Last Dose Taking? Auth Provider Long Term End Date   acetaminophen 500 MG CAPS Take 500 mg by mouth as needed  Past Week at PRN Yes Reported, Patient     amoxicillin-clavulanate (AUGMENTIN) 875-125 MG tablet Take 1 tablet by mouth 2 times daily for 10 days 9/17/2023 at AM Yes eKny Howe MD  9/25/23   cyanocobalamin (VITAMIN B-12) 1000 MCG SUBL sublingual tablet Place 1,000 mcg under the tongue daily More than a month Yes Reported, Patient     diazepam (DIAZEPAM INTENSOL) 5 MG/ML (HIGH CONC) solution Diazepam: Give 1 ml (5mg): Give 1 ml for seizures greater 5 minutes or more than 2  seizures within an 8 hour period. May repeat once 1 ml (5mg). Monitor breathing, if there any concerns call 911. Do not exceed 10 mg per day. More than a month at PRN Yes Lesly Snyder MD     galcanezumab-gnlm (EMGALITY) 120 MG/ML injection Inject 1 mL (120 mg) Subcutaneous every 28 days More than a month at July Yes Swati Hernandez MD     hydrOXYzine (VISTARIL) 25 MG capsule Take 50 mg by mouth every evening Pt states taking as needed as well. Past Week Yes Reported, Patient     lamoTRIgine (LAMICTAL) 100 MG TBDP ODT tab Take 300 mg by mouth 2 times daily 9/16/2023 at PM Yes Unknown, Entered By History Yes    moxifloxacin (VIGAMOX) 0.5 % ophthalmic solution Place 1 drop into the right eye 4 times daily 9/17/2023 at AM Yes Keny Howe MD     ondansetron (ZOFRAN ODT) 4 MG ODT tab Take 1 tablet (4 mg) by mouth every 8 hours as needed for nausea More than a month at PRN Yes Swati Hernandez MD     Pediatric Multi Vit-Extra C-FA (FLINTSTONES/EXTRA C) CHEW Take 1 tablet by mouth every morning  Past Week Yes Reported, Patient     prednisoLONE acetate (PRED FORTE) 1 % ophthalmic suspension Instill 1 drop into operative eye(s) four times a day.  Do NOT start drops until instructed by Surgeon. 9/17/2023 at AM Yes Dilcia Harris MD     sertraline (ZOLOFT) 100 MG tablet Take 100 mg by mouth 2 times daily 9/16/2023 at PM Yes Reported, Patient Yes    SUMAtriptan (IMITREX STATDOSE) 6 MG/0.5ML pen injector kit Inject 0.5 mLs (6 mg) Subcutaneous at onset of headache for migraine (repeat in 2 hours if needed) Max 12 mg/24 hours. 9/17/2023 at AM Yes Swati Hernandez MD     topiramate (TOPAMAX) 100 MG tablet Take 1 tablet (100 mg) by mouth 2 times daily 9/16/2023 at PM Yes Lesly Snyder MD Yes    traZODone (DESYREL) 50 MG tablet Take  mg by mouth nightly as needed for sleep (after hydroxyzine) Past Month at PRN Yes Reported, Patient Yes    vitamin D2 (ERGOCALCIFEROL) 39099 units (1250 mcg)  capsule Take 1 capsule by mouth once a week More than a month No Reported, Patient     vitamin D3 (CHOLECALCIFEROL) 50 mcg (2000 units) tablet Take 1 tablet by mouth daily Past Week Yes Reported, Patient

## 2023-09-18 NOTE — PROGRESS NOTES
Mr. Brown developed facial flushing for a minute at the beginning of his Zosyn today. His Zosyn was put on hold temporarily and his symptoms completely resolved. He denies any chest pain, shortness of breath and his facial appearance is back to his baseline as he recovers from his right eye infection.     -Give one dose of Hydroxyzine  -Restart Zosyn at half the rate, if he does well, ramp up to usual rate to complete his dose    Lyndsey Bullock PA

## 2023-09-18 NOTE — PLAN OF CARE
Patient A/Ox4. VSS. Denies CP, SOB, dizziness/LH. LSCTA. +fl/BS. Voiding well in bathroom. CMS intact. Tolerating regular diet without NV. Activity level is good, independent. IV SL. Pain rated as comfortably managed throughout shift, pt took toradol x1, felt a migraine coming on. Consults today with I.D., ophthalmology, etc. Patient has demonstrated ability to call appropriately. Patient is resting with call light within reach. Will continue to monitor.    0655: Pt asked for pain meds, given. Patient states his R eye is substantially more blurry this AM, will pass through to day shift. Pt to be seen again by medical team this morning. Pt given warm blanket as well.

## 2023-09-18 NOTE — PROGRESS NOTES
"SPIRITUAL HEALTH SERVICES Progress Note  [facility] ([facility]) Ortho    Saw pt Javi Brown per admission request.    Patient/Family Understanding of Illness and Goals of Care - Javi shared he has been dealing with eye issues since he was a small child.  He was willing to give me a full description of his medical history.    Distress and Loss - Javi expressed that the hardest thing was his wife \"not being able to visit at the drop of a hat.\"  He also spoke about how \"badly I need to vent\" regarding his historical medical experiences.    Strengths, Coping, and Resources  - Javi identified music as being what \"literally keeps me going.\"  He named several of his running accomplishments and declared his hopes to get back to it.  He said, \"I'm a hugger, my in-laws are huggers.\"    Meaning, Beliefs, and Spirituality - Javi shared several examples of times he felt God directly impacted his life.  He identified himself as a Christianity, but said he'd stopped going to Yazidism.  He told me, \"My wife is Muslim.\"  He spoke about family dynamics around [place]ian identity both from his Mother and his In-laws.    Plan of Care - I listened to much of Javi's life story, offering words of support and observations of spiritual significance.  I gave him a copy of \"Daily Bread\" and we discussed several options of reconnecting with a spiritual community in his local area.  Javi would appreciate a visit from the unit  and knows how to contact [facility] should need arise.    Lizzie August   Chaplain Resident  Pager [phone]    * [facility] remains available 24/7 for emergent requests/referrals, either by having the switchboard page the on-call  or by entering an ASAP/STAT consult in Epic (this will also page the on-call ). Routine Epic consults receive an initial response within 24 hours.*   "

## 2023-09-18 NOTE — PROGRESS NOTES
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      # Right eye cellulitis   # Congential strabismus s/p strabismus surgery to bilateral eyes(9/11/23)  Patient with history of congenital strabismus s/p strabismus surgery 9/11/23. A few days after surgery on 9/15/23 he presented to his ophthalmologist with with swelling, pain, redness in the right eye. He was started on steroid eye drops and oral Augmentin, but his symptoms worsened and he came to the ER on 9/17/23 for further evaluation. CT of the orbits reveals cellulitis but no infection deeper down or extending elsewhere.     -Ophthalmology and Infectious Disease following closely and we appreciate their assistance. Per recommendations:   - Continue IV Zosyn and vancomycin  -Continue Vigamox to the right eye four times daily, STOP prednisone eye drops  -Continue erythromycin eye drops to right eye four times daily   -Culture from right eye pending from 9/17/23     # Chronic migraine headaches  Patient has had migraines since he was a toddler.Currently does have a migraine he rates as 5/10 for pain.  Follows a neurologist for migraines and seizures.  Missed the last dose of Emgality that he takes every 28 days.  -Continue PTA topiramate scheduled  -Continue PTA sumatriptan 6 mg  "subcu as needed  -APAP PRN      # Seizures  Has not had a grand mal seizure for 1-1/2 to 2 years.  States he occasionally has a focal seizure where he will just stare off into space.  -Diazepam 5 mg IV as needed for a seizure  -Notify medicine if patient has seizure activity     # Obesity s/p bariatric surgery  -Continue PTA vitamin B12, vitamin D3     # SUNDAY  # MDD  -Continue PTA sertraline, lamotrigine, hydroxyzine scheduled  -Hydroxyzine as needed       Diet: Combination Diet Regular Diet Adult    DVT Prophylaxis: Ambulate every shift  Greer Catheter: Not present  Lines: None     Cardiac Monitoring: None  Code Status: Full Code      Clinically Significant Risk Factors Present on Admission                       # Obesity: Estimated body mass index is 35.87 kg/m  as calculated from the following:    Height as of 9/11/23: 1.778 m (5' 10\").    Weight as of 9/11/23: 113.4 kg (250 lb).              Disposition Plan  From home and will return to home with his wife once medically stable, likely in 1-2 days    Expected Discharge Date: 09/21/2023                    CASSANDRA Singh  Hospitalist Service, GOLD TEAM 20  [facility]  Securely message with [employer] (more info)  Text page via [facility] Paging/Directory   See signed in provider for up to date coverage information  ______________________________________________________________________    Interval History   Mr. Brown was resting in bed this morning reporting that he was having some blurry vision in his right eye keeping him from reading a chart on the wall, but his overall pain had markedly improved and was doing well in general. He denies any fevers, chills, chest pain, shortness of breath, difficulty swallowing or chewing.     Physical Exam   Vital Signs: Temp: 97.7  F (36.5  C) Temp src: Oral BP: 125/75 Pulse: 60   Resp: 16 SpO2: 98 % O2 Device: None (Room air)    Weight: 0 lbs 0 oz    General Appearance:  38 year old " gentleman resting in bed, no acute distress, very talkative, reports minimal pain mostly related to headache  HEENT: normocephalic, atraumatic, right eye with yellow exudate over eyelids, injected appearance to sclera, left eye normal in appearance  Respiratory: breathing comfortably on room air, no adventitious sounds to bilateral auscultation  Cardiovascular: regular rate and rhythm, no appreciable murmurs, rubs or gallops      Data     I have personally reviewed the following data over the past 24 hrs:    5.8  \   13.8   / 173     140 106 15.0 /  92   4.0 25 1.04 \     Procal: N/A CRP: 4.12 Lactic Acid: N/A

## 2023-09-18 NOTE — PLAN OF CARE
Goal Outcome Evaluation:    Patient admitted for eye infection following a surgical procedure. Patient is A&O x4, VSS, CMS intact. Pt denies pain, no SOB or cough noted. Flyer called to start IV access. No concerns, on coming nurse to follow up regarding CT with contrast of L orbit. Continue with POC.    Patient's most recent vitals:  /87   Pulse 65   Temp 98  F (36.7  C) (Oral)   Resp 20   SpO2 97%

## 2023-09-18 NOTE — CONSULTS
OPHTHALMOLOGY CONSULT NOTE  09/17/23    Patient was a direct admit from [facility] clinic given concern for infection s/p strabismus surgery 9/11/23. Please see clinic note from same date for details.     In summary, assessment and plan:  He developed right eye pain, redness and purulent chemosis concerning for infection post-op from strabismus surgery on 9/11/23 that did not respond to oral and topical antibiotics. He has been on amoxicillin-clavulanate since 9/15/23 with worsening symptoms. Collected cultures 9/17/23 in clinic initially attempted with aspiration but no meaningful volume of fluid could be aspirated safely. Serous fluid was instead drained from the opening made by the attempted aspiration, collected with swabs and delivered to the lab for cultures.   - Admit to medicine for orbital cellulitis with IV zosyn and vancomycin.  - Continue vigamox right eye QID  - Added erythromycin QID right eye for comfort  - Discontinued predforte QID (discontinued for you)  - CT orbits with contrast   - Consult ID  - Cultured purulent material from right conjunctiva as above.              - Aerobic, anaerobic and gram stain collected 9/17/23  - Ophthalmology will follow daily    Thaddeus Krishnamurthy MD  Resident Physician, PGY-2  Department of Ophthalmology

## 2023-09-19 VITALS
HEART RATE: 72 BPM | DIASTOLIC BLOOD PRESSURE: 72 MMHG | SYSTOLIC BLOOD PRESSURE: 125 MMHG | OXYGEN SATURATION: 94 % | TEMPERATURE: 97.6 F | RESPIRATION RATE: 16 BRPM

## 2023-09-19 LAB
CREAT SERPL-MCNC: 1 MG/DL (ref 0.67–1.17)
EGFRCR SERPLBLD CKD-EPI 2021: >90 ML/MIN/1.73M2
VANCOMYCIN SERPL-MCNC: 12.7 UG/ML

## 2023-09-19 PROCEDURE — 250N000013 HC RX MED GY IP 250 OP 250 PS 637: Performed by: PHYSICIAN ASSISTANT

## 2023-09-19 PROCEDURE — 36415 COLL VENOUS BLD VENIPUNCTURE: CPT | Performed by: INTERNAL MEDICINE

## 2023-09-19 PROCEDURE — 250N000011 HC RX IP 250 OP 636: Mod: JZ | Performed by: STUDENT IN AN ORGANIZED HEALTH CARE EDUCATION/TRAINING PROGRAM

## 2023-09-19 PROCEDURE — 250N000009 HC RX 250

## 2023-09-19 PROCEDURE — 99231 SBSQ HOSP IP/OBS SF/LOW 25: CPT | Performed by: STUDENT IN AN ORGANIZED HEALTH CARE EDUCATION/TRAINING PROGRAM

## 2023-09-19 PROCEDURE — 250N000013 HC RX MED GY IP 250 OP 250 PS 637: Performed by: PEDIATRICS

## 2023-09-19 PROCEDURE — 250N000013 HC RX MED GY IP 250 OP 250 PS 637

## 2023-09-19 PROCEDURE — 80202 ASSAY OF VANCOMYCIN: CPT | Performed by: INTERNAL MEDICINE

## 2023-09-19 PROCEDURE — 250N000011 HC RX IP 250 OP 636: Mod: JZ

## 2023-09-19 PROCEDURE — 82565 ASSAY OF CREATININE: CPT | Performed by: INTERNAL MEDICINE

## 2023-09-19 PROCEDURE — 250N000011 HC RX IP 250 OP 636: Performed by: PEDIATRICS

## 2023-09-19 PROCEDURE — 258N000003 HC RX IP 258 OP 636: Performed by: PEDIATRICS

## 2023-09-19 PROCEDURE — 99239 HOSP IP/OBS DSCHRG MGMT >30: CPT | Performed by: PHYSICIAN ASSISTANT

## 2023-09-19 RX ORDER — CEFTRIAXONE 2 G/1
2 INJECTION, POWDER, FOR SOLUTION INTRAMUSCULAR; INTRAVENOUS EVERY 24 HOURS
Status: DISCONTINUED | OUTPATIENT
Start: 2023-09-19 | End: 2023-09-19

## 2023-09-19 RX ORDER — PREDNISOLONE ACETATE 10 MG/ML
1 SUSPENSION/ DROPS OPHTHALMIC 4 TIMES DAILY
Qty: 5 ML | Refills: 0 | Status: SHIPPED | OUTPATIENT
Start: 2023-09-19 | End: 2023-10-19

## 2023-09-19 RX ORDER — MOXIFLOXACIN 5 MG/ML
1 SOLUTION/ DROPS OPHTHALMIC 4 TIMES DAILY
Qty: 3 ML | Refills: 0 | Status: SHIPPED | OUTPATIENT
Start: 2023-09-19 | End: 2023-10-19

## 2023-09-19 RX ORDER — PREDNISOLONE ACETATE 10 MG/ML
1 SUSPENSION/ DROPS OPHTHALMIC 4 TIMES DAILY
Status: DISCONTINUED | OUTPATIENT
Start: 2023-09-19 | End: 2023-09-19 | Stop reason: HOSPADM

## 2023-09-19 RX ORDER — DIPHENHYDRAMINE HCL 50 MG
50 CAPSULE ORAL EVERY 6 HOURS PRN
Status: DISCONTINUED | OUTPATIENT
Start: 2023-09-19 | End: 2023-09-19

## 2023-09-19 RX ADMIN — KETOROLAC TROMETHAMINE 30 MG: 30 INJECTION, SOLUTION INTRAMUSCULAR at 05:26

## 2023-09-19 RX ADMIN — Medication 50 MCG: at 08:58

## 2023-09-19 RX ADMIN — AMOXICILLIN AND CLAVULANATE POTASSIUM 1 TABLET: 875; 125 TABLET, FILM COATED ORAL at 11:45

## 2023-09-19 RX ADMIN — Medication 1000 MCG: at 08:58

## 2023-09-19 RX ADMIN — PREDNISOLONE ACETATE 1 DROP: 10 SUSPENSION/ DROPS OPHTHALMIC at 09:00

## 2023-09-19 RX ADMIN — MOXIFLOXACIN 1 DROP: 5 SOLUTION/ DROPS OPHTHALMIC at 09:05

## 2023-09-19 RX ADMIN — SERTRALINE HYDROCHLORIDE 100 MG: 100 TABLET ORAL at 08:58

## 2023-09-19 RX ADMIN — TOPIRAMATE 100 MG: 100 TABLET, FILM COATED ORAL at 08:58

## 2023-09-19 RX ADMIN — LAMOTRIGINE 300 MG: 100 TABLET, ORALLY DISINTEGRATING ORAL at 09:44

## 2023-09-19 RX ADMIN — CEFTRIAXONE SODIUM 2 G: 2 INJECTION, POWDER, FOR SOLUTION INTRAMUSCULAR; INTRAVENOUS at 00:47

## 2023-09-19 RX ADMIN — MOXIFLOXACIN 1 DROP: 5 SOLUTION/ DROPS OPHTHALMIC at 11:46

## 2023-09-19 RX ADMIN — VANCOMYCIN HYDROCHLORIDE 1250 MG: 10 INJECTION, POWDER, LYOPHILIZED, FOR SOLUTION INTRAVENOUS at 09:45

## 2023-09-19 RX ADMIN — ACETAMINOPHEN 650 MG: 325 TABLET, FILM COATED ORAL at 04:57

## 2023-09-19 RX ADMIN — PREDNISOLONE ACETATE 1 DROP: 10 SUSPENSION/ DROPS OPHTHALMIC at 11:55

## 2023-09-19 ASSESSMENT — ACTIVITIES OF DAILY LIVING (ADL)
ADLS_ACUITY_SCORE: 20

## 2023-09-19 NOTE — PLAN OF CARE
Goal Outcome Evaluation:      Plan of Care Reviewed With: patient    Overall Patient Progress: no changeOverall Patient Progress: no change    Outcome Evaluation: Pt was flushed when zosyn was started @1500, MD notified and rate changed to 100mL/hr. Pt states feeling better. Still has purulent swollen R eye, continue with ophthalmic drops/ointment and IV abx per plan of care

## 2023-09-19 NOTE — PLAN OF CARE
Pt is alert and oriented x 4, able to make needs known. Call light within reach. On RA stable. Denies pain, SOB, CP and lightheadedness. Independent in room. Tolerating Tolerating regular diet. LBM 9/19/23. Voiding without difficulty. IV Abx per care plan.     Pt is discharging home. Pharmacy was not able to refill the discharged medications. Pt confirmed he has the following discharge medications ( exact order) Augmentin, Moxifloxacin and PrednisoLONE while discharge pharmacy was on the phone.     1247-Reviewed discharge instruction and orders with pt. No concern at the time of discharge.     Patient discharged at 1440

## 2023-09-19 NOTE — DISCHARGE SUMMARY
"[facility]  Hospitalist Discharge Summary      Date of Admission:  9/17/2023  Date of Discharge:  9/19/2023  Discharging Provider: CASSANDRA Singh  Discharge Service: Hospitalist Service, GOLD TEAM 20    Discharge Diagnoses   # Right eye cellulitis   # Congential strabismus s/p strabismus surgery to bilateral eyes(9/11/23)  # Chronic migraine headaches   # Seizures   # Obesity s/p bariatric surgery   # SUNDAY  # MDD    Clinically Significant Risk Factors     # Obesity: Estimated body mass index is 35.87 kg/m  as calculated from the following:    Height as of 9/11/23: 1.778 m (5' 10\").    Weight as of 9/11/23: 113.4 kg (250 lb).       Follow-ups Needed After Discharge   Follow-up Appointments     Follow Up ([facility])      Please follow up with Ophthalmology on September 22nd, 2023 (already   scheduled)    Please follow up with Neurology on September 25th, 2023 (already   scheduled)    Appointments on [place] and/or [facility] (with [facility] or [facility]   provider or service). Call [phone] if you haven't heard regarding   these appointments within 7 days of discharge.            Unresulted Labs Ordered in the Past 30 Days of this Admission       Date and Time Order Name Status Description    9/17/2023  3:08 PM AEROBIC BACTERIAL CULTURE ROUTINE Preliminary             Discharge Disposition   Discharged to home  Condition at discharge: Stable    Hospital Course   Javi Brown is a 38 year old male with a past medical history of obesity status post bariatric surgery, seizures, migraine headaches, SUNDAY, MDD, ADHD, anxiety, pancreatitis and congential strabismus s/p strabismus surgery 9/11/23 and developed post operative constant tearing and irritation concerning for infection and was placed on oral Augmentin and steroid eye drops with concern for infection. His symptoms worsened and he came to the ER on 9/17/23 for further evaluation. CT of the orbits revealed " cellulitis but no infection deeper down or extending elsewhere. Ophthalmology and Infectious Disease were consulted and per recommendations, he was placed on IV Vancomycin and Zosyn and then transitioned to oral Augmentin on day 3 of his hospital stay. He will discharge home on a 14 day course of oral Augmentin, Prednisone eye drops bilaterally and Vigamox eye drops to the right eye four times daily and will follow up with Ophthalmology as an outpatient.     Consultations This Hospital Stay   OPHTHALMOLOGY IP CONSULT  PHARMACY TO DOSE VANCO  INFECTIOUS DISEASE [facility] ADULT IP CONSULT    Code Status   Full Code    Time Spent on this Encounter   ILyndsey PA, personally saw the patient today and spent greater than 30 minutes discharging this patient.       CASSANDRA Singh  [facility]TH[facility]  [address] [place] [id number]  Phone: [phone]  Fax: [phone]  ______________________________________________________________________    Physical Exam   Vital Signs: Temp: 97.6  F (36.4  C) Temp src: Oral BP: 125/72 Pulse: 72   Resp: 16 SpO2: 94 % O2 Device: None (Room air)    Weight: 0 lbs 0 oz  General Appearance:  38 year old gentleman resting in bed, no acute distress, very talkative, reports minimal pain mostly related to headache  HEENT: normocephalic, atraumatic, right eye with yellow exudate over eyelids, injected appearance to sclera, left eye normal in appearance  Respiratory: breathing comfortably on room air, no adventitious sounds to bilateral auscultation  Cardiovascular: regular rate and rhythm, no appreciable murmurs, rubs or gallops          Primary Care Physician   Lauren Ambriz    Discharge Orders      Reason for your hospital stay    You were hospitalized with right eye orbital cellulitis.     Activity    Your activity upon discharge: activity as tolerated     Follow Up ([facility])    Please follow up with Ophthalmology on September 22nd,  2023 (already scheduled)    Please follow up with Neurology on September 25th, 2023 (already scheduled)    Appointments on [place] and/or [facility] (with [facility] or [facility] provider or service). Call [phone] if you haven't heard regarding these appointments within 7 days of discharge.     Diet    Follow this diet upon discharge: Orders Placed This Encounter      Combination Diet Regular Diet Adult       Significant Results and Procedures   Most Recent 3 CBC's:  Recent Labs   Lab Test 09/18/23  0512 09/17/23  1639 01/28/23  0023   WBC 5.8 7.5 7.5   HGB 13.8 14.2 13.6   MCV 93 94 94    180 199     Most Recent 3 BMP's:  Recent Labs   Lab Test 09/19/23  0740 09/18/23  0512 09/17/23  1639 01/28/23  0023   NA  --  140 142 143   POTASSIUM  --  4.0 4.1 3.7   CHLORIDE  --  106 107 106   CO2  --  25 24 22   BUN  --  15.0 12.3 12.0   CR 1.00 1.04 0.98 0.79   ANIONGAP  --  9 11 15   MARKEL  --  9.1 9.4 9.0   GLC  --  92 94 102*     Most Recent 2 LFT's:  Recent Labs   Lab Test 02/23/21  0000 05/04/20  1143 03/28/18  1942   AST 30 32 40   ALT 13 31 34   ALKPHOS  --  63 87   BILITOTAL  --  0.9 0.7     Most Recent 3 INR's:No lab results found.    Discharge Medications   Current Discharge Medication List        CONTINUE these medications which have CHANGED    Details   amoxicillin-clavulanate (AUGMENTIN) 875-125 MG tablet Take 1 tablet by mouth every 12 hours for 14 days  Qty: 28 tablet, Refills: 0    Associated Diagnoses: Orbital cellulitis on right; Seizures (H); Alternating esotropia      moxifloxacin (VIGAMOX) 0.5 % ophthalmic solution Place 1 drop into the right eye 4 times daily for 30 days  Qty: 3 mL, Refills: 0    Associated Diagnoses: Postoperative eye state      prednisoLONE acetate (PRED FORTE) 1 % ophthalmic suspension Place 1 drop into both eyes 4 times daily for 30 days  Qty: 5 mL, Refills: 0    Associated Diagnoses: Orbital cellulitis on right; Seizures (H); Alternating esotropia           CONTINUE these  medications which have NOT CHANGED    Details   acetaminophen 500 MG CAPS Take 500 mg by mouth as needed       cyanocobalamin (VITAMIN B-12) 1000 MCG SUBL sublingual tablet Place 1,000 mcg under the tongue daily      diazepam (DIAZEPAM INTENSOL) 5 MG/ML (HIGH CONC) solution Diazepam: Give 1 ml (5mg): Give 1 ml for seizures greater 5 minutes or more than 2 seizures within an 8 hour period. May repeat once 1 ml (5mg). Monitor breathing, if there any concerns call 911. Do not exceed 10 mg per day.  Qty: 30 mL, Refills: 0    Associated Diagnoses: Seizure (H)      galcanezumab-gnlm (EMGALITY) 120 MG/ML injection Inject 1 mL (120 mg) Subcutaneous every 28 days  Qty: 1 mL, Refills: 11    Associated Diagnoses: Chronic migraine without aura without status migrainosus, not intractable      hydrOXYzine (VISTARIL) 25 MG capsule Take 50 mg by mouth every evening Pt states taking as needed as well.      lamoTRIgine (LAMICTAL) 100 MG TBDP ODT tab Take 300 mg by mouth 2 times daily      ondansetron (ZOFRAN ODT) 4 MG ODT tab Take 1 tablet (4 mg) by mouth every 8 hours as needed for nausea  Qty: 18 tablet, Refills: 11    Associated Diagnoses: Chronic migraine without aura without status migrainosus, not intractable      Pediatric Multi Vit-Extra C-FA (FLINTSTONES/EXTRA C) CHEW Take 1 tablet by mouth every morning       sertraline (ZOLOFT) 100 MG tablet Take 100 mg by mouth 2 times daily      SUMAtriptan (IMITREX STATDOSE) 6 MG/0.5ML pen injector kit Inject 0.5 mLs (6 mg) Subcutaneous at onset of headache for migraine (repeat in 2 hours if needed) Max 12 mg/24 hours.  Qty: 9 kit, Refills: 11    Associated Diagnoses: Chronic migraine without aura without status migrainosus, not intractable      topiramate (TOPAMAX) 100 MG tablet Take 1 tablet (100 mg) by mouth 2 times daily  Qty: 180 tablet, Refills: 3    Associated Diagnoses: Chronic migraine without aura without status migrainosus, not intractable      traZODone (DESYREL) 50 MG  tablet Take  mg by mouth nightly as needed for sleep (after hydroxyzine)      vitamin D3 (CHOLECALCIFEROL) 50 mcg (2000 units) tablet Take 1 tablet by mouth daily      vitamin D2 (ERGOCALCIFEROL) 18592 units (1250 mcg) capsule Take 1 capsule by mouth once a week           Allergies   Allergies   Allergen Reactions    Benadryl [Diphenhydramine] Anxiety    Reglan [Metoclopramide]     Droperidol Unknown, Anxiety and Other (See Comments)     No reaction specified

## 2023-09-19 NOTE — PROGRESS NOTES
OPHTHALMOLOGY CONSULT NOTE  09/18/23    Patient: Javi Brown    ASSESSMENT/PLAN:     Javi Brown is a 38 year old male who was a direct admit from [facility] clinic given concern for infection s/p strabismus surgery 9/11/23. Please see clinic note from same date for details.     In summary, assessment and plan:  Pt developed right eye pain, redness and purulent chemosis concerning for infection post-op from strabismus surgery on 9/11/23 that did not respond to oral and topical antibiotics. He has been on amoxicillin-clavulanate since 9/15/23 with worsening symptoms. Collected cultures 9/17/23 in clinic initially attempted with aspiration but no meaningful volume of fluid could be aspirated safely. Serous fluid was instead drained from the opening made by the attempted aspiration, collected with swabs and delivered to the lab for cultures.     Interval   9/18: patient improving on IV abx  9/19: [facility] 20/20 and chemosis continues to improve, no purulence.    Plan:  - Plan for discharge today 9/19  - Agree with Antibiotics per ID recommendations:    IV vancomycin and ceftriaxone while inpatient  Transition to PO antibiotics for discharge: Augmentin 825/125 mg BID x 14 days.   - Continue Pred forte QID right eye and Start Pred forte QID left eye (routine post op drops, ordered for you)  - Continue vigamox right eye QID  - Discontinue erythromycin (done for you)  - Cultured purulent material from right conjunctiva as above.              - Aerobic, anaerobic and gram stain collected 9/17/23. NGTD.     Follow Up: 8AM Friday in [facility] eye clinic.     Discussed and seen with Dr. Howe.     Silvio Morgan MD  Resident Physician, PGY-2  Department of Ophthalmology      Addendum (Carley- Attending)  Patient seen today with Dr. Morgan.  Patient feels subjectively less pain and vision more clear right eye. 20/20 visual acuity on near card right eye today. Exam shows stable injection and swelling of right eye from  yesterday (improved from exam on Friday and Sunday of last week).  Appreciate ID input.  Given patient appears clinically improved he can discharge to home on Augmentin as per ID recs.  He will follow-up with me on Friday this week in neuro-ophthalmology clinic (appointment already made and patient aware).  Oral antibiotics and drops as per Dr. Morgan's note above.      Keny Howe MD  , Neuro-Ophthalmology and Adult Strabismus Surgery  The Gavin JESUS and Yareli Hay Chair in Neuro-Ophthalmology  Department of Ophthalmology and Visual Neurosciences  [facility]        HISTORY OF PRESENTING ILLNESS:     Javi Brown is a 38 year old male directly admitted from [facility] for strabismus post-op infection.      Review of systems were otherwise negative except for that which has been stated above.    OCULAR/MEDICAL/SURGICAL HISTORIES:       Past Medical History:   Diagnosis Date    Gastric bypass status for obesity        Past Surgical History:   Procedure Laterality Date    RECESSION RESECTION (REPAIR STRABISMUS) BILATERAL Bilateral 9/11/2023    Procedure: Bilateral eye muscle correction.;  Surgeon: Keny Howe MD;  Location: [facility] OR       EXAMINATION:     Base Eye Exam       Visual Acuity (Snellen - Linear)         Right Left    Near cc 20/20 20/20              Tonometry (Palpation, 8:06 AM)         Right Left    Pressure soft soft              Pupils         Pupils    Right PERRL    Left PERRL              Visual Fields         Left Right     Full Full              Extraocular Movement         Right Left     Ortho Ortho     0 0 0   0  -1   0 0 0    0 0 0   trace  0   0 0 0                 Neuro/Psych       Oriented x3: Yes    Mood/Affect: Normal                  Slit Lamp and Fundus Exam       External Exam         Right Left    External Normal Normal              Slit Lamp Exam         Right Left    Lids/Lashes scant purulent discharge, mild upper and lower  lid edema Normal    Conjunctiva/Sclera 1+ injection, 2+chemosis, nasal conj purulent material improved. No mucoid purulent discharge today 1+ injection and chemosis temporally    Cornea Clear Clear    Anterior Chamber Deep and quiet Deep and quiet    Iris Round and reactive Round and reactive    Lens Clear Clear    Anterior Vitreous Normal Normal                    Labs/Studies/Imaging Performed:  WBC 5.8 on 9/18 from 7.5  BMP unremarkable  Aerobic/Anaerobic cultures right eye conjunctival purulence: NGTD as of 9/19 AM.     CT orbits w/ contrast 9/17/23:  Question mild soft tissue thickening and enhancement in the  periorbital soft tissue. No extraconal, or intraconal edema. No  abnormal post septal enhancement. No intraorbital mass, hematoma or  air. Intact globes bilaterally. No proptosis.

## 2023-09-19 NOTE — PROGRESS NOTES
A/Ox's 4. Pt rated pain as tolerable. Tylenol and Toradol given for pain control. CMS intact. Tolerated regular diet. Denied any nausea, CP, SOB, lightheadedness or dizziness. Voiding without pain or difficulty. Pt up ind. Abx given per plan of care. Resting in bed at this time with call light in reach. Able to make needs known. Continue to monitor.

## 2023-09-19 NOTE — PHARMACY-VANCOMYCIN DOSING SERVICE
Pharmacy Vancomycin Note  Date of Service 2023  Patient's  1985   38 year old, male    Indication: Abscess  Day of Therapy: Started 23  Current vancomycin regimen:  1250 mg IV q12h  Current vancomycin monitoring method: AUC  Current vancomycin therapeutic monitoring goal: 400-600 mg*h/L    Insight[employer] Prediction of Current Vancomycin Regimen  Regimen: 1250 mg IV every 12 hours.  Exposure target: AUC24 (range) 400-600 mg/L.hr   AUC24,ss: 455 mg/L.hr  Probability of AUC24 > 400: 73 %  Ctrough,ss: 14.4 mg/L  Probability of Ctrough,ss > 20: 14 %  Probability of nephrotoxicity (Lodise CLEVELAND ): 10 %    Current estimated CrCl = Estimated Creatinine Clearance: 123.9 mL/min (based on SCr of 1 mg/dL).    Creatinine for last 3 days  2023:  4:39 PM Creatinine 0.98 mg/dL  2023:  5:12 AM Creatinine 1.04 mg/dL  2023:  7:40 AM Creatinine 1.00 mg/dL    Recent Vancomycin Levels (past 3 days)  2023:  7:40 AM Vancomycin 12.7 ug/mL    Vancomycin IV Administrations (past 72 hours)                     vancomycin (VANCOCIN) 1,250 mg in 0.9% NaCl 250 mL intermittent infusion (mg) 1,250 mg New Bag 23 0945     1,250 mg New Bag 23 2238     1,250 mg New Bag  1028     1,250 mg New Bag 23 2201             Nephrotoxins and other renal medications (From now, onward)      Start     Dose/Rate Route Frequency Ordered Stop    23 1730  vancomycin (VANCOCIN) 1,250 mg in 0.9% NaCl 250 mL intermittent infusion         1,250 mg  over 90 Minutes Intravenous EVERY 12 HOURS 23 1724      23 1648  ketorolac (TORADOL) injection 30 mg         30 mg Intravenous EVERY 6 HOURS PRN 23 1649 23 1647       Contrast Orders - past 72 hours (72h ago, onward)      Start     Dose/Rate Route Frequency Stop    23 2100  iopamidol (ISOVUE-370) solution 100 mL         100 mL Intravenous ONCE 23            Interpretation of levels and current regimen:  Vancomycin level is  reflective of -600    Has serum creatinine changed greater than 50% in last 72 hours: No    Urine output:  unable to determine    Renal Function: Stable    Plan:  Continue Current Dose Vancomycin 1250 mg IV every 12 hr  Vancomycin monitoring method: AUC  Vancomycin therapeutic monitoring goal: 400-600 mg*h/L  Pharmacy will check vancomycin levels as appropriate in 1-3 Days.  Serum creatinine levels will be ordered daily for the first week of therapy and at least twice weekly for subsequent weeks.    Miladys Ruiz RP

## 2023-09-19 NOTE — PROGRESS NOTES
General ID Service: Follow-up Note     Patient:  Javi Brown, Date of birth 1985, Medical record number [id number]  Date of Visit:  September 18, 2023  Consult Requested by: Reid Terry MD         Assessment and Recommendations:   Problem List:  Right eye preseptal cellulitis  Obesity s/p gastric bypass  Seizure disorder  Migraines    Recommendations:  Agree with plan for discharge with amoxicillin-clavulanate x 14 days and close ophthalmology follow-up on Friday.    ID will sign off at this time, but please do not hesitate to call with any questions!    Discussion:  37yo M with right preseptal cellulitis following bilateral strabismus repair on 9/11, with worsening on Augmentin in outpatient setting, now improving on vancomycin and zosyn.    Given continued clinical improvement, can transition to PO amoxicillin-clavulanate today. Appreciate close follow-up plan from ophthalmology.    Kehinde Rosales MD  Division of Infectious Diseases and International Medicine  P: [phone]        Interval History:     Seen and examined. Significant improvement in swelling around his right eye, as well as conjunvtival injection.       History of Present Illness:     37yo M with history of  obestiy s/p gastric bypass, seizure d/o, migraines, prior pancreatitis, SUNDAY/MDD who was admitted 9/17 for right eye preseptal cellulitis.     Pt had bilateral strabisumus repair on 9/11/23. On follow-up 2 days later, he was having increased pain and drainage from just his right eye. He was started on Augmentin and steroids. For the next 2 days, he describes the pain and drainage worsening, with his vision remaining a big foggy in that eye (clear on the left) and having some difficulty keeping the eye open. He was seen by ophthalmology on Sunday 9/17/23. Cultures were collected of serous, possibly purulent, fluid expressed from the corner of the eye/conjunctiva (aspiration was attempted with no fluid removed). He was then  directly admitted and started on vancomycin and zosyn. A CT orbit was obtained, demonstrating preseptal cellulitis of the right eye.    Today, he is already much improved, stating the pain is markedly better and swelling has decreased. Cultures from 9/17 have shown no growth to date.         Review of Systems:   Full 10 point ROS obtained, pertinent positives and negatives as above.       Past Medical History:     Past Medical History:   Diagnosis Date    Gastric bypass status for obesity      Past Surgical History:   Procedure Laterality Date    RECESSION RESECTION (REPAIR STRABISMUS) BILATERAL Bilateral 9/11/2023    Procedure: Bilateral eye muscle correction.;  Surgeon: Keny Howe MD;  Location: [facility] OR       Allergies:      Allergies   Allergen Reactions    Benadryl [Diphenhydramine] Anxiety    Reglan [Metoclopramide]     Droperidol Unknown, Anxiety and Other (See Comments)     No reaction specified            Current Antimicrobials:     Vancomycin  Piperacillin-tazobactam       Family History:   Reviewed and non-contributory       Social History:     Social History     Socioeconomic History    Marital status: Single     Spouse name: Not on file    Number of children: Not on file    Years of education: Not on file    Highest education level: Not on file   Occupational History    Not on file   Tobacco Use    Smoking status: Never    Smokeless tobacco: Never   Substance and Sexual Activity    Alcohol use: Never    Drug use: Never    Sexual activity: Not on file   Other Topics Concern    Parent/sibling w/ CABG, MI or angioplasty before 65F 55M? Not Asked   Social History Narrative    Not on file     Social Determinants of Health     Financial Resource Strain: Not on file   Food Insecurity: Not on file   Transportation Needs: Not on file   Physical Activity: Not on file   Stress: Not on file   Social Connections: Not on file   Intimate Partner Violence: Not on file   Housing Stability: Not on file           Physical Exam:   Ranges forvital signs:  Temp:  [97.6  F (36.4  C)-98.7  F (37.1  C)] 97.6  F (36.4  C)  Pulse:  [58-72] 72  Resp:  [16] 16  BP: (110-135)/(65-81) 125/72  SpO2:  [94 %-96 %] 94 %    Intake/Output Summary (Last 24 hours) at 9/18/2023 1340  Last data filed at 9/18/2023 1228  Gross per 24 hour   Intake 490 ml   Output --   Net 490 ml     Exam:   GENERAL:  well-developed, well-nourished, sitting in bed in no acute distress.   ENT:  Head is normocephalic, atraumatic.   EYES:  Improving and now minimal swelling and around right eye. Right conjunctiva with improving redness. Left eye and orbit normal.    NECK:  Supple.  LUNGS:  Breathing easily on room air  ABDOMEN:  Nontender  EXT: Extremities warm and without edema.  SKIN:  No acute rashes.    NEUROLOGIC:  Grossly nonfocal.         Laboratory Data:     Reviewed.  Pertinent for:    Microbiology:  Culture   Date Value Ref Range Status   09/17/2023 No growth after 1 day  Preliminary   09/17/2023 No anaerobic organisms isolated after 1 day  Preliminary     Metabolic Studies       Recent Labs   Lab Test 09/19/23  0740 09/18/23  0512 09/17/23  1639 01/28/23  0023 11/05/21  1924 03/08/21  0000 03/06/21  0000 02/23/21  0000 05/04/20  1143 03/28/18  1942   NA  --  140 142 143 142  --   --   --  138 140   POTASSIUM  --  4.0 4.1 3.7 3.9 4.1 4.0   < > 4.0 3.7   CHLORIDE  --  106 107 106 111*  --   --   --  105 105   CO2  --  25 24 22 25  --   --   --  27 26   ANIONGAP  --  9 11 15 6  --   --   --  6 9   BUN  --  15.0 12.3 12.0 16  --   --   --  21 11   CR 1.00 1.04 0.98 0.79 0.97 0.99 0.88   < > 0.99 0.74   GFRESTIMATED >90 >90 >90 >90 >90 >90 >90   < > >90 >90   GLC  --  92 94 102* 82 100 93   < > 88 276*   MARKEL  --  9.1 9.4 9.0 8.8  --   --   --  9.2 8.5    < > = values in this interval not displayed.     Hematology Studies      Recent Labs   Lab Test 09/18/23  0512 09/17/23  1639 01/28/23  0023 05/04/20  1143 03/28/18  1942   WBC 5.8 7.5 7.5 6.3 5.2   ANEU   --   --   --  3.8 3.4   ALYM  --   --   --  1.6 1.4   SHAMEKA  --   --   --  0.7 0.2   AEOS  --   --   --  0.1 0.1   HGB 13.8 14.2 13.6 14.4 13.3   HCT 40.7 42.8 41.1 43.1 41.2    180 199 161 154         Latest Ref Rng & Units 9/19/2023     7:40 AM 9/18/2023     5:12 AM 9/17/2023     4:39 PM 1/28/2023    12:23 AM 11/5/2021     7:24 PM   Transplant Immunosuppression Labs   Creat 0.67 - 1.17 mg/dL 1.00  1.04  0.98  0.79  0.97    Urea Nitrogen 7 - 30 mg/dL     16    Urea Nitrogen 6.0 - 20.0 mg/dL  15.0  12.3  12.0     WBC 4.0 - 11.0 10e3/uL  5.8  7.5  7.5     Neutrophil %   64  64            Imaging:   CT Orbits w Contrast  Result Date: 9/18/2023  IMPRESSION: Question mild soft tissue thickening and enhancement in the right periorbital soft tissue. This may suggest preseptal cellulitis. The left orbit is unremarkable. I have personally reviewed the examination and initial interpretation and I agree with the findings. JACK VALDES MD   SYSTEM ID:  [id number]

## 2023-09-20 ENCOUNTER — PATIENT OUTREACH (OUTPATIENT)
Dept: CARE COORDINATION | Facility: CLINIC | Age: 38
End: 2023-09-20
Payer: COMMERCIAL

## 2023-09-20 NOTE — PROGRESS NOTES
Clinic Care Coordination Contact  [facility]: Post-Discharge Note  SITUATION                                                      Admission:    Admission Date: 09/17/23   Reason for Admission: Eye infection, right  Discharge:   Discharge Date: 09/19/23  Discharge Diagnosis: Eye infection, right    BACKGROUND                                                      Per hospital discharge summary and inpatient provider notes:  enrique is a 37 y/o man who has a past medical history significant for obesity s/p bariatric surgery, epilepsy, migraines, generalized anxiety disorder, major depressive disorder, ADHD and past pancreatitis. Patient had surgery on his right eye on 11-Sep-2023 for strabismus. Patient returned to Ophthalmologist on 15-Sep-2023 with increased pain and swelling around right eye and was started on amoxicillin/clavulanate. Symptoms did not improve and patient presented on 17-Sep-2023 to [facility]. Patient was then admitted here for possible orbital cellulitis.     1.) Right eye pain and swelling with concern for orbital cellulitis:  - Awaiting results of CT orbits.  - Patient has been seen by Ophthalmology.   - Patient has been started on IV vancomycin and IV zosyn. Patient also on erythromycin ointment and moxifloxacin eyedrops.  - Patient to be seen by Infectious Disease.     2.) Migraines:  - Patient usually on emgality as outpatient but apparently missed his last dose.  - Patient to continue topamax. Patient on sumatriptan as needed.  - Patient to f/u with Neurology as outpatient.     3.) Epilepsy - last seizure reported 1 1/2 to 2 years ago:  - Monitoring for seizure activity.  - Diazepam as needed.  - Patient also on lamotrigine and topiramate.     4.) Obesity s/p bariatric surgery:  - On supplements.  - To f/u as outpatient.     5.) Major depressive disorder, generalized anxiety disorder:  - Patient on sertraline, lamotrigine and hydroxyzine.    ASSESSMENT           Discharge  "Assessment  How are you doing now that you are home?: \" All is good\"  How are your symptoms? (Red Flag symptoms escalate to triage hotline per guidelines): Improved  Do you feel your condition is stable enough to be safe at home until your provider visit?: Yes  Does the patient have their discharge instructions? : Yes  Does the patient have questions regarding their discharge instructions? : No  Were you started on any new medications or were there changes to any of your previous medications? : Yes  Does the patient have all of their medications?: Yes  Do you have questions regarding any of your medications? : No  Do you have all of your needed medical supplies or equipment (DME)?  (i.e. oxygen tank, CPAP, cane, etc.): Yes  Discharge follow-up appointment scheduled within 14 calendar days? : Yes  Discharge Follow Up Appointment Date: 09/22/23  Discharge Follow Up Appointment Scheduled with?: Specialty Care Provider    Post-op ([facility] Only)  If the patient had a surgery or procedure, do they have any questions for a nurse?: No             PLAN                                                      Outpatient Plan:  Your activity upon discharge: activity as tolerated  Follow this diet upon discharge: Orders Placed This Encounter  Combination Diet Regular Diet Adult  Follow Up ([facility])  Please follow up with Ophthalmology on September 22nd, 2023  (already scheduled)  Please follow up with Neurology on September 25th, 2023 (already  scheduled)  Appointments on [place] and/or [facility] (with [facility] or  [facility] provider or service). Call [phone] if you haven't heard  regarding these appointments within 7 days of discharge.    Future Appointments   Date Time Provider Department Center   9/22/2023  8:00 AM Keny Howe MD [facility]   10/30/2023 11:30 AM [facility]   11/3/2023  8:30 AM Lesly Snyder MD [facility]         For any urgent concerns, please contact " our 24 hour nurse triage line: [phone] ([phone])         Adelaida Stout MA

## 2023-09-21 NOTE — PROGRESS NOTES
1. 2 week post-op status post strabismus surgery:     Surgery 9/11/23:     PREOPERATIVE DIAGNOSIS (ES):  Large angle alternating esotropia     POSTOPERATIVE DIAGNOSIS (ES):  Same     NAME OF OPERATION:  Right medial rectus resection 9.0 mm  Left medial rectus resection 9.0 mm    Admitted to [facility] from 9/17/23 through 9/19/23.  IV vancomycin and zosyn initially then switched to rocephin then upon discharge sent home on augmentin 875/125 twice a day for 14 days.  Also on prednisolone acetate 1% four times a day in both eyes. Vigamox four times a day right eye. Right eye was doing well without any tearing, pain or swelling until yesterday when he noticed return of tearing and eye pain OD.  Resolved pain with tylenol    - Alignment: mild medial restriction each eye   - Diplopia: none   - appears to be healing well but having return of tearing since yesterday. I do not see signs of worsening or  treatment refractory infection today.    Culture grew (anaerobic) cutibacterium acnes.  This was penicillin and cefotaxime susceptible    Plan:  Continue:  Augmentin twice a day for complete 14 day course  Vigamox four times a day  in the right eye   Prednisolone acetate 1% three times a day in both eyes   Follow-up in 1 week (next Friday)         Dilcia Harris MD   Fellow, Neuro-Ophthalmology     Complete documentation of historical and exam elements from today's encounter can be found in the full encounter summary report (not reduplicated in this progress note).  I personally obtained the chief complaint(s) and history of present illness.  I confirmed and edited as necessary the review of systems, past medical/surgical history, family history, social history, and examination findings as documented by others; and I examined the patient myself.  I personally reviewed the relevant tests, images, and reports as documented above.  I formulated and edited as necessary the assessment and plan and discussed the findings  and management plan with the patient and family     Keny Howe MD

## 2023-09-22 ENCOUNTER — OFFICE VISIT (OUTPATIENT)
Dept: OPHTHALMOLOGY | Facility: CLINIC | Age: 38
End: 2023-09-22
Attending: OPHTHALMOLOGY
Payer: COMMERCIAL

## 2023-09-22 DIAGNOSIS — H44.001 EYE INFECTION, RIGHT: Primary | ICD-10-CM

## 2023-09-22 LAB
BACTERIA TISS BX CULT: NO GROWTH
GRAM STAIN RESULT: NORMAL
GRAM STAIN RESULT: NORMAL

## 2023-09-22 PROCEDURE — G0463 HOSPITAL OUTPT CLINIC VISIT: HCPCS | Performed by: OPHTHALMOLOGY

## 2023-09-22 PROCEDURE — 99024 POSTOP FOLLOW-UP VISIT: CPT | Mod: GC | Performed by: OPHTHALMOLOGY

## 2023-09-22 ASSESSMENT — REFRACTION_WEARINGRX
OS_SPHERE: -3.00
OS_CYLINDER: +1.50
OD_SPHERE: -1.75
OD_AXIS: 085
OD_CYLINDER: +1.00
SPECS_TYPE: SVL
OS_AXIS: 088

## 2023-09-22 ASSESSMENT — SLIT LAMP EXAM - LIDS: COMMENTS: NORMAL

## 2023-09-22 ASSESSMENT — VISUAL ACUITY
CORRECTION_TYPE: GLASSES
OS_CC+: -2
OD_CC+: -1
OD_CC: 20/50
METHOD: SNELLEN - LINEAR
OS_CC: 20/25

## 2023-09-22 ASSESSMENT — EXTERNAL EXAM - RIGHT EYE: OD_EXAM: NORMAL

## 2023-09-22 ASSESSMENT — TONOMETRY
OS_IOP_MMHG: 16
IOP_METHOD: ICARE
OD_IOP_MMHG: 13

## 2023-09-22 ASSESSMENT — EXTERNAL EXAM - LEFT EYE: OS_EXAM: NORMAL

## 2023-09-22 NOTE — NURSING NOTE
Chief Complaint(s) and History of Present Illness(es)       Post Op (Ophthalmology) Both Eyes    In both eyes.  Since onset it is stable.  Associated symptoms include eye pain, tearing and itching.  Negative for double vision.  Pain was noted as 2/10.             Comments    Javi Brown is a 38 year old male here for a 1-2 week post-op follow-up following strabismus surgery for large angle alt ET.    Date of surgery: 09/11/23  NAME OF OPERATION:  1. Right lateral rectus resection 9.0 mm  2. Left lateral rectus resection 9.0 mm  Patient was recently seen multiple times by Dr. Silvio Morgan for cellulitis of right orbital region following his surgery.  Javi says he woke up this morning and he had some eye pain in both eyes, lateral aspect.  Right eye started to water upon waking up this morning.  He denies double vision.  SUNIL Rahman 9/22/2023 8:16 AM

## 2023-09-24 LAB — BACTERIA TISS BX CULT: ABNORMAL

## 2023-09-28 ENCOUNTER — TELEPHONE (OUTPATIENT)
Dept: OPHTHALMOLOGY | Facility: CLINIC | Age: 38
End: 2023-09-28
Payer: COMMERCIAL

## 2023-09-28 NOTE — TELEPHONE ENCOUNTER
9/28/2023 12:03PM Javi would like a little bit later appointment time on 9/29/2023. I do not see a later appointment available on the schedule, but will ask Ranjeet to call him back. Javi states he needs to know by the time he leaves work today at 2:30.

## 2023-09-29 ENCOUNTER — OFFICE VISIT (OUTPATIENT)
Dept: OPHTHALMOLOGY | Facility: CLINIC | Age: 38
End: 2023-09-29
Attending: OPHTHALMOLOGY
Payer: COMMERCIAL

## 2023-09-29 DIAGNOSIS — H44.001 EYE INFECTION, RIGHT: Primary | ICD-10-CM

## 2023-09-29 DIAGNOSIS — H50.05 ALTERNATING ESOTROPIA: ICD-10-CM

## 2023-09-29 PROCEDURE — 99024 POSTOP FOLLOW-UP VISIT: CPT | Performed by: OPHTHALMOLOGY

## 2023-09-29 PROCEDURE — G0463 HOSPITAL OUTPT CLINIC VISIT: HCPCS | Performed by: OPHTHALMOLOGY

## 2023-09-29 ASSESSMENT — VISUAL ACUITY
METHOD: SNELLEN - LINEAR
OD_CC: 20/25
OS_CC: 20/20

## 2023-09-29 ASSESSMENT — TONOMETRY
IOP_METHOD: ICARE
OS_IOP_MMHG: 15
OD_IOP_MMHG: 14

## 2023-09-29 ASSESSMENT — SLIT LAMP EXAM - LIDS
COMMENTS: NORMAL
COMMENTS: NORMAL

## 2023-09-29 ASSESSMENT — EXTERNAL EXAM - RIGHT EYE: OD_EXAM: NORMAL

## 2023-09-29 ASSESSMENT — EXTERNAL EXAM - LEFT EYE: OS_EXAM: NORMAL

## 2023-09-29 NOTE — NURSING NOTE
Chief Complaint(s) and History of Present Illness(es)       Post Op (Ophthalmology) Both Eyes    In both eyes. Additional comments: Javi Brown is a 38 year old male here for a 2 week post-op follow-up following strabismus surgery for large angle alt ET.  Here for follow up on right eye infection.  Javi reports no eye pain or swelling.  Reports no eye tearing either.     Augmentin twice a day for complete 14 day course  Vigamox four times a day  in the right eye   Prednisolone acetate 1% three times a day in both eyes     SUNIL Rahman 9/29/2023 10:26 AM

## 2023-09-29 NOTE — PROGRESS NOTES
Post-op strabismus surgery in both eyes  Preseptal cellulitis / post strabismus surgery infection right eye    Doing well today. Infection appears adequately treated. Injection today in both eyes appears typical for post strabismus surgery.  Eye pain in the right eye from infection appears resolved.     Plan:  Continue:  Augmentin BID to complete course.  Pred taper twice a day for 1 week then daily for 1 week then stop.  Vigamox four times a day until gone    Historical data including HPI from initial visit:  Javi Brown is a 37 year old male with a PMH of seizures and congenital strabismus presenting for evaluation for strabismus surgery. He had had strabismus surgery as a child and did patching. He has a large angle esotropia that is cosmetically bothersome to him at times and so he presented for surgical evaluation. He reports he had surgery between 1-1 years old; he believes his eye turned in prior to surgery.  He seldom has double vision (rarely when tired).  He does note that occasionally he has some difficulty focusing and feels as though the image he sees is smaller than it should be (for example, when watching TV).   He reports longstanding migraine headaches.  Typically left-sided, beginning behind his eye. Since about 2021, his migraines have significantly worsened.  Currently managed with Emgality and Imitrex, which has been somewhat helpful.    Surgery on 9/11/2023, found to have pre-septal cellulitis of the right eye. Admitted to [facility] from 9/17/23 through 9/19/23. IV vancomycin and zosyn initially then switched to rocephin then upon discharge sent home on augmentin 875/125 twice a day for 14 days.      Interim history and Exam since last visit on 9/22/2023  Still taking Augmentin BID, Pred TID day, Vigamox QID. No eye pain 0/10. No changes in vision. Denies itchiness or grittiness of the eyes.    - Alignment: Doing great- no horizontal strabismus at distance and small esotropia at near  -  Diplopia: none     Please see epic chart for complete exam        Mitch Metzger, M4  [facility]    Complete documentation of historical and exam elements from today's encounter can be found in the full encounter summary report (not reduplicated in this progress note).  I personally re-obtained the chief complaint(s) and history of present illness.  I confirmed and edited as necessary the review of systems, past medical/surgical history, family history, social history, and examination findings as documented by others; and I examined the patient myself.  I personally reviewed the relevant tests, images, and reports as documented above.  I formulated and edited as necessary the assessment and plan and discussed the findings and management plan with the patient and family     A medical student was involved in the care of the patient. I was present with the medical student who participated in the service and in the documentation of the note. I have  verified the history and personally performed the physical exam and medical decision making. I extensively reviewed and edited when necessary the assessment and plan. I agree with the assessment and plan of care as documented in the note    Keny Howe MD

## 2023-10-12 ENCOUNTER — OFFICE VISIT (OUTPATIENT)
Dept: OPHTHALMOLOGY | Facility: CLINIC | Age: 38
End: 2023-10-12
Attending: OPHTHALMOLOGY
Payer: COMMERCIAL

## 2023-10-12 DIAGNOSIS — H50.05 ALTERNATING ESOTROPIA: Primary | ICD-10-CM

## 2023-10-12 PROCEDURE — G0463 HOSPITAL OUTPT CLINIC VISIT: HCPCS | Performed by: OPHTHALMOLOGY

## 2023-10-12 PROCEDURE — 99024 POSTOP FOLLOW-UP VISIT: CPT | Mod: GC | Performed by: OPHTHALMOLOGY

## 2023-10-12 ASSESSMENT — REFRACTION_WEARINGRX
OD_AXIS: 085
SPECS_TYPE: SVL
OS_CYLINDER: +1.50
OS_SPHERE: -3.00
OS_AXIS: 088
OD_SPHERE: -1.75
OD_CYLINDER: +1.00

## 2023-10-12 ASSESSMENT — TONOMETRY
OD_IOP_MMHG: 12
OS_IOP_MMHG: 11
IOP_METHOD: ICARE

## 2023-10-12 ASSESSMENT — VISUAL ACUITY
OS_CC: 20/20
OD_CC: 20/20
METHOD: SNELLEN - LINEAR
CORRECTION_TYPE: GLASSES

## 2023-10-12 ASSESSMENT — SLIT LAMP EXAM - LIDS
COMMENTS: NORMAL
COMMENTS: NORMAL

## 2023-10-12 ASSESSMENT — EXTERNAL EXAM - RIGHT EYE: OD_EXAM: NORMAL

## 2023-10-12 ASSESSMENT — EXTERNAL EXAM - LEFT EYE: OS_EXAM: NORMAL

## 2023-10-12 NOTE — PROGRESS NOTES
Post-op strabismus surgery in both eyes    Right lateral rectus resection 9.0 mm and Left lateral rectus resection 9.0 (9/11/2023)           Preseptal cellulitis / post strabismus surgery infection right eye -resolved    Doing well today. Infection completely resolved. Minimal temporal residual injection today in both eyes appears typical for post strabismus surgery.  No significant eye pain    Plan:  Okay to stop PF drops   RTC 3-4 month or sooner PRN     Historical data including HPI from initial visit:  Javi Brown is a 37 year old male with a PMH of seizures and congenital strabismus presenting for evaluation for strabismus surgery. He had had strabismus surgery as a child and did patching. He has a large angle esotropia that is cosmetically bothersome to him at times and so he presented for surgical evaluation. He reports he had surgery between 1-2 years old; he believes his eye turned in prior to surgery.  He seldom has double vision (rarely when tired).  He does note that occasionally he has some difficulty focusing and feels as though the image he sees is smaller than it should be (for example, when watching TV).   He reports longstanding migraine headaches.  Typically left-sided, beginning behind his eye. Since about 2021, his migraines have significantly worsened.  Currently managed with Emgality and Imitrex, which has been somewhat helpful.    Surgery on 9/11/2023, found to have pre-septal cellulitis of the right eye. Admitted to [facility] from 9/17/23 through 9/19/23. IV vancomycin and zosyn initially then switched to rocephin then upon discharge sent home on augmentin 875/125 twice a day for 14 days.      Interim history and Exam since last visit on 9/29/2023  Now off augmentin, currently using PF daily when he remembers, off vigamox now. No eye pain 0/10. Patient states that he feels like his eyes are dry. Has some pulling sensation when he looks in lateral gazes each eye. Denies double vision.       - Alignment: Doing great- left hypertropia small angle at near and distance, small esotropia at near   - Diplopia: none          Dilcia Harris MD   Fellow, Neuro-Ophthalmology     Complete documentation of historical and exam elements from today's encounter can be found in the full encounter summary report (not reduplicated in this progress note).  I personally obtained the chief complaint(s) and history of present illness.  I confirmed and edited as necessary the review of systems, past medical/surgical history, family history, social history, and examination findings as documented by others; and I examined the patient myself.  I personally reviewed the relevant tests, images, and reports as documented above.  I formulated and edited as necessary the assessment and plan and discussed the findings and management plan with the patient and family     Keny Howe MD

## 2023-10-12 NOTE — NURSING NOTE
Chief Complaint(s) and History of Present Illness(es)       Post Op (Ophthalmology) Both Eyes    In both eyes. Additional comments: 1. Post-op strabismus surgery in both eyes  2. Preseptal cellulitis / post strabismus surgery infection right eye  Javi reports eyes no more eye pain.  He is done  with all his medications.  SUNIL Rahman 10/12/2023 3:29 PM

## 2023-10-30 ENCOUNTER — OFFICE VISIT (OUTPATIENT)
Dept: NEUROLOGY | Facility: CLINIC | Age: 38
End: 2023-10-30
Payer: COMMERCIAL

## 2023-10-30 VITALS
RESPIRATION RATE: 16 BRPM | SYSTOLIC BLOOD PRESSURE: 110 MMHG | HEART RATE: 70 BPM | DIASTOLIC BLOOD PRESSURE: 78 MMHG | OXYGEN SATURATION: 99 %

## 2023-10-30 DIAGNOSIS — G43.709 CHRONIC MIGRAINE WITHOUT AURA WITHOUT STATUS MIGRAINOSUS, NOT INTRACTABLE: Primary | ICD-10-CM

## 2023-10-30 PROCEDURE — 99214 OFFICE O/P EST MOD 30 MIN: CPT | Performed by: PSYCHIATRY & NEUROLOGY

## 2023-10-30 RX ORDER — FREMANEZUMAB-VFRM 225 MG/1.5ML
1.5 INJECTION SUBCUTANEOUS
Qty: 1.5 ML | Refills: 11 | Status: SHIPPED | OUTPATIENT
Start: 2023-10-30 | End: 2023-10-30

## 2023-10-30 RX ORDER — RIZATRIPTAN BENZOATE 10 MG/1
10 TABLET, ORALLY DISINTEGRATING ORAL
Qty: 18 TABLET | Refills: 11 | Status: SHIPPED | OUTPATIENT
Start: 2023-10-30 | End: 2024-05-06

## 2023-10-30 RX ORDER — FREMANEZUMAB-VFRM 225 MG/1.5ML
1.5 INJECTION SUBCUTANEOUS
Qty: 1.5 ML | Refills: 11 | Status: SHIPPED | OUTPATIENT
Start: 2023-10-30 | End: 2024-05-06

## 2023-10-30 NOTE — NURSING NOTE
Chief Complaint   Patient presents with    RECHECK     Here for a follow up, confirmed with patient     Lizet Whiteside

## 2023-10-30 NOTE — LETTER
10/30/2023       RE: Javi Brown  [address] [place] [address]     Dear Colleague,    Thank you for referring your patient, Javi Brown, to the [facility] [place] at [facility]. Please see a copy of my visit note below.    [facility]    Headache Neurology Progress Note  October 30, 2023    Subjective:    Javi Brown returns for follow up of chronic migraine.    Emgality stopped working as well. He stopped taking it.     Imitrex subcutaneous as needed is causing side effects. He reports feeling burning or tingling over his arms and head. The last three times he took, this was particularly severe, and worsened the headache pain.    Headaches is pulsating, throbbing and severe. He will moan in pain when it is very severe. He estimates he has 16-24 headache days per month.     He tried Tylenol 3, which was somewhat helpful.     He also had strabismus surgery, complicated by infection.     Objective:    Vitals: /78 (BP Location: Right arm, Patient Position: Sitting, Cuff Size: Adult Regular)   Pulse 70   Resp 16   SpO2 99%   General: Cooperative, NAD  Neurologic:  Mental Status: Fully alert, attentive and oriented. Speech clear and fluent.   Cranial Nerves: Facial movements symmetric.   Motor: No abnormal movements.      Pertinent Investigations:    MRI brain w and wo contrast (4/21/2023):  1. T2 hyperintense appearance of the left hippocampus suggesting  mesial temporal sclerosis.  2. Unchanged small focus of right lateral frontal encephalomalacia.  3. No evidence for intracranial hemorrhage, mass, acute infarct or  focal mass lesion.    Assessment/Plan:   Javi Brown is a 38 year old man with epileptic and nonepileptic seizures, and chronic migraine who returns for follow-up.  Headaches remain frequent and severe, and are not as responsive to Emgality subcutaneous  injection.     We obtained an updated MRI of the brain to evaluate for potential secondary causes of headache.  This returned reassuring.  There continues to be medial temporal sclerosis on the left and right lateral frontal encephalomalacia.  These are not thought to be contributing to headache.    We discussed trialing alternative rescue and preventative therapies for migraine.  - For acute treatment, I recommend a trial of rizatriptan 10 mg taken at the onset of headache, with a repeat dose in 2 hours if needed.  This should not exceed more than 9 days/month to avoid medication overuse.  -This will be used as a substitute for subcutaneous sumatriptan.    His headache frequency and severity warrant prevention.  Previously Emgality had been helpful.  We will trial a different CGRP inhibitor, Ajovy.  - I recommend a trial of Ajovy subcutaneous injection every 30 days.  We will attempt to get preauthorization.  I recommend a 3 to 6-month trial prior to determining effectiveness.  Potential side effects were discussed.    -Botulinum toxin injections or an alternative CGRP inhibitor will be considered in the future, if needed.    -Would avoid beta-blockers given depression history. Would also avoid TCA's as they are known to lower the seizure threshold and would be concerned for excess side-effects w/ his concurrent medications.      I will plan to see him back in 3-6 months to monitor his progress.         Again, thank you for allowing me to participate in the care of your patient.      Sincerely,    Swati Hernandez MD

## 2023-10-30 NOTE — PROGRESS NOTES
[facility]    Headache Neurology Progress Note  October 30, 2023    Subjective:    Javi Brown returns for follow up of chronic migraine.    Emgality stopped working as well. He stopped taking it.     Imitrex subcutaneous as needed is causing side effects. He reports feeling burning or tingling over his arms and head. The last three times he took, this was particularly severe, and worsened the headache pain.    Headaches is pulsating, throbbing and severe. He will moan in pain when it is very severe. He estimates he has 16-24 headache days per month.     He tried Tylenol 3, which was somewhat helpful.     He also had strabismus surgery, complicated by infection.     Objective:    Vitals: /78 (BP Location: Right arm, Patient Position: Sitting, Cuff Size: Adult Regular)   Pulse 70   Resp 16   SpO2 99%   General: Cooperative, NAD  Neurologic:  Mental Status: Fully alert, attentive and oriented. Speech clear and fluent.   Cranial Nerves: Facial movements symmetric.   Motor: No abnormal movements.      Pertinent Investigations:    MRI brain w and wo contrast (4/21/2023):  1. T2 hyperintense appearance of the left hippocampus suggesting  mesial temporal sclerosis.  2. Unchanged small focus of right lateral frontal encephalomalacia.  3. No evidence for intracranial hemorrhage, mass, acute infarct or  focal mass lesion.    Assessment/Plan:   Javi Brown is a 38 year old man with epileptic and nonepileptic seizures, and chronic migraine who returns for follow-up.  Headaches remain frequent and severe, and are not as responsive to Emgality subcutaneous injection.     We obtained an updated MRI of the brain to evaluate for potential secondary causes of headache.  This returned reassuring.  There continues to be medial temporal sclerosis on the left and right lateral frontal encephalomalacia.  These are not thought to be contributing to headache.    We discussed trialing  alternative rescue and preventative therapies for migraine.  - For acute treatment, I recommend a trial of rizatriptan 10 mg taken at the onset of headache, with a repeat dose in 2 hours if needed.  This should not exceed more than 9 days/month to avoid medication overuse.  -This will be used as a substitute for subcutaneous sumatriptan.    His headache frequency and severity warrant prevention.  Previously Emgality had been helpful.  We will trial a different CGRP inhibitor, Ajovy.  - I recommend a trial of Ajovy subcutaneous injection every 30 days.  We will attempt to get preauthorization.  I recommend a 3 to 6-month trial prior to determining effectiveness.  Potential side effects were discussed.    -Botulinum toxin injections or an alternative CGRP inhibitor will be considered in the future, if needed.    -Would avoid beta-blockers given depression history. Would also avoid TCA's as they are known to lower the seizure threshold and would be concerned for excess side-effects w/ his concurrent medications.      I will plan to see him back in 3-6 months to monitor his progress.     Swati Hernandez MD  Neurology

## 2023-10-31 ENCOUNTER — TELEPHONE (OUTPATIENT)
Dept: NEUROLOGY | Facility: CLINIC | Age: 38
End: 2023-10-31
Payer: COMMERCIAL

## 2023-10-31 NOTE — TELEPHONE ENCOUNTER
Prior Authorization Retail Medication Request    Medication/Dose: Ajovy 225 mg every 30 days  ICD code (if different than what is on RX):    Previously Tried and Failed:  Emgality not effective, Would avoid beta-blockers given depression history. Would also avoid TCA's as they are known to lower the seizure threshold and would be concerned for excess side-effects w/ his concurrent medications.      Rationale:   He estimates he has 16-24 headache days per month.     Insurance Name:  [employer]  Insurance ID:  [id number]      Pharmacy Information (if different than what is on RX)  Name:    Phone:

## 2023-11-02 NOTE — TELEPHONE ENCOUNTER
Central Prior Authorization Team   Phone: [phone]        PA Initiation    Medication: AJOVY 225 MG/1.5ML SC SOAJ  Insurance Company: [employer]-Specialty PA's - Phone [phone] Fax [phone]  Pharmacy Filling the Rx: [address]  Filling Pharmacy Phone: [phone]  Filling Pharmacy Fax: [phone]  Start Date: 11/2/2023

## 2023-11-02 NOTE — TELEPHONE ENCOUNTER
Central Prior Authorization Team   Phone: [phone]        Prior Authorization Not Needed per Insurance - REFILL TOO SOON - LAST FILLED ON 10/30/23 PER PHARMACY    Medication: AJOVY 225 MG/1.5ML SC SOAJ  Insurance Company: [employer]-Specialty PA's - Phone [phone] Fax [phone]  Expected CoPay: $    Pharmacy Filling the Rx: [facility] - [place], [place] - [address] [place] [address]  Pharmacy Notified: YES  Patient Notified: NO

## 2024-01-23 ENCOUNTER — VIRTUAL VISIT (OUTPATIENT)
Dept: NEUROLOGY | Facility: CLINIC | Age: 39
End: 2024-01-23
Payer: COMMERCIAL

## 2024-01-23 VITALS — WEIGHT: 265 LBS | HEIGHT: 70 IN | BODY MASS INDEX: 37.94 KG/M2

## 2024-01-23 DIAGNOSIS — R56.9 SEIZURE (H): Primary | ICD-10-CM

## 2024-01-23 DIAGNOSIS — G43.709 CHRONIC MIGRAINE WITHOUT AURA WITHOUT STATUS MIGRAINOSUS, NOT INTRACTABLE: ICD-10-CM

## 2024-01-23 RX ORDER — TOPIRAMATE 100 MG/1
100 TABLET, FILM COATED ORAL 2 TIMES DAILY
Qty: 180 TABLET | Refills: 3 | Status: SHIPPED | OUTPATIENT
Start: 2024-01-23

## 2024-01-23 RX ORDER — LAMOTRIGINE 100 MG/1
300 TABLET, ORALLY DISINTEGRATING ORAL 2 TIMES DAILY
Qty: 540 TABLET | Refills: 3 | Status: SHIPPED | OUTPATIENT
Start: 2024-01-23

## 2024-01-23 ASSESSMENT — PAIN SCALES - GENERAL: PAINLEVEL: NO PAIN (0)

## 2024-01-23 ASSESSMENT — PATIENT HEALTH QUESTIONNAIRE - PHQ9: SUM OF ALL RESPONSES TO PHQ QUESTIONS 1-9: 0

## 2024-01-23 NOTE — NURSING NOTE
Is the patient currently in the state of [place]? YES    Visit mode:VIDEO    If the visit is dropped, the patient can be reconnected by: VIDEO VISIT: Send to e-mail at: [email or web address]    Will anyone else be joining the visit? NO  (If patient encounters technical issues they should call [phone]    How would you like to obtain your AVS? MyChart    Are changes needed to the allergy or medication list? No    Reason for visit: LEILANI BANDA
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
## 2024-01-23 NOTE — PATIENT INSTRUCTIONS
Ambulatory EEG 24, I have placed an order. He needs to find a ride    Continue  Lamotrigine  mg twice a day and topiramate 100 mg twice a day     We can not use lamotrigine XR due to his need for ODT (gastric surgery and gastric pouch)    Diazepam: Give 1 ml (5mg): Give 1 ml for seizures greater 5 minutes or more than 2 seizures within an 8 hour period. May repeat once 1 ml (5mg). Monitor breathing, if there any concerns call 911. Do not exceed 10 mg per day.     Follow up with Tanisha TRUJILLO 4-8 weeks and then Claudio in 4 months     Emotional health follow up with therapist     Migraine management with [facility]     Do not drive if you have episodes of loss of awareness per [place] law    Lesly Snyder MD

## 2024-01-24 ENCOUNTER — OFFICE VISIT (OUTPATIENT)
Dept: OPHTHALMOLOGY | Facility: CLINIC | Age: 39
End: 2024-01-24
Attending: OPHTHALMOLOGY
Payer: COMMERCIAL

## 2024-01-24 DIAGNOSIS — H50.05 ALTERNATING ESOTROPIA: Primary | ICD-10-CM

## 2024-01-24 DIAGNOSIS — H53.2 DOUBLE VISION: ICD-10-CM

## 2024-01-24 PROCEDURE — 92015 DETERMINE REFRACTIVE STATE: CPT

## 2024-01-24 PROCEDURE — 99213 OFFICE O/P EST LOW 20 MIN: CPT | Performed by: OPHTHALMOLOGY

## 2024-01-24 PROCEDURE — 92060 SENSORIMOTOR EXAMINATION: CPT | Performed by: OPHTHALMOLOGY

## 2024-01-24 PROCEDURE — 92060 SENSORIMOTOR EXAMINATION: CPT | Mod: 26 | Performed by: OPHTHALMOLOGY

## 2024-01-24 PROCEDURE — 92012 INTRM OPH EXAM EST PATIENT: CPT | Mod: GC | Performed by: OPHTHALMOLOGY

## 2024-01-24 ASSESSMENT — CONF VISUAL FIELD
OS_SUPERIOR_NASAL_RESTRICTION: 0
METHOD: COUNTING FINGERS
OS_SUPERIOR_TEMPORAL_RESTRICTION: 0
OD_SUPERIOR_TEMPORAL_RESTRICTION: 0
OD_SUPERIOR_NASAL_RESTRICTION: 0
OD_INFERIOR_NASAL_RESTRICTION: 0
OD_NORMAL: 1
OS_INFERIOR_TEMPORAL_RESTRICTION: 0
OS_NORMAL: 1
OS_INFERIOR_NASAL_RESTRICTION: 0
OD_INFERIOR_TEMPORAL_RESTRICTION: 0

## 2024-01-24 ASSESSMENT — REFRACTION_WEARINGRX
OS_SPHERE: -3.50
OD_SPHERE: -2.00
SPECS_TYPE: SVL
OD_CYLINDER: +1.00
OS_AXIS: 090
OS_CYLINDER: +2.00
OD_AXIS: 085

## 2024-01-24 ASSESSMENT — REFRACTION_MANIFEST
OS_SPHERE: -4.00
OD_AXIS: 090
OS_CYLINDER: +1.75
OD_SPHERE: -2.50
OS_AXIS: 090
OD_CYLINDER: +1.00

## 2024-01-24 ASSESSMENT — TONOMETRY
IOP_METHOD: ICARE B/B JC
OD_IOP_MMHG: 09
OS_IOP_MMHG: 10

## 2024-01-24 ASSESSMENT — VISUAL ACUITY
OS_CC: 20/20
OD_CC: 20/20
OS_CC+: -1
CORRECTION_TYPE: GLASSES
METHOD: SNELLEN - LINEAR

## 2024-01-24 ASSESSMENT — EXTERNAL EXAM - LEFT EYE: OS_EXAM: NORMAL

## 2024-01-24 ASSESSMENT — SLIT LAMP EXAM - LIDS
COMMENTS: NORMAL
COMMENTS: NORMAL

## 2024-01-24 ASSESSMENT — EXTERNAL EXAM - RIGHT EYE: OD_EXAM: NORMAL

## 2024-01-24 NOTE — PROGRESS NOTES
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  Complicated by preseptal cellulitis / post strabismus surgery infection right eye treated with a short course of IV antibiotics followed by an outpatient course of oral antibiotics and recovered without long-term consequences.    Today patient has an excellent outcome from his surgery.  He has a small angle residual esotropia with excellent reconstructive outcome from surgery.    Plan:  Okay to return to local eye doctor for annual exams  Return as needed for worsening strabismus in the future      Historical data including HPI from initial visit:  Javi Brown is a 37 year old male with a PMH of seizures and congenital strabismus presenting for evaluation for strabismus surgery. He had had strabismus surgery as a child and did patching. He has a large angle esotropia that is cosmetically bothersome to him at times and so he presented for surgical evaluation. He reports he had surgery between 1-2 years old; he believes his eye turned in prior to surgery.  He seldom has double vision (rarely when tired).  He does note that occasionally he has some difficulty focusing and feels as though the image he sees is smaller than it should be (for example, when watching TV).   He reports longstanding migraine headaches.  Typically left-sided, beginning behind his eye. Since about 2021, his migraines have significantly worsened.  Currently managed with Emgality and Imitrex, which has been somewhat helpful.    Surgery on 9/11/2023, found to have pre-septal cellulitis of the right eye. Admitted to [facility] from 9/17/23 through 9/19/23. IV vancomycin and zosyn initially then switched to rocephin then upon discharge sent home on augmentin 875/125 twice a day for 14 days. He completed his antibiotic course and followed up on 10/12/23 in clinic and cellulitis was resolved  and wound healing appropriate.     Interim history and Exam since last visit on 10/12/2023  At last visit, Javi was following up for post-surgical preseptal cellulitis. It was resolved and eye was healing appropriately. He was told to follow-up for routine 4 month post-surgery visit.     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 He underwent bilateral lateral rectus resection of 9.0 mm.     Today, he states he is doing well. No double vision. He is happy with the outcome. He notices that near vision requires him to focus more. It is blurry right away and he has to blink and fixate with one eye. Overall, not terribly bothersome.     Please see epic chart for complete exam          Kentrell Mooney MD  PGY-3 Ophthalmology    Complete documentation of historical and exam elements from today's encounter can be found in the full encounter summary report (not reduplicated in this progress note).  I personally obtained the chief complaint(s) and history of present illness.  I confirmed and edited as necessary the review of systems, past medical/surgical history, family history, social history, and examination findings as documented by others; and I examined the patient myself.  I personally reviewed the relevant tests, images, and reports as documented above.  I formulated and edited as necessary the assessment and plan and discussed the findings and management plan with the patient and family.  I personally reviewed the ophthalmic test(s) associated with this encounter, agree with the interpretation(s) as documented by the resident/fellow, and have edited the corresponding report(s) as necessary.     Keny Howe MD

## 2024-02-29 ENCOUNTER — OFFICE VISIT (OUTPATIENT)
Dept: NEUROLOGY | Facility: CLINIC | Age: 39
End: 2024-02-29
Payer: COMMERCIAL

## 2024-02-29 VITALS — TEMPERATURE: 97 F | HEART RATE: 74 BPM | SYSTOLIC BLOOD PRESSURE: 113 MMHG | DIASTOLIC BLOOD PRESSURE: 77 MMHG

## 2024-02-29 DIAGNOSIS — F44.5 PSYCHOGENIC NONEPILEPTIC SEIZURE: ICD-10-CM

## 2024-02-29 DIAGNOSIS — R56.9 SEIZURES (H): Primary | ICD-10-CM

## 2024-02-29 ASSESSMENT — PAIN SCALES - GENERAL: PAINLEVEL: NO PAIN (0)

## 2024-02-29 NOTE — PATIENT INSTRUCTIONS
Continue your current dosing of medications. If you miss up to 7 days worth of lamotrigine let us know, we would need to retitrate this due to risk of a life threatening rash.    Continue with your mental health team.    Keep your inpatient admission as scheduled. Keep your follow up as scheduled with Dr. Snyder.    Missed Doses:  IF YOU REALIZE WITHIN 24 HOURS:    ...You MISSED ONE DOSE of your anticonvulsant medication(s), take the missed dose immediately unless it is time for your next scheduled dose. If that is the case, take your next scheduled dose, wait two hours, and then take the missed dose.    ...You MISSED TWO OR MORE DOSES, take one of the missed doses immediately, even if it is time for your next scheduled dose. Then call the [facility] clinic to schedule an appointment.     IF YOU REALIZE NOW YOU MISSED A DOSE MORE THAN 24 HOURS AGO, lowell it on your calendar. DO NOT take an extra dose.    An extra dose of an antiepileptic drug is not serious. Ordinarily, at most, you may experience increased side effects for several hours.

## 2024-02-29 NOTE — PROGRESS NOTES
[facility] Epilepsy Care Progress Note      Patient:  Javi Brown  :  1985   Age:  38 year old   Today's Office Visit:  2024  Chief Complaint: Follow up seizures    Epilepsy Data:  Seizure started 2021 (age 35) he was walking to work had an aura and then he woke up with people standing around him. He was started on lamotrigine XR for psychiatric reasons.  [place] had EEG with ictal pattern EEG 2021: During the recording, the patient fell asleep spontaneously.  During sleep, there was activation of spikes over the central region(Cz). An electrographic seizure lasting around 30 seconds arising from the central region was seen. No clinical symptoms was noted during the seizure.       History of Present Illness:   Mr. Brown was last seen by Dr. Snyder on 2024. At the time of the patient's last visit he had focal seizures with impairment in awareness, and psychogenic nonepileptic spells (video EEG documented in 2021). Lamotrigine ODT was helpful in reducing nonepileptic spells in the setting of his gastric bypass surgery. He had new spells of hands shaking and increased staring spells. These were both suspected to be psychogenic. Weekly mental health follow up was recommended. A 24 hour ambulatory EEG was ordered. He was to continue lamotrigine ODT 300mg twice per day and 100mg twice per day topirimate. He was to follow up with myself in four to eight weeks and with Dr. Snyder in four months.    Today, Mr. Brown states he is doing ok. His suspected/known nonepileptic events act up more when he misses his lamotrigine and has increased stress.    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    He states his compliance has been better. He is working on this with going into the hospital for inpatient video EEG monitoring.     His disordered eating has been better.    Spell type 1: Non epileptic spell: staring with  "unresponsiveness. Spells last over 5 minutes, may have lower extremities or upper extremities shaking (low amplitude shaking). Last one was 11/2022. Non epileptic spell documented on Video EEG.  These are less frequent than at his last visit, but still increased overall.    Spell type 2: Recurrent spells of right (or left) hand flapping with opening and closing of hands. He has no loss of awareness with these spells and he is able to communicate. Spells occur in the evening towards the end of the week and he has no control over them. Sometimes this involuntary motor movement may be bilateral. Spells last a few minutes and occurs. I suspect these are non epileptic spell.  They occur in the evening, toward the end of the week, Thursday/Friday/Saturday. Occurs more with stress.    Seizure type 3: Focal seizure with no impairment in awareness: \"maria del carmen vu, dreamy, tingling on on face, weird feeling in chest of anxiety, I can feel like something is going on and I can not control it\". Last spell was end of April 2021. These usually are a few minutes.     Seizure type 4: Focal seizure with impairment in awareness : aura maria del carmen vu, dreamy feeling, tingling, progresses to loss of awareness, looks to ceiling with eye deviation, lip smacking. Last seizure  was 11/29/2022 (missed antiepileptic drug). These usually are a few minutes.     Seizure type 5: Generalized tonic-clonic convulsion: jaw gets tight, does not move, feet shake, arms clench up and flexes upper extremities , has loss of awareness, increased salivation, + tongue bite, no urinary incontinence. Total of 4 generalized tonic-clonic convulsion. Last generalized tonic-clonic convulsion was 11/29/2022 (missed antiepileptic drug for few weeks) and prior to this was 4/20/2021.       Current Outpatient Medications   Medication Sig Dispense Refill    acetaminophen 500 MG CAPS Take 500 mg by mouth as needed       cyanocobalamin (VITAMIN B-12) 1000 MCG SUBL sublingual tablet " Place 1,000 mcg under the tongue daily      diazepam (DIAZEPAM INTENSOL) 5 MG/ML (HIGH CONC) solution Diazepam: Give 1 ml (5mg): Give 1 ml for seizures greater 5 minutes or more than 2 seizures within an 8 hour period. May repeat once 1 ml (5mg). Monitor breathing, if there any concerns call 911. Do not exceed 10 mg per day. 30 mL 0    Fremanezumab-vfrm (AJOVY) 225 MG/1.5ML SOAJ Inject 225 mg Subcutaneous every 30 days 1.5 mL 11    hydrOXYzine (VISTARIL) 25 MG capsule Take 50 mg by mouth every evening Pt states taking as needed as well.      lamoTRIgine (LAMICTAL) 100 MG TBDP ODT tab Take 3 tablets (300 mg) by mouth 2 times daily 540 tablet 3    ondansetron (ZOFRAN ODT) 4 MG ODT tab Take 1 tablet (4 mg) by mouth every 8 hours as needed for nausea 18 tablet 11    Pediatric Multi Vit-Extra C-FA (FLINTSTONES/EXTRA C) CHEW Take 1 tablet by mouth every morning       rizatriptan (MAXALT-MLT) 10 MG ODT Take 1 tablet (10 mg) by mouth at onset of headache for migraine May repeat in 2 hours. Max 3 tablets/24 hours. 18 tablet 11    sertraline (ZOLOFT) 100 MG tablet Take 100 mg by mouth 2 times daily      SUMAtriptan (IMITREX STATDOSE) 6 MG/0.5ML pen injector kit Inject 0.5 mLs (6 mg) Subcutaneous at onset of headache for migraine (repeat in 2 hours if needed) Max 12 mg/24 hours. (Patient not taking: Reported on 1/23/2024) 9 kit 11    topiramate (TOPAMAX) 100 MG tablet Take 1 tablet (100 mg) by mouth 2 times daily 180 tablet 3    traZODone (DESYREL) 50 MG tablet Take  mg by mouth nightly as needed for sleep (after hydroxyzine)      vitamin D2 (ERGOCALCIFEROL) 27744 units (1250 mcg) capsule Take 1 capsule by mouth once a week (Patient not taking: Reported on 1/23/2024)      vitamin D3 (CHOLECALCIFEROL) 50 mcg (2000 units) tablet Take 1 tablet by mouth daily          Medication Notes:        AED Medication Compliance:  noncompliant some of the time - He misses at least one dose less than 7 days of the week, but more than 3  days per week. His morning dose is more problematic.  Using a pill box:  No - carries his meds with him. We set alarms on his phone for his medication today while in clinic.      Latest Reference Range & Units Most Recent   Lamotrigine 3.0 - 15.0 ug/mL <0.9 (L)  1/28/23 00:23   Topiramate Level 5.0 - 20.0 ug/mL <1.5 (L)  1/28/23 00:23       Review of Systems:  Lethargy / Tiredness:  Yes (excessive yawning with lamotrigine)  Double Vision:  No  Depression:  No  Poor Balance:  No  Dizziness:  No  Blurred Vision:  No  Have you experienced a traumatic fall since your last visit: NO  Are these falls related to your seizures: Not Applicable    Other Issues:    Is patient safe to drive:  No - [place] state driving laws discussed. States he is not driving    Exam:    /77 (BP Location: Right arm, Patient Position: Sitting, Cuff Size: Adult Large)   Pulse 74   Temp 97  F (36.1  C) (Temporal)      Wt Readings from Last 5 Encounters:   01/23/24 265 lb (120.2 kg)   09/11/23 250 lb (113.4 kg)   03/14/23 262 lb (118.8 kg)   12/13/22 260 lb (117.9 kg)   05/06/22 247 lb (112 kg)     General: General examination reveals a well developed male in no acute distress    Neurological Examination:    Mental Status: Alert and awake. Mood and affect were appropriate to situation. Memory appeared intact, not formally tested. Language without dysarthria.  Cranial Nerves:  II-XII grossly intact. EOMI without nystagmus.  Motor: Moves all four extremities spontaneously and against gravity.  Coordination: Finger-nose-finger testing was normal and without tremor or ataxia. Heel to shin intact bilaterally  Station and Gait: Station was normal based. Gait was normal with good stride, good arm swing and good turning. Negative Romberg.      Assessment and Plan:   Mr. Brown has history of focal seizures with impairment in awareness, EEG documented psychogenic non epileptic spells, anxiety, depression, PTSD, and history of bariatric surgery. He has  ongoing type 1 (documented NEE) and 2 (suspected NEE) spells since last seen. He has ongoing noncompliance with medications.    No changes in his medications were made today. We discussed the importance of taking his medications regularly. We spent time setting alarms on his phone during today's visit to assist with compliance. We reviewed risk for SJS if missing too many doses of his lamotrigine and going back to his full dose abruptly. Information on how to make up missed doses of medication were provided.    He was encouraged to continue working with his mental health team.    He will keep his inpatient video EEG monitoring to evaluate his spells of hand shaking and staring, and follow up with Dr. Snyder as scheduled. If he has questions, concerns, or worsening symptoms in the meantime he was encouraged to contact the clinic.    Thank you for letting me participate in your patient's care.    As described above, I met with the patient for 30 minutes and during this time counseling was greater than 50% of the visit time.

## 2024-02-29 NOTE — LETTER
2024       RE: Javi Brown  : 1985   MRN: [id number]      Dear Colleague,    Thank you for referring your patient, Javi Brown, to the [facility] EPILEPSY CARE at [facility]. Please see a copy of my visit note below.    [facility] Epilepsy Care Progress Note      Patient:  Javi Brown  :  1985   Age:  38 year old   Today's Office Visit:  2024  Chief Complaint: Follow up seizures    Epilepsy Data:  Seizure started 2021 (age 35) he was walking to work had an aura and then he woke up with people standing around him. He was started on lamotrigine XR for psychiatric reasons.  [place] had EEG with ictal pattern EEG 2021: During the recording, the patient fell asleep spontaneously.  During sleep, there was activation of spikes over the central region(Cz). An electrographic seizure lasting around 30 seconds arising from the central region was seen. No clinical symptoms was noted during the seizure.       History of Present Illness:   Mr. Brown was last seen by Dr. Snyder on 2024. At the time of the patient's last visit he had focal seizures with impairment in awareness, and psychogenic nonepileptic spells (video EEG documented in 2021). Lamotrigine ODT was helpful in reducing nonepileptic spells in the setting of his gastric bypass surgery. He had new spells of hands shaking and increased staring spells. These were both suspected to be psychogenic. Weekly mental health follow up was recommended. A 24 hour ambulatory EEG was ordered. He was to continue lamotrigine ODT 300mg twice per day and 100mg twice per day topirimate. He was to follow up with myself in four to eight weeks and with Dr. Snyder in four months.    Today, Mr. Brown states he is doing ok. His suspected/known nonepileptic events act up more when he misses his lamotrigine and has increased stress.    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    He states his compliance has been better. He is working on this with going into the hospital for inpatient video EEG monitoring.     His disordered eating has been better.    Spell type 1: Non epileptic spell: staring with unresponsiveness. Spells last over 5 minutes, may have lower extremities or upper extremities shaking (low amplitude shaking). Last one was 11/2022. Non epileptic spell documented on Video EEG.  These are less frequent than at his last visit, but still increased overall.    Spell type 2: Recurrent spells of right (or left) hand flapping with opening and closing of hands. He has no loss of awareness with these spells and he is able to communicate. Spells occur in the evening towards the end of the week and he has no control over them. Sometimes this involuntary motor movement may be bilateral. Spells last a few minutes and occurs. I suspect these are non epileptic spell.  They occur in the evening, toward the end of the week, Thursday/Friday/Saturday. Occurs more with stress.    Seizure type 3: Focal seizure with no impairment in awareness: \"maria del carmen vu, dreamy, tingling on on face, weird feeling in chest of anxiety, I can feel like something is going on and I can not control it\". Last spell was end of April 2021. These usually are a few minutes.     Seizure type 4: Focal seizure with impairment in awareness : aura maria del carmen vu, dreamy feeling, tingling, progresses to loss of awareness, looks to ceiling with eye deviation, lip smacking. Last seizure  was 11/29/2022 (missed antiepileptic drug). These usually are a few minutes.     Seizure type 5: Generalized tonic-clonic convulsion: jaw gets tight, does not move, feet shake, arms clench up and flexes upper extremities , has loss of awareness, increased salivation, + tongue bite, no urinary incontinence. Total of 4 generalized tonic-clonic convulsion. Last generalized tonic-clonic convulsion was " 11/29/2022 (missed antiepileptic drug for few weeks) and prior to this was 4/20/2021.       Current Outpatient Medications   Medication Sig Dispense Refill    acetaminophen 500 MG CAPS Take 500 mg by mouth as needed       cyanocobalamin (VITAMIN B-12) 1000 MCG SUBL sublingual tablet Place 1,000 mcg under the tongue daily      diazepam (DIAZEPAM INTENSOL) 5 MG/ML (HIGH CONC) solution Diazepam: Give 1 ml (5mg): Give 1 ml for seizures greater 5 minutes or more than 2 seizures within an 8 hour period. May repeat once 1 ml (5mg). Monitor breathing, if there any concerns call 911. Do not exceed 10 mg per day. 30 mL 0    Fremanezumab-vfrm (AJOVY) 225 MG/1.5ML SOAJ Inject 225 mg Subcutaneous every 30 days 1.5 mL 11    hydrOXYzine (VISTARIL) 25 MG capsule Take 50 mg by mouth every evening Pt states taking as needed as well.      lamoTRIgine (LAMICTAL) 100 MG TBDP ODT tab Take 3 tablets (300 mg) by mouth 2 times daily 540 tablet 3    ondansetron (ZOFRAN ODT) 4 MG ODT tab Take 1 tablet (4 mg) by mouth every 8 hours as needed for nausea 18 tablet 11    Pediatric Multi Vit-Extra C-FA (FLINTSTONES/EXTRA C) CHEW Take 1 tablet by mouth every morning       rizatriptan (MAXALT-MLT) 10 MG ODT Take 1 tablet (10 mg) by mouth at onset of headache for migraine May repeat in 2 hours. Max 3 tablets/24 hours. 18 tablet 11    sertraline (ZOLOFT) 100 MG tablet Take 100 mg by mouth 2 times daily      SUMAtriptan (IMITREX STATDOSE) 6 MG/0.5ML pen injector kit Inject 0.5 mLs (6 mg) Subcutaneous at onset of headache for migraine (repeat in 2 hours if needed) Max 12 mg/24 hours. (Patient not taking: Reported on 1/23/2024) 9 kit 11    topiramate (TOPAMAX) 100 MG tablet Take 1 tablet (100 mg) by mouth 2 times daily 180 tablet 3    traZODone (DESYREL) 50 MG tablet Take  mg by mouth nightly as needed for sleep (after hydroxyzine)      vitamin D2 (ERGOCALCIFEROL) 25698 units (1250 mcg) capsule Take 1 capsule by mouth once a week (Patient not  taking: Reported on 1/23/2024)      vitamin D3 (CHOLECALCIFEROL) 50 mcg (2000 units) tablet Take 1 tablet by mouth daily          Medication Notes:        AED Medication Compliance:  noncompliant some of the time - He misses at least one dose less than 7 days of the week, but more than 3 days per week. His morning dose is more problematic.  Using a pill box:  No - carries his meds with him. We set alarms on his phone for his medication today while in clinic.      Latest Reference Range & Units Most Recent   Lamotrigine 3.0 - 15.0 ug/mL <0.9 (L)  1/28/23 00:23   Topiramate Level 5.0 - 20.0 ug/mL <1.5 (L)  1/28/23 00:23       Review of Systems:  Lethargy / Tiredness:  Yes (excessive yawning with lamotrigine)  Double Vision:  No  Depression:  No  Poor Balance:  No  Dizziness:  No  Blurred Vision:  No  Have you experienced a traumatic fall since your last visit: NO  Are these falls related to your seizures: Not Applicable    Other Issues:    Is patient safe to drive:  No - [place] state driving laws discussed. States he is not driving    Exam:    /77 (BP Location: Right arm, Patient Position: Sitting, Cuff Size: Adult Large)   Pulse 74   Temp 97  F (36.1  C) (Temporal)      Wt Readings from Last 5 Encounters:   01/23/24 265 lb (120.2 kg)   09/11/23 250 lb (113.4 kg)   03/14/23 262 lb (118.8 kg)   12/13/22 260 lb (117.9 kg)   05/06/22 247 lb (112 kg)     General: General examination reveals a well developed male in no acute distress    Neurological Examination:    Mental Status: Alert and awake. Mood and affect were appropriate to situation. Memory appeared intact, not formally tested. Language without dysarthria.  Cranial Nerves:  II-XII grossly intact. EOMI without nystagmus.  Motor: Moves all four extremities spontaneously and against gravity.  Coordination: Finger-nose-finger testing was normal and without tremor or ataxia. Heel to shin intact bilaterally  Station and Gait: Station was normal based. Gait was  normal with good stride, good arm swing and good turning. Negative Romberg.      Assessment and Plan:   Mr. Brown has history of focal seizures with impairment in awareness, EEG documented psychogenic non epileptic spells, anxiety, depression, PTSD, and history of bariatric surgery. He has ongoing type 1 (documented NEE) and 2 (suspected NEE) spells since last seen. He has ongoing noncompliance with medications.    No changes in his medications were made today. We discussed the importance of taking his medications regularly. We spent time setting alarms on his phone during today's visit to assist with compliance. We reviewed risk for SJS if missing too many doses of his lamotrigine and going back to his full dose abruptly. Information on how to make up missed doses of medication were provided.    He was encouraged to continue working with his mental health team.    He will keep his inpatient video EEG monitoring to evaluate his spells of hand shaking and staring, and follow up with Dr. Snyder as scheduled. If he has questions, concerns, or worsening symptoms in the meantime he was encouraged to contact the clinic.    Thank you for letting me participate in your patient's care.    As described above, I met with the patient for 30 minutes and during this time counseling was greater than 50% of the visit time.        Again, thank you for allowing me to participate in the care of your patient.      Sincerely,    Tanisha Wilson PA-C

## 2024-04-02 ENCOUNTER — HOSPITAL ENCOUNTER (OUTPATIENT)
Age: 39
End: 2024-04-02
Payer: COMMERCIAL

## 2024-04-05 ENCOUNTER — VIRTUAL VISIT (OUTPATIENT)
Dept: NEUROLOGY | Facility: CLINIC | Age: 39
End: 2024-04-05
Payer: COMMERCIAL

## 2024-04-05 DIAGNOSIS — R56.9 SEIZURES (H): ICD-10-CM

## 2024-04-05 DIAGNOSIS — F44.5 PSYCHOGENIC NONEPILEPTIC SEIZURE: ICD-10-CM

## 2024-04-05 DIAGNOSIS — R56.9 SEIZURE-LIKE ACTIVITY (H): Primary | ICD-10-CM

## 2024-04-05 NOTE — PROGRESS NOTES
"Call to patient prior to admission for VEEG monitoring.  Background information from CASSANDRA Wilson's note dated 2/29/2024    \"Assessment and Plan:   Mr. Brown has history of focal seizures with impairment in awareness, EEG documented psychogenic non epileptic spells, anxiety, depression, PTSD, and history of bariatric surgery. He has ongoing type 1 (documented NEE) and 2 (suspected NEE) spells since last seen. He has ongoing noncompliance with medications.     No changes in his medications were made today. We discussed the importance of taking his medications regularly. We spent time setting alarms on his phone during today's visit to assist with compliance. We reviewed risk for SJS if missing too many doses of his lamotrigine and going back to his full dose abruptly. Information on how to make up missed doses of medication were provided.     He was encouraged to continue working with his mental health team.     He will keep his inpatient video EEG monitoring to evaluate his spells of hand shaking and staring, and follow up with Dr. Snyder as scheduled. If he has questions, concerns, or worsening symptoms in the meantime he was encouraged to contact the clinic.\"    Admission Planning for Epilepsy Monitoring at the [facility]    MINCEP provider: Lesly Snyder M.D.   Admission date:  4/9/2024   Reason for admission Event characterization - Differential Diagnosis       Nurse education regarding admission: Reviewed by telephone, patient has recollection from the admission 3 years ago   Imaging files are accessible: Yes   EEG reports are accessible: Yes   VEEG Consent scanned on file No    needs: No   Is this a local patient?  YES   Harness room No   Barriers to discharge:  Private Car    Home environment  independently     Basic introduction to epilepsy was done, including the difference between epileptic and nonepileptic events    We discussed appropriate treatment for each of these and " discussed why an accurate diagnosis is important.    Introduction to [facility] was performed, including hospital policies, how seizures are induced, hygiene breaks, and the importance of staff assistance whenever out of bed, including when in the bathroom.  Javi is aware that the length of stay is generally five to seven days, but that this is dependent on what is captured on EEG.    No other questions at this time.  Patient was instructed to call if questions or concerns arise.

## 2024-04-09 ENCOUNTER — TELEPHONE (OUTPATIENT)
Dept: NEUROLOGY | Facility: CLINIC | Age: 39
End: 2024-04-09

## 2024-04-09 NOTE — TELEPHONE ENCOUNTER
Patient calling in, was suppose to be admitted today. Beds were full, patient got rescheduled for next Friday, patient wants to speak to a nurse he is currently feeling uneasy and not like himself, experiencing fatigue and fogginess. Javi can be reached at [phone].

## 2024-04-11 NOTE — TELEPHONE ENCOUNTER
"Call returned to patient.  He is not feeling bad anymore.  We discussed that he had been looking forward to the admission that was cancelled as he had a spasming episode the day prior, and was feeling quite disappointment the admission was postponed. He feels the spasming was \"electrical not psychosomatic\" and was hoping to get a diagnosis.    We discussed that the scheduled admission for for 4/19/2024 at noon (okay to check in a little earlier).    Patient was instructed to call if questions or concerns arise      "

## 2024-04-19 ENCOUNTER — HOSPITAL ENCOUNTER (INPATIENT)
Facility: CLINIC | Age: 39
LOS: 4 days | Discharge: HOME | End: 2024-04-23
Attending: PSYCHIATRY & NEUROLOGY | Admitting: PSYCHIATRY & NEUROLOGY
Payer: COMMERCIAL

## 2024-04-19 ENCOUNTER — APPOINTMENT (OUTPATIENT)
Dept: NEUROLOGY | Facility: CLINIC | Age: 39
End: 2024-04-19
Attending: PHYSICIAN ASSISTANT
Payer: COMMERCIAL

## 2024-04-19 PROBLEM — R40.4 NONSPECIFIC PAROXYSMAL SPELL: Status: ACTIVE | Noted: 2024-04-19

## 2024-04-19 PROCEDURE — 95714 VEEG EA 12-26 HR UNMNTR: CPT

## 2024-04-19 PROCEDURE — 99207 EEG VIDEO 12-26 HR UNMONITORED: CPT | Performed by: PSYCHIATRY & NEUROLOGY

## 2024-04-19 PROCEDURE — 36415 COLL VENOUS BLD VENIPUNCTURE: CPT | Performed by: PHYSICIAN ASSISTANT

## 2024-04-19 PROCEDURE — 120N000002 HC R&B MED SURG/OB UMMC

## 2024-04-19 PROCEDURE — 250N000013 HC RX MED GY IP 250 OP 250 PS 637: Performed by: PHYSICIAN ASSISTANT

## 2024-04-19 PROCEDURE — 999N000128 HC STATISTIC PERIPHERAL IV START W/O US GUIDANCE

## 2024-04-19 PROCEDURE — 80175 DRUG SCREEN QUAN LAMOTRIGINE: CPT | Performed by: PHYSICIAN ASSISTANT

## 2024-04-19 PROCEDURE — 99223 1ST HOSP IP/OBS HIGH 75: CPT | Performed by: PHYSICIAN ASSISTANT

## 2024-04-19 PROCEDURE — 80201 ASSAY OF TOPIRAMATE: CPT | Performed by: PHYSICIAN ASSISTANT

## 2024-04-19 PROCEDURE — 95720 EEG PHY/QHP EA INCR W/VEEG: CPT | Performed by: PSYCHIATRY & NEUROLOGY

## 2024-04-19 RX ORDER — TRAZODONE HYDROCHLORIDE 50 MG/1
50 TABLET, FILM COATED ORAL
Status: DISCONTINUED | OUTPATIENT
Start: 2024-04-19 | End: 2024-04-23 | Stop reason: HOSPADM

## 2024-04-19 RX ORDER — HYDROXYZINE HYDROCHLORIDE 25 MG/1
25-50 TABLET, FILM COATED ORAL 2 TIMES DAILY PRN
Status: DISCONTINUED | OUTPATIENT
Start: 2024-04-19 | End: 2024-04-23 | Stop reason: HOSPADM

## 2024-04-19 RX ORDER — GINSENG 100 MG
CAPSULE ORAL 3 TIMES DAILY PRN
Status: DISCONTINUED | OUTPATIENT
Start: 2024-04-19 | End: 2024-04-23 | Stop reason: HOSPADM

## 2024-04-19 RX ORDER — ACETAMINOPHEN 500 MG
500 TABLET ORAL EVERY 6 HOURS PRN
Status: DISCONTINUED | OUTPATIENT
Start: 2024-04-19 | End: 2024-04-23 | Stop reason: HOSPADM

## 2024-04-19 RX ORDER — TRAZODONE HYDROCHLORIDE 50 MG/1
50-100 TABLET, FILM COATED ORAL
Status: DISCONTINUED | OUTPATIENT
Start: 2024-04-19 | End: 2024-04-19

## 2024-04-19 RX ORDER — TOPIRAMATE 100 MG/1
100 TABLET, FILM COATED ORAL 2 TIMES DAILY
Status: DISCONTINUED | OUTPATIENT
Start: 2024-04-19 | End: 2024-04-23 | Stop reason: HOSPADM

## 2024-04-19 RX ORDER — RIZATRIPTAN BENZOATE 10 MG/1
10 TABLET, ORALLY DISINTEGRATING ORAL
Status: DISCONTINUED | OUTPATIENT
Start: 2024-04-19 | End: 2024-04-23 | Stop reason: HOSPADM

## 2024-04-19 RX ORDER — LIDOCAINE HYDROCHLORIDE 20 MG/ML
5 SOLUTION OROPHARYNGEAL EVERY 6 HOURS PRN
Status: DISCONTINUED | OUTPATIENT
Start: 2024-04-19 | End: 2024-04-23 | Stop reason: HOSPADM

## 2024-04-19 RX ORDER — ONDANSETRON 4 MG/1
4 TABLET, ORALLY DISINTEGRATING ORAL EVERY 8 HOURS PRN
Status: DISCONTINUED | OUTPATIENT
Start: 2024-04-19 | End: 2024-04-23 | Stop reason: HOSPADM

## 2024-04-19 RX ORDER — LIDOCAINE 40 MG/G
CREAM TOPICAL
Status: DISCONTINUED | OUTPATIENT
Start: 2024-04-19 | End: 2024-04-23 | Stop reason: HOSPADM

## 2024-04-19 RX ORDER — MULTIVIT WITH IRON,MINERALS
1 TABLET,CHEWABLE ORAL DAILY
Status: DISCONTINUED | OUTPATIENT
Start: 2024-04-20 | End: 2024-04-23 | Stop reason: HOSPADM

## 2024-04-19 RX ORDER — DOCUSATE SODIUM 100 MG/1
100 CAPSULE, LIQUID FILLED ORAL 2 TIMES DAILY PRN
Status: DISCONTINUED | OUTPATIENT
Start: 2024-04-19 | End: 2024-04-23 | Stop reason: HOSPADM

## 2024-04-19 RX ORDER — VITAMIN B COMPLEX
50 TABLET ORAL DAILY
Status: DISCONTINUED | OUTPATIENT
Start: 2024-04-20 | End: 2024-04-23 | Stop reason: HOSPADM

## 2024-04-19 RX ORDER — SERTRALINE HYDROCHLORIDE 100 MG/1
100 TABLET, FILM COATED ORAL 2 TIMES DAILY
Status: DISCONTINUED | OUTPATIENT
Start: 2024-04-19 | End: 2024-04-23 | Stop reason: HOSPADM

## 2024-04-19 RX ORDER — LORAZEPAM 2 MG/ML
2 INJECTION INTRAMUSCULAR
Status: DISCONTINUED | OUTPATIENT
Start: 2024-04-19 | End: 2024-04-23 | Stop reason: HOSPADM

## 2024-04-19 RX ORDER — LAMOTRIGINE 100 MG/1
300 TABLET, ORALLY DISINTEGRATING ORAL 2 TIMES DAILY
Status: DISCONTINUED | OUTPATIENT
Start: 2024-04-19 | End: 2024-04-23 | Stop reason: HOSPADM

## 2024-04-19 RX ADMIN — LAMOTRIGINE 300 MG: 100 TABLET, ORALLY DISINTEGRATING ORAL at 15:04

## 2024-04-19 RX ADMIN — SERTRALINE HYDROCHLORIDE 100 MG: 100 TABLET ORAL at 19:45

## 2024-04-19 RX ADMIN — LAMOTRIGINE 300 MG: 100 TABLET, ORALLY DISINTEGRATING ORAL at 19:45

## 2024-04-19 RX ADMIN — TOPIRAMATE 100 MG: 100 TABLET, FILM COATED ORAL at 19:45

## 2024-04-19 ASSESSMENT — ACTIVITIES OF DAILY LIVING (ADL)
ADLS_ACUITY_SCORE: 23
ADLS_ACUITY_SCORE: 23
ADLS_ACUITY_SCORE: 38
ADLS_ACUITY_SCORE: 23
ADLS_ACUITY_SCORE: 37
ADLS_ACUITY_SCORE: 23
ADLS_ACUITY_SCORE: 23
ADLS_ACUITY_SCORE: 37
ADLS_ACUITY_SCORE: 37
ADLS_ACUITY_SCORE: 38
ADLS_ACUITY_SCORE: 23

## 2024-04-19 ASSESSMENT — VISUAL ACUITY: OU: GLASSES

## 2024-04-19 NOTE — H&P
"[facility] Epilepsy Admission     Javi Brown MRN# [id number]   YOB: 1985 Age: 38 year old        Reason for Admission: Patient is a 38 year old right-handed male with a history of focal epilepsy, migraines, depression, anxiety, borderline personality disorder, PTSD and documented psychogenic nonepileptic spells who is being admitted for characterization of a new spell type.     HPI: First seizure was 1/2021. He recalls feeling dizzy and nauseous and next thing he remembers is waking up with people standing over him. Witnesses reported he was shaking. No tongue bite or incontinence. At the time he was taking lamotrigine for his mood. This was started in 5/2020. Despite this and increases in lamotrigine, his seizure-like spells have continued. An EEG at [place] in 1/12/2021 showed epileptogenic discharge over the central region, consistent with a partial seizure disorder. One subclinical seizure arising from the central region was recorded.     He was admitted to [facility] for vEEG monitoring in 4/2021. Multiple events recorded at that time with unresponsiveness and all were found to be PNES. He did also have an event recorded at that time consisting of rhythmic movements of his right wrist/hand. This was also nonepileptic.     Current spell types:  Type 1: New spell type. No loss of awareness. Has right hand shaking sometimes with distorted posturing of right hand/wrist. He can talk. Usually happens in the evenings. Happens multiple times a week.    Type 2: May feel \"off prior\". Events of staring and unresponsiveness. 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 Happening few times a week.     Other seizure types:  Type 3: Generalized tonic-clonic. Maybe last one 11/2022 when not taking his medications      Type 4: Possible focal seizure. Looks to ceiling, eye deviation lip smacking and loss of awareness. Not currently happening.      Triggers: stress, fatigue      Past antiepileptic drugs: " levetiracetam, lamotrigine, topiramate     Risk Factors: No  injury, he reports some delay in motor development but took regular classes in school. No febrile seizures, CNS infections, tumors, strokes, family history of seizures or substance abuse. Does have history of 4 concussions, 2 with loss of consciousness.     Prior Epilepsy Work-up:  1.Video EEG monitoring at [facility] 2021 during 9 days of video EEG monitoring the background showed intermittent slowing consistent with a mild diffuse encephalopathy. In addition, greater slowing was noted over the left hemisphere, particularly in the temporal region, consistent with focal cortical dysfunction in this region. No epileptiform discharges were seen during 9 days of video EEG monitoring. A total of 5 clinical events were captured. Remote emotional conflicts remote emotional conflicts this data provides evidence that the patient's events involving unresponsiveness are not caused by electrographic seizures. Additionally, the patient's other 2 events which involved variable movements of the extremities were not caused by electrographic seizures.     2. Ambulatory EEG at [facility] 2023 showed 2 Days of ambulatory EEG is abnormal due to the presence of intermittent generalized theta slowing, at times this discharges sharply contoured and high voltage.  This was thought to represent a mild encephalopathy and not epileptiform discharges.  No electrographic seizures, epileptiform discharges, or paroxysmal behavioral spells were recorded.     3. Brain MRI at [facility] 23 showed T2 hyperintense appearance of the left hippocampus suggesting mesial temporal sclerosis.Unchanged small focus of right lateral frontal encephalomalacia. No evidence for intracranial hemorrhage, mass, acute infarct or focal mass lesion..     4. EEG at [place] 2021 shows epileptogenic discharge over the central region, consistent with a partial seizure disorder. One electrographic seizure  arising from the central region was   seen without clinical symptoms.     5.EEG at [facility] 3/15/21 was normal awake and sleep video electroencephalogram. No electrographic seizures or epileptiform discharges were recorded.      6. Brain MRI at [place] 1/12/21 was normal      7. Brain MRA at [place] 5/13/20 was normal MR angiography and venography of head      8. EEG at [place] 2/2/21 was normal     Past Medical History:   1.Nonspecific paroxysmal spells  2. Focal epilepsy  3. Documented psychogenic nonepileptic spells 4/2021   4.. Depression  5. Anxiety  6. Borderline personality disorder  7. PTSD  8. History of ADHD  9. History of gastric bypass 1/2016   10. Migraines     Past Medical History:   Diagnosis Date    Gastric bypass status for obesity      Past Surgical History:   Procedure Laterality Date    RECESSION RESECTION (REPAIR STRABISMUS) BILATERAL Bilateral 9/11/2023    Procedure: Bilateral eye muscle correction.;  Surgeon: Keny Howe MD;  Location: [facility] OR         Medications:   Medications Prior to Admission   Medication Sig Dispense Refill Last Dose    acetaminophen 500 MG CAPS Take 500 mg by mouth as needed    4/19/2024    cyanocobalamin (VITAMIN B-12) 1000 MCG SUBL sublingual tablet Place 1,000 mcg under the tongue daily   Past Week    diazepam (DIAZEPAM INTENSOL) 5 MG/ML (HIGH CONC) solution Diazepam: Give 1 ml (5mg): Give 1 ml for seizures greater 5 minutes or more than 2 seizures within an 8 hour period. May repeat once 1 ml (5mg). Monitor breathing, if there any concerns call 911. Do not exceed 10 mg per day. 30 mL 0 Unknown    Fremanezumab-vfrm (AJOVY) 225 MG/1.5ML SOAJ Inject 225 mg Subcutaneous every 30 days 1.5 mL 11 Past Month    hydrOXYzine (VISTARIL) 25 MG capsule Take 25-50 mg by mouth 2 times daily as needed for anxiety Pt states taking as needed as well.   Past Week    lamoTRIgine (LAMICTAL) 100 MG TBDP ODT tab Take 3 tablets (300 mg) by mouth 2 times daily 540 tablet 3 4/19/2024 at  "1230    ondansetron (ZOFRAN ODT) 4 MG ODT tab Take 1 tablet (4 mg) by mouth every 8 hours as needed for nausea 18 tablet 11 More than a month    Pediatric Multi Vit-Extra C-FA (FLINTSTONES/EXTRA C) CHEW Take 1 tablet by mouth every morning    4/19/2024 at am    rizatriptan (MAXALT-MLT) 10 MG ODT Take 1 tablet (10 mg) by mouth at onset of headache for migraine May repeat in 2 hours. Max 3 tablets/24 hours. 18 tablet 11 More than a month    sertraline (ZOLOFT) 100 MG tablet Take 100 mg by mouth 2 times daily   4/19/2024 at am    topiramate (TOPAMAX) 100 MG tablet Take 1 tablet (100 mg) by mouth 2 times daily 180 tablet 3 4/19/2024 at am    traZODone (DESYREL) 50 MG tablet Take  mg by mouth nightly as needed for sleep (after hydroxyzine)   More than a month    vitamin D3 (CHOLECALCIFEROL) 50 mcg (2000 units) tablet Take 1 tablet by mouth daily   4/19/2024 at am       Allergies:   Allergies   Allergen Reactions    Benadryl [Diphenhydramine] Anxiety    Reglan [Metoclopramide]     Droperidol Unknown, Anxiety and Other (See Comments)     No reaction specified         Family History:   No family history on file.    Social History:   Social History     Tobacco Use    Smoking status: Never    Smokeless tobacco: Never   Substance Use Topics    Alcohol use: Never   Grew up in [place], [place]. Had regular classes in school and graduated high school. Went to culinary school in [place]. Is . No kids. Lives in [place]. 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 Drinks 0-1 times a year. No tobacco or drug use. When asked if driving, he states \"I plead the fifth\". We did review [place] state law that since he is having spells with loss of awareness he can not drive.    He reports follows with psychiatry every 3 months and his psychologist (for eating disorder) every 2 weeks. He also follows with a dietician.     Review of Systems: Positive for headaches/migraines, poor sleep, poor memory. 10 point review of systems negative " "except per HPI    Physical Exam/Vitals:  Blood pressure 119/83, pulse 67, resp. rate 16, height 1.778 m (5' 10\"), weight 122.7 kg (270 lb 9.6 oz), SpO2 97%.  General: NAD  Head: NC/AT  Neck: supple  Respiratory: lungs CTA bilaterally  Cardiac: RRR, no murmur  Abdomen: soft, nontender  Extremities: no LE edema  Neuro: Alert and oriented. Speech fluent. Hearing intact to normal conversation. Pupils equal, round, and reactive to light. Dysconjugate gaze. EOM's intact. Strong shoulder shrug. Face symmetric, tongue midline. Strength 5/5 bilaterally. No drift or pronation. Intact FNF. Sensation intact to light touch bilaterally.  DTR's 2+ bilaterally.       Data:   Latest Reference Range & Units Most Recent   Lamotrigine 3.0 - 15.0 ug/mL <0.9 (L)  1/28/23 00:23   Topiramate Level 5.0 - 20.0 ug/mL <1.5 (L)  1/28/23 00:23       Current antiepileptic drugs:  Lamotrigine -300  Topiramate 100-100    Assessment and Plan:  1.Patient is a 38 year old right-handed male with a history of focal epilepsy, migraines, depression, anxiety, borderline personality disorder, PTSD and documented psychogenic nonepileptic spells who is being admitted for characterization of a new spell type.     -admit for vEEG monitoring  -seizure precautions  -ativan PRN seizures per protocol  -SCD's for DVT prophylaxis  -continue PTA lamotrigine and topiramate   -antiepileptic drug levels       Kitty Simon PA-C    Total time: I spent 60 minutes face-to-face with patient and family reviewing history and performing a physical examination. I spent an additional 15 minutes coordinating care, reviewing labs and imaging. I answered all of patients questions and addressed immediate concerns.       "

## 2024-04-19 NOTE — PHARMACY-ADMISSION MEDICATION HISTORY
Pharmacist Admission Medication History    Admission medication history is complete. The information provided in this note is only as accurate as the sources available at the time of the update.    Information Source(s): Edwin/Kitty Dang PA from Epilepsy team completed medication history with times of last doses    Pertinent Information: None    Changes made to PTA medication list:  Added: None  Deleted: None  Changed: None    Allergies reviewed with patient and updates made in EHR: no    Medication History Completed By: Beronica Wallace [facility] 4/19/2024 3:33 PM    PTA Med List   Medication Sig Last Dose    acetaminophen 500 MG CAPS Take 500 mg by mouth as needed  4/19/2024    cyanocobalamin (VITAMIN B-12) 1000 MCG SUBL sublingual tablet Place 1,000 mcg under the tongue daily Past Week    diazepam (DIAZEPAM INTENSOL) 5 MG/ML (HIGH CONC) solution Diazepam: Give 1 ml (5mg): Give 1 ml for seizures greater 5 minutes or more than 2 seizures within an 8 hour period. May repeat once 1 ml (5mg). Monitor breathing, if there any concerns call 911. Do not exceed 10 mg per day. Unknown    Fremanezumab-vfrm (AJOVY) 225 MG/1.5ML SOAJ Inject 225 mg Subcutaneous every 30 days Past Month    hydrOXYzine (VISTARIL) 25 MG capsule Take 25-50 mg by mouth 2 times daily as needed for anxiety Pt states taking as needed as well. Past Week    lamoTRIgine (LAMICTAL) 100 MG TBDP ODT tab Take 3 tablets (300 mg) by mouth 2 times daily 4/18/2024 at pm    ondansetron (ZOFRAN ODT) 4 MG ODT tab Take 1 tablet (4 mg) by mouth every 8 hours as needed for nausea More than a month    Pediatric Multi Vit-Extra C-FA (FLINTSTONES/EXTRA C) CHEW Take 1 tablet by mouth every morning  4/19/2024 at am    rizatriptan (MAXALT-MLT) 10 MG ODT Take 1 tablet (10 mg) by mouth at onset of headache for migraine May repeat in 2 hours. Max 3 tablets/24 hours. More than a month    sertraline (ZOLOFT) 100 MG tablet Take 100 mg by mouth 2 times  daily 4/19/2024 at am    topiramate (TOPAMAX) 100 MG tablet Take 1 tablet (100 mg) by mouth 2 times daily 4/19/2024 at am    traZODone (DESYREL) 50 MG tablet Take  mg by mouth nightly as needed for sleep (after hydroxyzine) More than a month    vitamin D3 (CHOLECALCIFEROL) 50 mcg (2000 units) tablet Take 1 tablet by mouth daily 4/19/2024 at am

## 2024-04-19 NOTE — PLAN OF CARE
Goal Outcome Evaluation:    Status: VEEG admit today   Vitals: VSS  Neuros: Alert and oriented- neuros intact  IV: PIV SL  Labs/Electrolytes: Lamotrigine and topiramate levels in process   Resp/trach: LS clear  Diet: Regular diet  Bowel status: +BS  : Voiding spont  Skin: Intact  Pain: Denied  Activity: Up with SBA/GB

## 2024-04-19 NOTE — PROGRESS NOTES
Arrived from: Home    Belongings/meds: With pt   2 RN Skin Assessment Completed by: Baron CASTORENA RN, Antonia HAYWOOD RN     Non-intact findings documented (yes/no/NA): Non blanchable redness to heels & left ear. EEG leads intact.   Lamotrigine given. IV ordered. Regular diet. Up SBA/GB.

## 2024-04-20 ENCOUNTER — APPOINTMENT (OUTPATIENT)
Dept: NEUROLOGY | Facility: CLINIC | Age: 39
End: 2024-04-20
Attending: PHYSICIAN ASSISTANT
Payer: COMMERCIAL

## 2024-04-20 PROCEDURE — 250N000013 HC RX MED GY IP 250 OP 250 PS 637: Performed by: PHYSICIAN ASSISTANT

## 2024-04-20 PROCEDURE — 95720 EEG PHY/QHP EA INCR W/VEEG: CPT | Performed by: PSYCHIATRY & NEUROLOGY

## 2024-04-20 PROCEDURE — 999N000128 HC STATISTIC PERIPHERAL IV START W/O US GUIDANCE

## 2024-04-20 PROCEDURE — 95714 VEEG EA 12-26 HR UNMNTR: CPT

## 2024-04-20 PROCEDURE — 120N000002 HC R&B MED SURG/OB UMMC

## 2024-04-20 PROCEDURE — 99232 SBSQ HOSP IP/OBS MODERATE 35: CPT | Performed by: PSYCHIATRY & NEUROLOGY

## 2024-04-20 RX ADMIN — TOPIRAMATE 100 MG: 100 TABLET, FILM COATED ORAL at 20:50

## 2024-04-20 RX ADMIN — LAMOTRIGINE 300 MG: 100 TABLET, ORALLY DISINTEGRATING ORAL at 08:22

## 2024-04-20 RX ADMIN — TOPIRAMATE 100 MG: 100 TABLET, FILM COATED ORAL at 08:22

## 2024-04-20 RX ADMIN — Medication 50 MCG: at 08:22

## 2024-04-20 RX ADMIN — Medication 1000 MCG: at 08:22

## 2024-04-20 RX ADMIN — SERTRALINE HYDROCHLORIDE 100 MG: 100 TABLET ORAL at 20:50

## 2024-04-20 RX ADMIN — SERTRALINE HYDROCHLORIDE 100 MG: 100 TABLET ORAL at 08:22

## 2024-04-20 RX ADMIN — LAMOTRIGINE 300 MG: 100 TABLET, ORALLY DISINTEGRATING ORAL at 20:50

## 2024-04-20 RX ADMIN — RIZATRIPTAN BENZOATE 10 MG: 10 TABLET, ORALLY DISINTEGRATING ORAL at 21:43

## 2024-04-20 RX ADMIN — Medication 1 TABLET: at 08:22

## 2024-04-20 ASSESSMENT — ACTIVITIES OF DAILY LIVING (ADL)
ADLS_ACUITY_SCORE: 23

## 2024-04-20 ASSESSMENT — VISUAL ACUITY
OU: GLASSES
OU: GLASSES

## 2024-04-20 NOTE — PROGRESS NOTES
Daily Progress Note:     April 20, 2024    Interval history:  This patient is a 38 year old who was admitted for Video EEG monitoring for characterization of new seizure-like spells.  No spells overnight. No complaints.      Past Medical History:   Diagnosis Date    Gastric bypass status for obesity        Social History     Socioeconomic History    Marital status: Single     Spouse name: Not on file    Number of children: Not on file    Years of education: Not on file    Highest education level: Not on file   Occupational History    Not on file   Tobacco Use    Smoking status: Never    Smokeless tobacco: Never   Substance and Sexual Activity    Alcohol use: Never    Drug use: Never    Sexual activity: Not on file   Other Topics Concern    Parent/sibling w/ CABG, MI or angioplasty before 65F 55M? Not Asked   Social History Narrative    Not on file     Social Determinants of Health     Financial Resource Strain: Low Risk  (9/13/2023)    Received from [facility]    Overall Financial Resource Strain (CARDIA)     Difficulty of Paying Living Expenses: Not very hard   Food Insecurity: No Food Insecurity (9/13/2023)    Received from [facility]    Hunger Vital Sign     Worried About Running Out of Food in the Last Year: Never true     Ran Out of Food in the Last Year: Never true   Transportation Needs: No Transportation Needs (9/13/2023)    Received from [facility]    PRAPARE - Transportation     Lack of Transportation (Medical): No     Lack of Transportation (Non-Medical): No   Physical Activity: Insufficiently Active (9/13/2023)    Received from [facility]    Exercise Vital Sign     Days of Exercise per Week: 2 days     Minutes of Exercise per Session: 10 min   Stress: No Stress Concern Present (6/30/2022)    Received from [facility]    [employer] - Occupational Stress Questionnaire     Feeling of Stress : Only a little    Social Connections: Moderately Integrated (6/30/2022)    [facility]    Social Connection and Isolation Panel [NHANES]     Frequency of Communication with Friends and Family: Three times a week     Frequency of Social Gatherings with Friends and Family: Once a week     Attends [employer] Services: 1 to 4 times per year     Active Member of Clubs or Organizations: No     Attends Club or Organization Meetings: Not on file     Marital Status: Living with partner   Interpersonal Safety: Unknown (6/30/2022)    Received from [facility]    Humiliation, Afraid, Rape, and Kick questionnaire     Fear of Current or Ex-Partner: Not on file     Emotionally Abused: No     Physically Abused: No     Sexually Abused: No   Housing Stability: Low Risk  (9/13/2023)    [facility] from [facility]    Housing Stability     What is your living situation today?: I have a steady place to live       Current Facility-Administered Medications   Medication Dose Route Frequency Provider Last Rate Last Admin    acetaminophen (TYLENOL) tablet 500 mg  500 mg Oral Q6H PRN Kitty Simon PA        bacitracin ointment   Topical TID PRN Kitty Simon PA        childrens multivitamin w/iron (FLINTSTONES COMPLETE) chewable tablet 1 tablet  1 tablet Oral Daily Kitty Simon PA   1 tablet at 04/20/24 0822    cyanocobalamin (VITAMIN B-12) sublingual tablet 1,000 mcg  1,000 mcg Sublingual Daily Kitty Simon PA   1,000 mcg at 04/20/24 0822    docusate sodium (COLACE) capsule 100 mg  100 mg Oral BID PRN Kitty Simon PA        hydrOXYzine HCl (ATARAX) tablet 25-50 mg  25-50 mg Oral BID PRN Kitty iSmon PA        lamoTRIgine (LaMICtal) ODT tab 300 mg  300 mg Oral BID Kitty Simon PA   300 mg at 04/20/24 0822    lidocaine (LMX4) cream   Topical Q1H PRN Kitty Simon PA        lidocaine (viscous)  "(XYLOCAINE) 2 % solution 5 mL  5 mL Mouth/Throat Q6H PRN Kitty Simon PA        lidocaine 1 % 0.1-1 mL  0.1-1 mL Other Q1H PRN Kitty Simon PA        LORazepam (ATIVAN) injection 2 mg  2 mg Intravenous Q2H PRN Kitty Simon PA        magnesium hydroxide (MILK OF MAGNESIA) suspension 30 mL  30 mL Oral Daily PRN Kitty Simon PA        ondansetron (ZOFRAN ODT) ODT tab 4 mg  4 mg Oral Q8H PRN Kitty Simon PA        rizatriptan (MAXALT-MLT) ODT tab 10 mg  10 mg Oral at onset of headache Kitty Simon PA        sertraline (ZOLOFT) tablet 100 mg  100 mg Oral BID Kitty Simon PA   100 mg at 04/20/24 0822    sodium chloride (PF) 0.9% PF flush 3 mL  3 mL Intracatheter Q8H Kitty Simon PA   3 mL at 04/20/24 1626    sodium chloride (PF) 0.9% PF flush 3 mL  3 mL Intracatheter q1 min prn Kitty Simon PA        topiramate (TOPAMAX) tablet 100 mg  100 mg Oral BID Kitty Simon PA   100 mg at 04/20/24 0822    traZODone (DESYREL) tablet 50 mg  50 mg Oral At Bedtime PRN Kitty Simon PA        Vitamin D3 (CHOLECALCIFEROL) tablet 50 mcg  50 mcg Oral Daily Kitty Simon PA   50 mcg at 04/20/24 0822       ROS:  A 10 point ROS is negative    Physical Exam: Blood pressure 120/79, pulse 62, temperature 97.8  F (36.6  C), temperature source Oral, resp. rate 16, height 1.778 m (5' 10\"), weight 122.7 kg (270 lb 9.6 oz), SpO2 100%.    General exam: General Appearance: No acute distress. HEENT: Normocephalic, atraumatic.  Extremities: No edema, no clubbing, no cyanosis.   Neurologic Exam: Alert and oriented x 3. Speech fluent, appropriate.  Cranial Nerves: Extraocular movement intact. No facial weakness or asymmetry. Hearing normal. Motor Exam: Moving all extremities. Coordination: no ataxia.     VEEG monitoring:  No seizures.    Impression:  This patient is a 38 year old " who was admitted for Video EEG monitoring for characterization of new seizure-like spells.  No spells overnight. No complaints.      Plan:  Continue Lamictal 300 mg bid and Topamax 100 mg bid  Continue VEEG monitoring.    Type of target event identified: Arm shaking spells  Number of events: More needed  Discharge medication plan: To be decided  Further Imaging studies needed prior to discharge: No imaging required prior to discharge.   Discharge transportation: Not discussed  Other pertinent issues/goals for discharge: Not at this time        Rachel Marques MD

## 2024-04-20 NOTE — PLAN OF CARE
Goal Outcome Evaluation:      Plan of Care Reviewed With: patient    Overall Patient Progress: no changeOverall Patient Progress: no change    Outcome Evaluation: No events this shift    Status: Admitted for vEEG monitoring for characterization of a new spell type. Hx of focal epilepsy, migraines, depression, anxiety, BPD, PTSD, and psychogenic nonepileptic spells.   Vitals: VSS   Neuros: Intact.   IV: PIV SL   Resp/trach: WNL on RA   Diet: Regular   Bowel status: BM this shift   : Voiding spontaneously   Skin: Non-blanchable redness to heels and L ear. EEG leads intact.   Pain: Denied.   Activity: SBA/GB  Plan: Continue with current POC.   Updates this shift: No events witnessed/reported this shift.

## 2024-04-20 NOTE — PLAN OF CARE
Status: Admitted for vEEG monitoring for characterization of a new spell type. Hx of focal epilepsy, migraines, depression, anxiety, BPD, PTSD, and psychogenic nonepileptic spells.   Vitals: VSS on RA.   Neuros: Intact.   IV: PIV SL   Resp/trach: WNL  Diet: Regular   Bowel status: LBM PTA, BS+  : Voiding spontaneously   Skin: Non-blanchable redness to heels and L ear. EEG leads intact.   Pain: Denied.   Activity: SBA, GB  Plan: Continue with current POC.   Updates this shift: No events witnessed/reported this shift.

## 2024-04-21 ENCOUNTER — APPOINTMENT (OUTPATIENT)
Dept: NEUROLOGY | Facility: CLINIC | Age: 39
End: 2024-04-21
Attending: PHYSICIAN ASSISTANT
Payer: COMMERCIAL

## 2024-04-21 LAB
LAMOTRIGINE SERPL-MCNC: 9.3 UG/ML
TOPIRAMATE SERPL-MCNC: 5 UG/ML

## 2024-04-21 PROCEDURE — 95720 EEG PHY/QHP EA INCR W/VEEG: CPT | Mod: GC | Performed by: PSYCHIATRY & NEUROLOGY

## 2024-04-21 PROCEDURE — 95714 VEEG EA 12-26 HR UNMNTR: CPT

## 2024-04-21 PROCEDURE — 120N000002 HC R&B MED SURG/OB UMMC

## 2024-04-21 PROCEDURE — 250N000013 HC RX MED GY IP 250 OP 250 PS 637: Performed by: PHYSICIAN ASSISTANT

## 2024-04-21 RX ADMIN — Medication 1000 MCG: at 08:51

## 2024-04-21 RX ADMIN — Medication 50 MCG: at 08:51

## 2024-04-21 RX ADMIN — LAMOTRIGINE 300 MG: 100 TABLET, ORALLY DISINTEGRATING ORAL at 10:07

## 2024-04-21 RX ADMIN — LAMOTRIGINE 300 MG: 100 TABLET, ORALLY DISINTEGRATING ORAL at 20:32

## 2024-04-21 RX ADMIN — Medication 1 TABLET: at 08:51

## 2024-04-21 RX ADMIN — SERTRALINE HYDROCHLORIDE 100 MG: 100 TABLET ORAL at 08:51

## 2024-04-21 RX ADMIN — TOPIRAMATE 100 MG: 100 TABLET, FILM COATED ORAL at 20:32

## 2024-04-21 RX ADMIN — SERTRALINE HYDROCHLORIDE 100 MG: 100 TABLET ORAL at 20:32

## 2024-04-21 RX ADMIN — TOPIRAMATE 100 MG: 100 TABLET, FILM COATED ORAL at 08:52

## 2024-04-21 ASSESSMENT — ACTIVITIES OF DAILY LIVING (ADL)
ADLS_ACUITY_SCORE: 23

## 2024-04-21 NOTE — PROGRESS NOTES
Time/length of seizure event:1321 5-6 mins   Movements (head, eyes, extremities): Staring, slow to respond, minimal movement  Orientation during seizure: Oriented  After seizure, remembers the unique phrase given during seizure: Yes  After seizure, remembers an unnamed visual object shown during seizure: No  Able to identify/name aloud item during seizure: Yes  Able to follow command during seizure: Yes  Able to read test sentence aloud during seizure: Yes  Able to read test sentence aloud again after the seizure: Yes  After seizure, remembers name of object shown during seizure: Yes  Orientation and level of consciousness after seizure:  VS and oxygen saturation during/after seizure:  Recall of the event?:  Yes   Was this a typical seizure/event?: Yes  Presence of aura or pre-seizure activity:  Incontinence: No    Epilepsy paged.

## 2024-04-21 NOTE — PLAN OF CARE
"Status: Admitted for vEEG monitoring for characterization of a new spell type. Hx of focal epilepsy, migraines, depression, anxiety, BPD, PTSD, and psychogenic nonepileptic spells.   Vitals: VSS on RA. Cont. Pulse ox on overnight  Neuros: Intact  IV: L PIV SL  Resp/trach: WNL  Diet: Regular  Bowel status: LBM 4/20  : voiding spontaneously  Skin: Non-blanchable redness to heels and L ear. EEG leads intac   Pain: PRN rizatriptan for migraine  Activity: SBA, GB  Plan: Still on PTA meds. Cont. To monitor and follow POC  Updates this shift: no events witnessed or reported    Current spell types:  Type 1: New spell type. No loss of awareness. Has right hand shaking sometimes with distorted posturing of right hand/wrist. He can talk. Usually happens in the evenings. Happens multiple times a week.  Type 2: May feel \"off prior\". Events of staring and unresponsiveness. 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 Happening few times a week.   Other seizure types:  Type 3: Generalized tonic-clonic. Maybe last one 11/2022 when not taking his medications    Type 4: Possible focal seizure. Looks to ceiling, eye deviation lip smacking and loss of awareness. Not currently happening.   "

## 2024-04-21 NOTE — PLAN OF CARE
Goal Outcome Evaluation:      Plan of Care Reviewed With: patient    Overall Patient Progress: no changeOverall Patient Progress: no change    Outcome Evaluation: 1 Event this shift.    Status: Admitted for vEEG monitoring for characterization of a new spell type. Hx of focal epilepsy, migraines, depression, anxiety, BPD, PTSD, and psychogenic nonepileptic spells.   Vitals: VSS   Neuros: Intact.   IV: PIV SL   Resp/trach: WNL on RA   Diet: Regular   Bowel status: BM this shift   : Voiding spontaneously   Skin: Non-blanchable redness to heels and L ear. EEG leads intact.   Pain: Denied.   Activity: SBA/GB. Up in chair x1.   Plan: Continue with current POC.   Updates this shift: 1 Event this shift. Epilepsy paged.

## 2024-04-22 ENCOUNTER — APPOINTMENT (OUTPATIENT)
Dept: NEUROLOGY | Facility: CLINIC | Age: 39
End: 2024-04-22
Attending: PHYSICIAN ASSISTANT
Payer: COMMERCIAL

## 2024-04-22 PROCEDURE — 250N000013 HC RX MED GY IP 250 OP 250 PS 637: Performed by: PHYSICIAN ASSISTANT

## 2024-04-22 PROCEDURE — 95720 EEG PHY/QHP EA INCR W/VEEG: CPT | Mod: GC | Performed by: PSYCHIATRY & NEUROLOGY

## 2024-04-22 PROCEDURE — 120N000002 HC R&B MED SURG/OB UMMC

## 2024-04-22 PROCEDURE — 99232 SBSQ HOSP IP/OBS MODERATE 35: CPT | Performed by: PHYSICIAN ASSISTANT

## 2024-04-22 PROCEDURE — 95714 VEEG EA 12-26 HR UNMNTR: CPT

## 2024-04-22 RX ADMIN — TOPIRAMATE 100 MG: 100 TABLET, FILM COATED ORAL at 19:53

## 2024-04-22 RX ADMIN — LAMOTRIGINE 300 MG: 100 TABLET, ORALLY DISINTEGRATING ORAL at 08:17

## 2024-04-22 RX ADMIN — SERTRALINE HYDROCHLORIDE 100 MG: 100 TABLET ORAL at 19:53

## 2024-04-22 RX ADMIN — Medication 50 MCG: at 08:17

## 2024-04-22 RX ADMIN — LAMOTRIGINE 300 MG: 100 TABLET, ORALLY DISINTEGRATING ORAL at 19:54

## 2024-04-22 RX ADMIN — Medication 1000 MCG: at 08:16

## 2024-04-22 RX ADMIN — TOPIRAMATE 100 MG: 100 TABLET, FILM COATED ORAL at 08:17

## 2024-04-22 RX ADMIN — SERTRALINE HYDROCHLORIDE 100 MG: 100 TABLET ORAL at 08:17

## 2024-04-22 RX ADMIN — Medication 1 TABLET: at 08:17

## 2024-04-22 ASSESSMENT — ACTIVITIES OF DAILY LIVING (ADL)
ADLS_ACUITY_SCORE: 23

## 2024-04-22 NOTE — PLAN OF CARE
"Status: Admitted for seizure monitoring, no events witnessed or reported this shift (felt \"off\" after walk today, PA at bedside during this)  Vitals: VSS on RA  Neuros: Intact   IV: PIV SL  Resp/trach: WNL  Diet: Regular   Bowel status: BS+, LBM 4/21  : Voiding   Skin: EEG leads intact   Pain: Denied   Activity: SBA and GB  Plan: Sleep deprive tonight, to stay up until 0400. No medication changes. Probable discharge Thursday. Continue with POC     Current spell types:  Type 1: New spell type. No loss of awareness. Has right hand shaking sometimes with distorted posturing of right hand/wrist. He can talk. Usually happens in the evenings. Happens multiple times a week.  Type 2: May feel \"off prior\". Events of staring and unresponsiveness. 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 Happening few times a week.   Other seizure types:  Type 3: Generalized tonic-clonic. Maybe last one 11/2022 when not taking his medications    Type 4: Possible focal seizure. Looks to ceiling, eye deviation lip smacking and loss of awareness. Not currently happening.   "

## 2024-04-22 NOTE — PLAN OF CARE
Status: Admitted for vEEG monitoring for characterization of a new spell type. Hx of focal epilepsy, migraines, depression, anxiety, BPD, PTSD, and psychogenic nonepileptic spells.   Vitals: VSS on RA. Cont. Pulse ox on overnight  Neuros: Intact  IV: L PIV SL  Resp/trach: WNL  Diet: Regular  Bowel status: LBM 4/21  : voiding spontaneously  Skin: EEG leads intact  Pain: Denies  Activity: SBA, GB  Plan: Continue to monitor.   Updates this shift: no events witnessed or reported

## 2024-04-22 NOTE — PROGRESS NOTES
"[facility], [place]   Epilepsy Service Daily Note      Interval History:   No spells with right hand shaking thus far. Tolerating admission well.     Review of System:   No nausea, No vomiting, no headaches, no dizziness, no chest pain.     Medications:   Antiepileptic Medications Home Doses: lamotrigine -300, topiramate 100-100  Antiepileptic Medications Current Doses: lamotrigine -300, topiramate 100-100    Exam: Blood pressure 131/84, pulse 60, temperature 98  F (36.7  C), temperature source Oral, resp. rate 16, height 1.778 m (5' 10\"), weight 122.7 kg (270 lb 9.6 oz), SpO2 96%.  General: NAD  Head: NC/AT  Extremities: No LE edema  Neuro:  Alert and oriented. Speech fluent. Hearing intact to normal conversation. Pupils equal, round, and reactive to light. Dysconjugate gaze. EOM's intact. Strong shoulder shrug. Face symmetric, tongue midline. Strength 5/5 bilaterally. No drift or pronation. Intact FNF. Sensation intact to light touch bilaterally.   EEG: mildly abnormal EEG due to the presence of intermittent brief bursts of generalized theta slowing during waking. It is not entirely clear if the theta slowing are notched at this time.   Assessment and Plan: patient seen and discussed with Dr. Claudio Elizabeth.Patient is a 38 year old right-handed male with a history of focal epilepsy, migraines, depression, anxiety, borderline personality disorder, PTSD and documented psychogenic nonepileptic spells who is being admitted for characterization of a new spell type. Target spell not recorded yet.       -continue vEEG monitoring  -sleep deprive tonight  -plan for discharge Thursday       Kitty Simon PA-C    Type of target event identified: event with no loss of awareness and staring spell with altered awareness   Number of events: more needed  Discharge medication plan: To be decided  Further Imaging studies needed prior to discharge: No imaging required prior to " discharge  Discharge transportation: not discussed  Other pertinent issues/goals for discharge: no      Total time: 35 minute was spent in the care of this patient. The patient agrees with the above mentioned plan of care. I answered all the patient's questions and addressed immediate concerns. More than 50% of time spent consisted of counseling and coordinating care, including discussion of the diagnostic significance of EEG findings, anti-seizure medication management, and planning for discharge home

## 2024-04-23 ENCOUNTER — APPOINTMENT (OUTPATIENT)
Dept: NEUROLOGY | Facility: CLINIC | Age: 39
End: 2024-04-23
Attending: PHYSICIAN ASSISTANT
Payer: COMMERCIAL

## 2024-04-23 VITALS
RESPIRATION RATE: 18 BRPM | OXYGEN SATURATION: 98 % | HEIGHT: 70 IN | BODY MASS INDEX: 38.74 KG/M2 | DIASTOLIC BLOOD PRESSURE: 81 MMHG | WEIGHT: 270.6 LBS | TEMPERATURE: 97.7 F | SYSTOLIC BLOOD PRESSURE: 124 MMHG | HEART RATE: 57 BPM

## 2024-04-23 PROCEDURE — 250N000013 HC RX MED GY IP 250 OP 250 PS 637: Performed by: PHYSICIAN ASSISTANT

## 2024-04-23 PROCEDURE — 95711 VEEG 2-12 HR UNMONITORED: CPT

## 2024-04-23 PROCEDURE — 99239 HOSP IP/OBS DSCHRG MGMT >30: CPT | Performed by: PHYSICIAN ASSISTANT

## 2024-04-23 PROCEDURE — 95718 EEG PHYS/QHP 2-12 HR W/VEEG: CPT | Mod: GC | Performed by: PSYCHIATRY & NEUROLOGY

## 2024-04-23 RX ADMIN — TOPIRAMATE 100 MG: 100 TABLET, FILM COATED ORAL at 09:20

## 2024-04-23 RX ADMIN — Medication 1000 MCG: at 09:20

## 2024-04-23 RX ADMIN — Medication 1 TABLET: at 09:20

## 2024-04-23 RX ADMIN — Medication 50 MCG: at 09:20

## 2024-04-23 RX ADMIN — LAMOTRIGINE 300 MG: 100 TABLET, ORALLY DISINTEGRATING ORAL at 09:20

## 2024-04-23 RX ADMIN — SERTRALINE HYDROCHLORIDE 100 MG: 100 TABLET ORAL at 09:20

## 2024-04-23 ASSESSMENT — ACTIVITIES OF DAILY LIVING (ADL)
ADLS_ACUITY_SCORE: 23

## 2024-04-23 NOTE — PLAN OF CARE
Discharge time/date: 4/23 1605  Walked or Wheelchair: Wheelchair   PIV removed: Yes  Reviewed AVS with patient: Yes  Medication due times added to AVS in EPIC: Yes  Verbalized understanding of discharge with teachback: Yes  Medications retrieved from pharmacy: No, no new medications ordered   Supplies sent home: No  Belongings from security with patient: NA

## 2024-04-23 NOTE — PLAN OF CARE
Time/length of seizure event:3-4min  Movements (head, eyes, extremities):right arm jerking hand squeezing,  Orientation during seizure: N/A  After seizure, remembers the unique phrase given during seizure:NO  After seizure, remembers an unnamed visual object shown during seizure:YES  Able to identify/name aloud item during seizure:NO  Able to follow command during seizure:NO  Able to read test sentence aloud during seizure:NO  Able to read test sentence aloud again after the seizure:YES  After seizure, remembers name of object shown during seizure:YES  Orientation and level of consciousness after seizure:YES, a&ox4   VS and oxygen saturation during/after seizure:123/76 98% 55  Recall of the event?:no  Was this a typical seizure/event?:yes  Presence of aura or pre-seizure activity:foggy before event   Incontinence:no

## 2024-04-23 NOTE — PLAN OF CARE
Status: Admitted for seizure monitoring for characterization of a new spell type. Hx of focal epilepsy, migraines, depression, anxiety, BPD, PTSD, and psychogenic nonepileptic spells.   Vitals: VSS on RA  Neuros: intact  IV: PIV SL  Resp/trach:  WNL, Pulse ox on OVN  Diet: Regular   Bowel status: BS+ LBM 4/21  : Voiding  Skin: EEG leads intact  Pain: Denies  Activity: SBA GB  Plan/updates: Stayed up till 0400, asleep 2 hrs awakes again till 10pm tonight. 1 event overnight MD paged. Possible discharge Thursday .

## 2024-04-23 NOTE — PROVIDER NOTIFICATION
Javi Brown 0057- FYI pt had event Right arm jerking and hand squeezing typical type 1 event  VSS, [place] in the 50's

## 2024-04-23 NOTE — DISCHARGE SUMMARY
[facility]  EPILEPSY SERVICE DISCHARGE SUMMARY    Patient Name:  Javi Brown  MRN:  [id number]      :  1985      Date of Admission:  2024  Date of Discharge::  2024  4:01 PM  Admitting Physician:  Lesly Snyder MD  Discharge Physician:  CASSANDRA Rodriguez, Dr. Snyder   Primary Care Provider:   Lauren Ambriz  Discharge Disposition:   Discharged to home    Admission Diagnoses:  1.Nonspecific paroxysmal spells  2. Focal epilepsy  3. Documented psychogenic nonepileptic spells 2021   4.. Depression  5. Anxiety  6. Borderline personality disorder  7. PTSD  8. History of ADHD  9. History of gastric bypass 2016   10. Migraines    Discharge Diagnoses:    1.Psychogenic nonepileptic spells   2. Focal epilepsy  3.Depression  4. Anxiety  5. Borderline personality disorder  6. PTSD  7. History of ADHD  8. History of gastric bypass 2016   9. Migraines     Brief History of Illness:   Patient is a 38 year old right-handed male with a history of focal epilepsy, migraines, depression, anxiety, borderline personality disorder, PTSD and documented psychogenic nonepileptic spells who is being admitted for characterization of a new spell type.  First seizure was 2021. He recalls feeling dizzy and nauseous and next thing he remembers is waking up with people standing over him. Witnesses reported he was shaking. No tongue bite or incontinence. At the time he was taking lamotrigine for his mood. This was started in 2020. Despite this and increases in lamotrigine, his seizure-like spells have continued. An EEG at [facility] in 2021 showed epileptogenic discharge over the central region, consistent with a partial seizure disorder. One subclinical seizure arising from the central region was recorded.      He was admitted to [facility] for vEEG monitoring in 2021. Multiple events recorded at that time with unresponsiveness and all were found to be PNES. He did also  "have an event recorded at that time consisting of rhythmic movements of his right wrist/hand. This was also nonepileptic.      Current spell types:  Type 1: New spell type. No loss of awareness. Has right hand shaking sometimes with distorted posturing of right hand/wrist. He can talk. Usually happens in the evenings. Happens multiple times a week.  Type 2: May feel \"off prior\". Events of staring and unresponsiveness. 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 Happening few times a week.   Other seizure types:  Type 3: Generalized tonic-clonic. Maybe last one 11/2022 when not taking his medications    Type 4: Possible focal seizure. Looks to ceiling, eye deviation lip smacking and loss of awareness. Not currently happening.     Hospital course:  One target type 1 spell was recorded without EEG correlate to show electrographic seizure. Spell was consistent with psychogenic nonepileptic spell. This is not a new diagnosis for patient and he is following with a therapist (one who specializes in eating disorders) and a psychiatrist. We did discuss consideration to additionally follow with a therapist who doesn't only specialize in eating disorders. He is accepting of diagnosis. No changes were made to his PTA lamotrigine and topiramate.     Attending Physician (Dr. Snyder) Summary and Plan:  \"Javi Brown has an extensive history of epilepsy, psychiatric history, and history of psychogenic nonepileptic spell.  Recently he has experienced increased episodes of right arm stiffening with shaking and loss of awareness.  These specific spells were not defined and characterized on video EEG.  On this admission our focus was to define the spells on video EEG.  We did not lower his antiseizure medications on this admission.  During Video EEG, the patient experienced target event of right hand shaking with unresponsiveness. Electrographically, there were no changes on the EEG to suggest that these are seizures. " "The EEG showed a maintained parieto-occipital rhythm during the events. These are the specific spells that the patient and their family have identified as concerning.  He was advised to continue same doses of antiseizure medications at home, continue working with therapist on a weekly basis, and he was advised not to drive.     SUMMARY OF THE 5 DAYS OF VEEG: The 5 days of VEEG is abnormal due presence of a generalized, intermittent theta slowing consistent with a mild, diffuse encephalopathy. There were 2 events in this recording. First event occurred on day 3. Patient reported a \"weird feeling in stomach\", slumping left then right, mouth movements and reporting tingling of feet/tips of fingers. Patient was not responding appropriately to baseline testing after the event. Underlying VEEG did not show seizures. The second event occurred on day 4 which consisted of right hand rhythmic shaking with unresponsiveness that had no EEG correlate to suggest seizure activity (target spell).  These events were psychogenic non-epileptic events. There were no epileptiform discharges or electrographic seizures in the recording.        Following the Video EEG evaluation, the patient and family were informed of the following:     These spells are not epileptic seizures; they are most likely associated with unresolved emotional conflicts  The patient does not require increase in antiepileptic drugs to manage these spells; instead, they are best addressed through comprehensive psychiatric care involving a psychologist and psychiatrist.  The patient demonstrated good insight and was able to understand that these spells are not seizures. They expressed acceptance of this diagnosis and relief that the spells are not epileptic in nature. They were agreeable to follow-up care with mental health providers.  Strategies were discussed to manage the spells, including focusing on breathing and simple hand movements, to potentially reduce the " "intensity of the episodes. The patient and family agreed to try these techniques.  It was emphasized that when a spell occurs, the patient should concentrate on deep breathing and gently opening and closing their fist.  Behavioral health treatment was recommended, and the patient agreed to follow up with a mental health care therapist and psychiatrist.  The patient was provided with follow-up call from the [facility] provider was scheduled for four weeks.  During the debriefing with the patient, it was reiterated that these spells are non-epileptic (psychogenic seizures or pseudoseizures) and not true seizures. It was explained that these spells do not cause brain damage, and the EEG results are normal. The importance of engaging a dedicated mental health care provider for treatment was emphasized. The patient does have a history of PTSD, and addressing these issues with a mental health care provider was recommended.\"    Consultations:    None     Discharge Medications:    Current Discharge Medication List        CONTINUE these medications which have NOT CHANGED    Details   acetaminophen 500 MG CAPS Take 500 mg by mouth as needed       cyanocobalamin (VITAMIN B-12) 1000 MCG SUBL sublingual tablet Place 1,000 mcg under the tongue daily      diazepam (DIAZEPAM INTENSOL) 5 MG/ML (HIGH CONC) solution Diazepam: Give 1 ml (5mg): Give 1 ml for seizures greater 5 minutes or more than 2 seizures within an 8 hour period. May repeat once 1 ml (5mg). Monitor breathing, if there any concerns call 911. Do not exceed 10 mg per day.  Qty: 30 mL, Refills: 0    Associated Diagnoses: Seizure (H)      Fremanezumab-vfrm (AJOVY) 225 MG/1.5ML SOAJ Inject 225 mg Subcutaneous every 30 days  Qty: 1.5 mL, Refills: 11    Associated Diagnoses: Chronic migraine without aura without status migrainosus, not intractable      hydrOXYzine (VISTARIL) 25 MG capsule Take 25-50 mg by mouth 2 times daily as needed for anxiety Pt states taking as needed as " well.      lamoTRIgine (LAMICTAL) 100 MG TBDP ODT tab Take 3 tablets (300 mg) by mouth 2 times daily  Qty: 540 tablet, Refills: 3    Associated Diagnoses: Seizure (H)      ondansetron (ZOFRAN ODT) 4 MG ODT tab Take 1 tablet (4 mg) by mouth every 8 hours as needed for nausea  Qty: 18 tablet, Refills: 11    Associated Diagnoses: Chronic migraine without aura without status migrainosus, not intractable      Pediatric Multi Vit-Extra C-FA (FLINTSTONES/EXTRA C) CHEW Take 1 tablet by mouth every morning       rizatriptan (MAXALT-MLT) 10 MG ODT Take 1 tablet (10 mg) by mouth at onset of headache for migraine May repeat in 2 hours. Max 3 tablets/24 hours.  Qty: 18 tablet, Refills: 11    Associated Diagnoses: Chronic migraine without aura without status migrainosus, not intractable      sertraline (ZOLOFT) 100 MG tablet Take 100 mg by mouth 2 times daily      topiramate (TOPAMAX) 100 MG tablet Take 1 tablet (100 mg) by mouth 2 times daily  Qty: 180 tablet, Refills: 3    Associated Diagnoses: Chronic migraine without aura without status migrainosus, not intractable      traZODone (DESYREL) 50 MG tablet Take  mg by mouth nightly as needed for sleep (after hydroxyzine)      vitamin D3 (CHOLECALCIFEROL) 50 mcg (2000 units) tablet Take 1 tablet by mouth daily             Discharge physical examination:   Vitals:  B/P: 124/81, T: 97.7, P: 57, R: 18  General:  Adult, in NAD, cooperative  HEENT:  NC/AC  Cardiac:  RRR, no m/r/g  Chest:  CTAB  Abdomen:  S/NT/ND  Extremities:  No LE swelling.    Skin:  No rash or lesion.    Psych:  Mood pleasant, affect congruent  Neuro: Alert and oriented. Speech fluent. Hearing intact to normal conversation. Pupils equal, round, and reactive to light. Dysconjugate gaze. EOM's intact. Strong shoulder shrug. Face symmetric, tongue midline. Strength 5/5 bilaterally. No drift or pronation. Intact FNF. Sensation intact to light touch bilaterally.      Discharge follow up and instructions:    1.   Diet:   as prior to admission   2.  Activity: State laws prohibit operating a motor vehicle following any seizure or other episode with loss of awareness, or inability to sit up (Varies by state, be aware and comply with the regulations applicable to you). State law may require the licensed  to report any future episode to the DMV . Avoid any activities that might lead to self-injury or injury of others following any event with impaired awareness or impaired motor control. Such activities include but are not limited to holding babies or young children at heights from which they might be injured if dropped, bathing infants or young children in situations in which they might drown without continuous interactive care by an adult who is fully capable at all times during the bath, operating power cutting or other tools, handling firearms, exposure to heights from which you might fall, exposure to vessels with hot cooking oil or water, and swimming alone.  3.  Follow up:  [facility] nurse call in 2 days. Follow-up discharge video visit with JERMAINE in 7-10 days and follow-up with Dr. Snyder as scheduled in 5/2024.     CASSANDRA Rodriguez    Total time: 35 minutes was spent in the care of this patient. The patient agrees with the above mentioned plan of care. I answered all the patient's questions and addressed immediate concerns. More than 50% of time spent consisted of counseling and coordinating care, including discussion of the diagnostic significance of EEG findings, anti-seizure medication management, and planning for discharge home

## 2024-04-23 NOTE — PROGRESS NOTES
"ATTENDING NOTE:     Javi Brown has an extensive history of epilepsy, psychiatric history, and history of psychogenic nonepileptic spell.  Recently he has experienced increased episodes of right arm stiffening with shaking and loss of awareness.  These specific spells were not defined and characterized on video EEG.  On this admission our focus was to define the spells on video EEG.  We did not lower his antiseizure medications on this admission.  During Video EEG, the patient experienced target event of right hand shaking with unresponsiveness. Electrographically, there were no changes on the EEG to suggest that these are seizures. The EEG showed a maintained parieto-occipital rhythm during the events. These are the specific spells that the patient and their family have identified as concerning.  He was advised to continue same doses of antiseizure medications at home, continue working with therapist on a weekly basis, and he was advised not to drive.    SUMMARY OF THE 5 DAYS OF VEEG: The 5 days of VEEG is abnormal due presence of a generalized, intermittent theta slowing consistent with a mild, diffuse encephalopathy. There were 2 events in this recording. First event occurred on day 3. Patient reported a \"weird feeling in stomach\", slumping left then right, mouth movements and reporting tingling of feet/tips of fingers. Patient was not responding appropriately to baseline testing after the event. Underlying VEEG did not show seizures. The second event occurred on day 4 which consisted of right hand rhythmic shaking with unresponsiveness that had no EEG correlate to suggest seizure activity (target spell).  These events were psychogenic non-epileptic events. There were no epileptiform discharges or electrographic seizures in the recording.      Following the Video EEG evaluation, the patient and family were informed of the following:    These spells are not epileptic seizures; they are most likely " associated with unresolved emotional conflicts  The patient does not require increase in antiepileptic drugs to manage these spells; instead, they are best addressed through comprehensive psychiatric care involving a psychologist and psychiatrist.  The patient demonstrated good insight and was able to understand that these spells are not seizures. They expressed acceptance of this diagnosis and relief that the spells are not epileptic in nature. They were agreeable to follow-up care with mental health providers.  Strategies were discussed to manage the spells, including focusing on breathing and simple hand movements, to potentially reduce the intensity of the episodes. The patient and family agreed to try these techniques.  It was emphasized that when a spell occurs, the patient should concentrate on deep breathing and gently opening and closing their fist.  Behavioral health treatment was recommended, and the patient agreed to follow up with a mental health care therapist and psychiatrist.  The patient was provided with follow-up call from the [facility] provider was scheduled for four weeks.  During the debriefing with the patient, it was reiterated that these spells are non-epileptic (psychogenic seizures or pseudoseizures) and not true seizures. It was explained that these spells do not cause brain damage, and the EEG results are normal. The importance of engaging a dedicated mental health care provider for treatment was emphasized. The patient does have a history of PTSD, and addressing these issues with a mental health care provider was recommended.      Lesly Snyder MD

## 2024-04-25 ENCOUNTER — VIRTUAL VISIT (OUTPATIENT)
Dept: NEUROLOGY | Facility: CLINIC | Age: 39
End: 2024-04-25
Payer: COMMERCIAL

## 2024-04-25 DIAGNOSIS — F44.5 PSYCHOGENIC NONEPILEPTIC SEIZURE: Primary | ICD-10-CM

## 2024-04-25 DIAGNOSIS — R56.9 SEIZURES (H): ICD-10-CM

## 2024-04-25 NOTE — PROGRESS NOTES
"Call to patient following an inpatient VEEG evaluation.    Excerpt from the discharge summary dated 4/23/2024  \"Hospital course:  One target type 1 spell was recorded without EEG correlate to show electrographic seizure. Spell was consistent with psychogenic nonepileptic spell. This is not a new diagnosis for patient and he is following with a therapist (one who specializes in eating disorders) and a psychiatrist. We did discuss consideration to additionally follow with a therapist who doesn't only specialize in eating disorders. He is accepting of diagnosis. No changes were made to his PTA lamotrigine and topiramate. \"      Patient has been doing okay since discharge  He took a couple of days off work to recover from the stay and will return to work tomorrow.    We discussed that the target event was recorded and was found to be a non-epileptic spell. Javi said that this was good news, as it means the medications are working for his seizures.  He confirmed that none of the medications changed    We discussed therapy follow up. Javi will talk with the current therapist, whom he sees for his eating disorder, to see if they are comfortable and familiar with treating psychogenic non-epileptic events.If not I recommended that he ask the therapist if there is another therapist they would recommend.    We discussed scheduled follow up. Javi asked if I could check with  to see if he could do a video call instead of an in person visit as he has other appointments scheduled that day    Future Appointments   Date Time Provider Department Center   5/3/2024  9:30 AM Kitty Simon PA [facility]   5/6/2024 11:30 AM Swati Hernandez MD [facility]   5/15/2024 12:45 PM Lesly Snyder MD [facility]     No other questions at this time.  Will message  about follow up.  Patient instructed to call if questions or concerns arise  "

## 2024-05-03 ENCOUNTER — VIRTUAL VISIT (OUTPATIENT)
Dept: NEUROLOGY | Facility: CLINIC | Age: 39
End: 2024-05-03
Payer: COMMERCIAL

## 2024-05-03 VITALS — BODY MASS INDEX: 38.65 KG/M2 | WEIGHT: 270 LBS | HEIGHT: 70 IN

## 2024-05-03 DIAGNOSIS — G40.109 FOCAL EPILEPSY (H): ICD-10-CM

## 2024-05-03 DIAGNOSIS — F44.5 PSYCHOGENIC NONEPILEPTIC SEIZURE: Primary | ICD-10-CM

## 2024-05-03 PROCEDURE — 99495 TRANSJ CARE MGMT MOD F2F 14D: CPT | Mod: 95 | Performed by: PHYSICIAN ASSISTANT

## 2024-05-03 ASSESSMENT — PAIN SCALES - GENERAL: PAINLEVEL: NO PAIN (0)

## 2024-05-03 NOTE — PROGRESS NOTES
"[facility] Epilepsy Care Progress Note      Patient:  Javi Brown  :  1985   Age:  38 year old   Today's Video Visit:  5/3/2024    Epilepsy Data:  Patient is a 38 year old right-handed male with a history of focal epilepsy, migraines, depression, anxiety, borderline personality disorder, PTSD and documented psychogenic nonepileptic spells.  First seizure was 2021. At the time he was taking lamotrigine for his mood. This was started in 2020. Despite this and increases in lamotrigine, his seizure-like spells have continued. An EEG at [place] in 2021 showed epileptogenic discharge over the central region, consistent with a partial seizure disorder. One subclinical seizure arising from the central region was recorded.     Seizure types:  Type 1: No loss of awareness. Has right hand shaking sometimes with distorted posturing of right hand/wrist. He can talk. Usually happens in the evenings. Happens multiple times a week.  Type 2: May feel \"off prior\". Events of staring and unresponsiveness. 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 Happening few times a week.   Type 3: Generalized tonic-clonic. Maybe last one 2022 when not taking his medications    Type 4: Possible focal seizure. Looks to ceiling, eye deviation lip smacking and loss of awareness. Not currently happening.     Inpatient vEEG Monitoring 24: The 5 days of VEEG is abnormal due presence of a generalized, intermittent theta slowing consistent with a mild, diffuse encephalopathy. There were 2 events in this recording. First event occurred on day 3. Patient reported a \"weird feeling in stomach\", slumping left then right, mouth movements and reporting tingling of feet/tips of fingers. Patient was not responding appropriately to baseline testing after the event. Underlying VEEG did not show seizures. The second event occurred on day 4 which consisted of right hand rhythmic shaking with unresponsiveness that had " no EEG correlate to suggest seizure activity (target spell). These events were psychogenic non-epileptic events. There were no epileptiform discharges or electrographic seizures in the recording.     Brain MRI at [facility] 4/21/23 showed T2 hyperintense appearance of the left hippocampus suggesting mesial temporal sclerosis.Unchanged small focus of right lateral frontal encephalomalacia. No evidence for intracranial hemorrhage, mass, acute infarct or focal mass lesion..     History of Present Illness:   Goal of recent admission was to record spell type 1 with right hand shaking. These were recorded and found to be nonepileptic. Patient understands these are not electrographic seizures. He reports has had a few since being discharged. He has maintained awareness with them.  He continues with his therapist, who specializes in eating disorders. I encouraged him to speak with therapist about CBT or finding a therapist who he can do CBT with. He does report brief spells of losing time or zoning out.      Current Outpatient Medications   Medication Sig Dispense Refill    acetaminophen 500 MG CAPS Take 500 mg by mouth as needed       cyanocobalamin (VITAMIN B-12) 1000 MCG SUBL sublingual tablet Place 1,000 mcg under the tongue daily      diazepam (DIAZEPAM INTENSOL) 5 MG/ML (HIGH CONC) solution Diazepam: Give 1 ml (5mg): Give 1 ml for seizures greater 5 minutes or more than 2 seizures within an 8 hour period. May repeat once 1 ml (5mg). Monitor breathing, if there any concerns call 911. Do not exceed 10 mg per day. 30 mL 0    Fremanezumab-vfrm (AJOVY) 225 MG/1.5ML SOAJ Inject 225 mg Subcutaneous every 30 days 1.5 mL 11    hydrOXYzine (VISTARIL) 25 MG capsule Take 25-50 mg by mouth 2 times daily as needed for anxiety Pt states taking as needed as well.      lamoTRIgine (LAMICTAL) 100 MG TBDP ODT tab Take 3 tablets (300 mg) by mouth 2 times daily 540 tablet 3    ondansetron (ZOFRAN ODT) 4 MG ODT tab Take 1 tablet (4 mg) by mouth  "every 8 hours as needed for nausea 18 tablet 11    Pediatric Multi Vit-Extra C-FA (FLINTSTONES/EXTRA C) CHEW Take 1 tablet by mouth every morning       rizatriptan (MAXALT-MLT) 10 MG ODT Take 1 tablet (10 mg) by mouth at onset of headache for migraine May repeat in 2 hours. Max 3 tablets/24 hours. 18 tablet 11    sertraline (ZOLOFT) 100 MG tablet Take 100 mg by mouth 2 times daily      topiramate (TOPAMAX) 100 MG tablet Take 1 tablet (100 mg) by mouth 2 times daily 180 tablet 3    traZODone (DESYREL) 50 MG tablet Take  mg by mouth nightly as needed for sleep (after hydroxyzine)      vitamin D3 (CHOLECALCIFEROL) 50 mcg (2000 units) tablet Take 1 tablet by mouth daily          Medication Notes:  lamotrigine 300-300, topiramate 100-100      AED Medication Compliance:  compliant most of the time  Using a pill box:  Yes    Review of Systems:  Headaches-follows with Dr. Hernandez  Poor memory    Other Issues:    Is patient safe to drive: no    Exam:  NAD. Alert and oriented. Speech fluent. Hearing intact to normal conversation. Not overtly depressed or anxious.   Ht 1.778 m (5' 10\")   Wt 122.5 kg (270 lb)   BMI 38.74 kg/m       Wt Readings from Last 5 Encounters:   05/03/24 122.5 kg (270 lb)   04/19/24 122.7 kg (270 lb 9.6 oz)   01/23/24 120.2 kg (265 lb)   09/11/23 113.4 kg (250 lb)   03/14/23 118.8 kg (262 lb)       Assessment:   1.Psychogenic nonepileptic spells. These have been well documented on EEG's. Encouraged patient to work with therapist to start CBT in future.     2. Focal epilepsy. An EEG at [place] in 1/12/2021 showed epileptogenic discharge over the central region, consistent with a partial seizure disorder. One subclinical seizure arising from the central region was recorded.     3. Spells of zoning out/losing time. We did discuss previously staring spells were recorded and these were nonepileptic. He reports sometimes these happen several times a day. Given that we did not note any seizures while " recently admitted is reassuring. I encouraged him to have his wife pay attention to when they occur and try to engage with him to see if he is responsive. If they are continuing frequently we could consider an ambulatory EEG.    Plan:  -no change to lamotrigine or topiramate  -explore options for CBT  -follow-up as scheduled with Dr. Snyder 5/15/24      Kitty Simon PA-C    Transition Care Management Services  Admission Date: 4/19/24  Discharge date: 4/23/24  Discharge diagnosis: PNES, focal epilepsy  Interactive contact date: 4/25/24  Face-to-face visit date: 5/3/2024    Medical complexity decision making: Moderate complexity ([id number])      Total time on video today 20 min. Additional 5 min reviewing chart prior to visit. Additional 5 min generating note and coordinating care following visit. So total of 30 min, all on day of visit.

## 2024-05-03 NOTE — PROGRESS NOTES
Virtual Visit Details    Type of service:  Video Visit     Originating Location (pt. Location): Other work    Distant Location (provider location):  On-site  Platform used for Video Visit: Eric

## 2024-05-03 NOTE — PATIENT INSTRUCTIONS
Continue lamotrigine and topiramate at current doses  Explore options for CBT  Follow-up as scheduled with Dr. Snyder 5/15/24  Call clinic at [phone] with questions or concerns

## 2024-05-03 NOTE — LETTER
"5/3/2024       RE: Javi Brown  [address] [place] [address]       Dear Colleague,    Thank you for referring your patient, Javi Brown, to the [facility] [place] at [facility]. Please see a copy of my visit note below.      [facility]      Patient:  Javi Brown  :  1985   Age:  38 year old   Today's Video Visit:  5/3/2024    Epilepsy Data:  Patient is a 38 year old right-handed male with a history of focal epilepsy, migraines, depression, anxiety, borderline personality disorder, PTSD and documented psychogenic nonepileptic spells.  First seizure was 2021. At the time he was taking lamotrigine for his mood. This was started in 2020. Despite this and increases in lamotrigine, his seizure-like spells have continued. An EEG at [place] in 2021 showed epileptogenic discharge over the central region, consistent with a partial seizure disorder. One subclinical seizure arising from the central region was recorded.     Seizure types:  Type 1: No loss of awareness. Has right hand shaking sometimes with distorted posturing of right hand/wrist. He can talk. Usually happens in the evenings. Happens multiple times a week.  Type 2: May feel \"off prior\". Events of staring and unresponsiveness. 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 Happening few times a week.   Type 3: Generalized tonic-clonic. Maybe last one 2022 when not taking his medications    Type 4: Possible focal seizure. Looks to ceiling, eye deviation lip smacking and loss of awareness. Not currently happening.     Inpatient vEEG Monitoring 24: The 5 days of VEEG is abnormal due presence of a generalized, intermittent theta slowing consistent with a mild, diffuse encephalopathy. There were 2 events in this recording. First event occurred on day 3. Patient reported a \"weird feeling in " "stomach\", slumping left then right, mouth movements and reporting tingling of feet/tips of fingers. Patient was not responding appropriately to baseline testing after the event. Underlying VEEG did not show seizures. The second event occurred on day 4 which consisted of right hand rhythmic shaking with unresponsiveness that had no EEG correlate to suggest seizure activity (target spell). These events were psychogenic non-epileptic events. There were no epileptiform discharges or electrographic seizures in the recording.     Brain MRI at [facility] 4/21/23 showed T2 hyperintense appearance of the left hippocampus suggesting mesial temporal sclerosis.Unchanged small focus of right lateral frontal encephalomalacia. No evidence for intracranial hemorrhage, mass, acute infarct or focal mass lesion..     History of Present Illness:   Goal of recent admission was to record spell type 1 with right hand shaking. These were recorded and found to be nonepileptic. Patient understands these are not electrographic seizures. He reports has had a few since being discharged. He has maintained awareness with them.  He continues with his therapist, who specializes in eating disorders. I encouraged him to speak with therapist about CBT or finding a therapist who he can do CBT with. He does report brief spells of losing time or zoning out.      Current Outpatient Medications   Medication Sig Dispense Refill    acetaminophen 500 MG CAPS Take 500 mg by mouth as needed       cyanocobalamin (VITAMIN B-12) 1000 MCG SUBL sublingual tablet Place 1,000 mcg under the tongue daily      diazepam (DIAZEPAM INTENSOL) 5 MG/ML (HIGH CONC) solution Diazepam: Give 1 ml (5mg): Give 1 ml for seizures greater 5 minutes or more than 2 seizures within an 8 hour period. May repeat once 1 ml (5mg). Monitor breathing, if there any concerns call 911. Do not exceed 10 mg per day. 30 mL 0    Fremanezumab-vfrm (AJOVY) 225 MG/1.5ML SOAJ Inject 225 mg Subcutaneous every 30 " "days 1.5 mL 11    hydrOXYzine (VISTARIL) 25 MG capsule Take 25-50 mg by mouth 2 times daily as needed for anxiety Pt states taking as needed as well.      lamoTRIgine (LAMICTAL) 100 MG TBDP ODT tab Take 3 tablets (300 mg) by mouth 2 times daily 540 tablet 3    ondansetron (ZOFRAN ODT) 4 MG ODT tab Take 1 tablet (4 mg) by mouth every 8 hours as needed for nausea 18 tablet 11    Pediatric Multi Vit-Extra C-FA (FLINTSTONES/EXTRA C) CHEW Take 1 tablet by mouth every morning       rizatriptan (MAXALT-MLT) 10 MG ODT Take 1 tablet (10 mg) by mouth at onset of headache for migraine May repeat in 2 hours. Max 3 tablets/24 hours. 18 tablet 11    sertraline (ZOLOFT) 100 MG tablet Take 100 mg by mouth 2 times daily      topiramate (TOPAMAX) 100 MG tablet Take 1 tablet (100 mg) by mouth 2 times daily 180 tablet 3    traZODone (DESYREL) 50 MG tablet Take  mg by mouth nightly as needed for sleep (after hydroxyzine)      vitamin D3 (CHOLECALCIFEROL) 50 mcg (2000 units) tablet Take 1 tablet by mouth daily          Medication Notes:  lamotrigine 300-300, topiramate 100-100      AED Medication Compliance:  compliant most of the time  Using a pill box:  Yes    Review of Systems:  Headaches-follows with Dr. Hernandez  Poor memory    Other Issues:    Is patient safe to drive: no    Exam:  NAD. Alert and oriented. Speech fluent. Hearing intact to normal conversation. Not overtly depressed or anxious.   Ht 1.778 m (5' 10\")   Wt 122.5 kg (270 lb)   BMI 38.74 kg/m       Wt Readings from Last 5 Encounters:   05/03/24 122.5 kg (270 lb)   04/19/24 122.7 kg (270 lb 9.6 oz)   01/23/24 120.2 kg (265 lb)   09/11/23 113.4 kg (250 lb)   03/14/23 118.8 kg (262 lb)       Assessment:   1.Psychogenic nonepileptic spells. These have been well documented on EEG's. Encouraged patient to work with therapist to start CBT in future.     2. Focal epilepsy. An EEG at [place] in 1/12/2021 showed epileptogenic discharge over the central region, consistent with " a partial seizure disorder. One subclinical seizure arising from the central region was recorded.     3. Spells of zoning out/losing time. We did discuss previously staring spells were recorded and these were nonepileptic. He reports sometimes these happen several times a day. Given that we did not note any seizures while recently admitted is reassuring. I encouraged him to have his wife pay attention to when they occur and try to engage with him to see if he is responsive. If they are continuing frequently we could consider an ambulatory EEG.    Plan:  -no change to lamotrigine or topiramate  -explore options for CBT  -follow-up as scheduled with Dr. Snyder 5/15/24      Kitty Simon PA-C    Transition Care Management Services  Admission Date: 4/19/24  Discharge date: 4/23/24  Discharge diagnosis: PNES, focal epilepsy  Interactive contact date: 4/25/24  Face-to-face visit date: 5/3/2024    Medical complexity decision making: Moderate complexity ([id number])      Total time on video today 20 min. Additional 5 min reviewing chart prior to visit. Additional 5 min generating note and coordinating care following visit. So total of 30 min, all on day of visit.        Again, thank you for allowing me to participate in the care of your patient.      Sincerely,    CASSANDRA Rodriguez

## 2024-05-03 NOTE — NURSING NOTE
Is the patient currently in the state of [place]? YES    Visit mode:VIDEO    If the visit is dropped, the patient can be reconnected by: VIDEO VISIT: Send to e-mail at: [email or web address]    Will anyone else be joining the visit? NO  (If patient encounters technical issues they should call [phone]    How would you like to obtain your AVS? MyChart    Are changes needed to the allergy or medication list? Pt stated no changes to allergies and Pt stated no med changes    Are refills needed on medications prescribed by this physician? NO    Reason for visit: LEILANI LAYF

## 2024-05-06 ENCOUNTER — VIRTUAL VISIT (OUTPATIENT)
Dept: NEUROLOGY | Facility: CLINIC | Age: 39
End: 2024-05-06
Payer: COMMERCIAL

## 2024-05-06 VITALS — BODY MASS INDEX: 38.65 KG/M2 | HEIGHT: 70 IN | WEIGHT: 270 LBS

## 2024-05-06 DIAGNOSIS — G43.709 CHRONIC MIGRAINE WITHOUT AURA WITHOUT STATUS MIGRAINOSUS, NOT INTRACTABLE: ICD-10-CM

## 2024-05-06 PROCEDURE — G2211 COMPLEX E/M VISIT ADD ON: HCPCS | Mod: 95 | Performed by: PSYCHIATRY & NEUROLOGY

## 2024-05-06 PROCEDURE — 99213 OFFICE O/P EST LOW 20 MIN: CPT | Mod: 95 | Performed by: PSYCHIATRY & NEUROLOGY

## 2024-05-06 RX ORDER — FREMANEZUMAB-VFRM 225 MG/1.5ML
1.5 INJECTION SUBCUTANEOUS
Qty: 1.5 ML | Refills: 11 | Status: SHIPPED | OUTPATIENT
Start: 2024-05-06

## 2024-05-06 RX ORDER — ONDANSETRON 4 MG/1
4 TABLET, ORALLY DISINTEGRATING ORAL EVERY 8 HOURS PRN
Qty: 18 TABLET | Refills: 11 | Status: SHIPPED | OUTPATIENT
Start: 2024-05-06

## 2024-05-06 RX ORDER — RIZATRIPTAN BENZOATE 10 MG/1
10 TABLET, ORALLY DISINTEGRATING ORAL
Qty: 18 TABLET | Refills: 11 | Status: SHIPPED | OUTPATIENT
Start: 2024-05-06

## 2024-05-06 ASSESSMENT — MIGRAINE DISABILITY ASSESSMENT (MIDAS)
HOW MANY DAYS IN THE PAST 3 MONTHS HAVE YOU HAD A HEADACHE: 10
TOTAL SCORE: 11
HOW OFTEN WERE SOCIAL ACTIVITIES MISSED DUE TO HEADACHES: 2
ON A SCALE FROM 0-10 ON AVERAGE HOW PAINFUL WERE HEADACHES: 5
HOW MANY DAYS DID YOU MISS WORK OR SCHOOL BECAUSE OF HEADACHES: 3
HOW MANY DAYS WAS YOUR PRODUCTIVITY CUT IN HALF BECAUSE OF HEADACHES: 5
HOW MANY DAYS WAS HOUSEWORK PRODUCTIVITY CUT IN HALF DUE TO HEADACHES: 1
HOW MANY DAYS DID YOU NOT DO HOUSEWORK BECAUSE OF HEADACHES: 0

## 2024-05-06 ASSESSMENT — HEADACHE IMPACT TEST (HIT 6)
WHEN YOU HAVE A HEADACHE HOW OFTEN DO YOU WISH YOU COULD LIE DOWN: VERY OFTEN
WHEN YOU HAVE HEADACHES HOW OFTEN IS THE PAIN SEVERE: VERY OFTEN
HOW OFTEN HAVE YOU FELT FED UP OR IRRITATED BECAUSE OF YOUR HEADACHES: RARELY
HOW OFTEN HAVE YOU FELT TOO TIRED TO WORK BECAUSE OF YOUR HEADACHES: SOMETIMES
HIT6 TOTAL SCORE: 60
HOW OFTEN DID HEADACHS LIMIT CONCENTRATION ON WORK OR DAILY ACTIVITY: SOMETIMES
HOW OFTEN DO HEADACHES LIMIT YOUR DAILY ACTIVITIES: SOMETIMES

## 2024-05-06 ASSESSMENT — PAIN SCALES - GENERAL: PAINLEVEL: SEVERE PAIN (6)

## 2024-05-06 NOTE — NURSING NOTE
Is the patient currently in the state of [place]? YES    Visit mode:VIDEO    If the visit is dropped, the patient can be reconnected by: VIDEO VISIT: Text to cell phone:   Telephone Information:   Mobile [phone]       Will anyone else be joining the visit? NO  (If patient encounters technical issues they should call [phone])    How would you like to obtain your AVS? MyChart    Are changes needed to the allergy or medication list? No    Are refills needed on medications prescribed by this physician? YES    Reason for visit: Follow Up    Maddy BANDA

## 2024-05-06 NOTE — PROGRESS NOTES
"Virtual Visit Details    Type of service:  Video Visit     Originating Location (pt. Location): Home    Distant Location (provider location):  On-site  Platform used for Video Visit: [facility]    Headache Neurology Progress Note  May 6, 2024    Subjective:    Javi Brown returns for follow up of chronic migraine.    Today, he reports headaches continue to occur. Less frequent with Ajovy. Has 8/30 headache days per month, with 1-2/30 severe headache days per month.    Rizatriptan ODT 10 mg is helpful. Sumatriptan worked better but with side effects.    He tried Ajovy, which is helpful. He has forgotten it and noticed an increase in headache. No trouble injecting; no side effects.    He did a 5 day EMU stay for characterization of spells. New spell is non-epileptic. Continues on seizure medications as before.     Objective:    Vitals: Ht 1.778 m (5' 10\")   Wt 122.5 kg (270 lb)   BMI 38.74 kg/m    General: Cooperative, NAD  Neurologic:  Mental Status: Fully alert, attentive and oriented. Speech clear and fluent.   Cranial Nerves: Facial movements symmetric.   Motor: No abnormal movements.      Pertinent Investigations:          5/6/2024    11:18 AM   HIT-6   When you have headaches, how often is the pain severe 11   How often do headaches limit your ability to do usual daily activities including household work, work, school, or social activities? 10   When you have a headache, how often do you wish you could lie down? 11   In the past 4 weeks, how often have you felt too tired to do work or daily activities because of your headaches 10   In the past 4 weeks, how often have you felt fed up or irritated because of your headaches 8   In the past 4 weeks, how often did headaches limit your ability to concentrate on work or daily activities 10   HIT-6 Total Score 60           5/6/2024    11:20 AM   MIDAS - in the past three months:   On how many days did you miss work or " school because of your headaches? 3   How many days was your productivity at work or school reduced by half or more because of your headaches? 5   On how many days did you not do household work because of your headaches? 0   How many days was your productivity in household work reduced by half or more because of your headaches? 1   On how many days did you miss family, social, or leisure activities because of your headaches? 2   On how many days did you have a headache? 10   On a scale of 0-10, on average how painful were these headaches? 5   MIDAS Score 11 (III - Moderate Disability)      Assessment/Plan:   Javi Brown is a 38 year old man with epileptic and nonepileptic seizures, and chronic migraine who returns for follow-up.  Headaches remain frequent and severe, and are not as responsive to Emgality subcutaneous injection.     We obtained an updated MRI of the brain to evaluate for potential secondary causes of headache.  This returned reassuring.  There continues to be medial temporal sclerosis on the left and right lateral frontal encephalomalacia.  These are not thought to be contributing to headache.     We discussed trialing alternative rescue and preventative therapies for migraine.  - For acute treatment, I recommend a trial of rizatriptan 10 mg taken at the onset of headache, with a repeat dose in 2 hours if needed.  This should not exceed more than 9 days/month to avoid medication overuse.  -This will be used as a substitute for subcutaneous sumatriptan.     His headache frequency and severity warrant prevention.  Previously Emgality had been helpful.    - I recommend continuing Ajovy subcutaneous injection every 30 days.       -Botulinum toxin injections or an alternative CGRP inhibitor will be considered in the future, if needed.    -Would avoid beta-blockers given depression history. Would also avoid TCA's as they are known to lower the seizure threshold and would be concerned for excess  side-effects w/ his concurrent medications.      I will plan to see him back in 12 months to monitor his progress.      The longitudinal plan of care for Javi was addressed during this visit. Due to the added complexity in care, I will continue to support Javi in the subsequent management of this condition(s) and with the ongoing continuity of care of this condition(s).    Swati Hernandez MD  Neurology

## 2024-05-06 NOTE — LETTER
"5/6/2024       RE: Javi Brown  [address] [place] [address]     Dear Colleague,    Thank you for referring your patient, Javi Brown, to the [facility] NEUROLOGY CLINIC [place] at [facility]. Please see a copy of my visit note below.      [facility]    Headache Neurology Progress Note  May 6, 2024    Subjective:    Javi Brown returns for follow up of chronic migraine.    Today, he reports headaches continue to occur. Less frequent with Ajovy. Has 8/30 headache days per month, with 1-2/30 severe headache days per month.    Rizatriptan ODT 10 mg is helpful. Sumatriptan worked better but with side effects.    He tried Ajovy, which is helpful. He has forgotten it and noticed an increase in headache. No trouble injecting; no side effects.    He did a 5 day EMU stay for characterization of spells. New spell is non-epileptic. Continues on seizure medications as before.     Objective:    Vitals: Ht 1.778 m (5' 10\")   Wt 122.5 kg (270 lb)   BMI 38.74 kg/m    General: Cooperative, NAD  Neurologic:  Mental Status: Fully alert, attentive and oriented. Speech clear and fluent.   Cranial Nerves: Facial movements symmetric.   Motor: No abnormal movements.      Pertinent Investigations:          5/6/2024    11:18 AM   HIT-6   When you have headaches, how often is the pain severe 11   How often do headaches limit your ability to do usual daily activities including household work, work, school, or social activities? 10   When you have a headache, how often do you wish you could lie down? 11   In the past 4 weeks, how often have you felt too tired to do work or daily activities because of your headaches 10   In the past 4 weeks, how often have you felt fed up or irritated because of your headaches 8   In the past 4 weeks, how often did headaches limit your ability to concentrate on work or daily activities 10 "   HIT-6 Total Score 60           5/6/2024    11:20 AM   MIDAS - in the past three months:   On how many days did you miss work or school because of your headaches? 3   How many days was your productivity at work or school reduced by half or more because of your headaches? 5   On how many days did you not do household work because of your headaches? 0   How many days was your productivity in household work reduced by half or more because of your headaches? 1   On how many days did you miss family, social, or leisure activities because of your headaches? 2   On how many days did you have a headache? 10   On a scale of 0-10, on average how painful were these headaches? 5   MIDAS Score 11 (III - Moderate Disability)      Assessment/Plan:   Javi Borwn is a 38 year old man with epileptic and nonepileptic seizures, and chronic migraine who returns for follow-up.  Headaches remain frequent and severe, and are not as responsive to Emgality subcutaneous injection.     We obtained an updated MRI of the brain to evaluate for potential secondary causes of headache.  This returned reassuring.  There continues to be medial temporal sclerosis on the left and right lateral frontal encephalomalacia.  These are not thought to be contributing to headache.     We discussed trialing alternative rescue and preventative therapies for migraine.  - For acute treatment, I recommend a trial of rizatriptan 10 mg taken at the onset of headache, with a repeat dose in 2 hours if needed.  This should not exceed more than 9 days/month to avoid medication overuse.  -This will be used as a substitute for subcutaneous sumatriptan.     His headache frequency and severity warrant prevention.  Previously Emgality had been helpful.    - I recommend continuing Ajovy subcutaneous injection every 30 days.       -Botulinum toxin injections or an alternative CGRP inhibitor will be considered in the future, if needed.    -Would avoid beta-blockers given  depression history. Would also avoid TCA's as they are known to lower the seizure threshold and would be concerned for excess side-effects w/ his concurrent medications.      I will plan to see him back in 12 months to monitor his progress.      The longitudinal plan of care for Javi was addressed during this visit. Due to the added complexity in care, I will continue to support Javi in the subsequent management of this condition(s) and with the ongoing continuity of care of this condition(s).            Again, thank you for allowing me to participate in the care of your patient.      Sincerely,    Swati Hernandez MD

## 2024-05-15 ENCOUNTER — VIRTUAL VISIT (OUTPATIENT)
Dept: NEUROLOGY | Facility: CLINIC | Age: 39
End: 2024-05-15
Payer: COMMERCIAL

## 2024-05-15 VITALS — BODY MASS INDEX: 38.37 KG/M2 | HEIGHT: 70 IN | WEIGHT: 268 LBS

## 2024-05-15 DIAGNOSIS — R40.4 NONSPECIFIC PAROXYSMAL SPELL: Primary | ICD-10-CM

## 2024-05-15 ASSESSMENT — PAIN SCALES - GENERAL: PAINLEVEL: NO PAIN (0)

## 2024-05-15 NOTE — PATIENT INSTRUCTIONS
-continue lamotrigine (100 mg ODT) 300 mg twice a day, topiramate (100 mg ) 100 mg twice a day   - continue mental health therapy   - follow-up 4 months   - driving: no (he continues to have non epileptic spell)    Lesly Snyder MD

## 2024-05-15 NOTE — NURSING NOTE
Patient stated no medication changes and declined review.      Is the patient currently in the state of [place]? YES    Visit mode:VIDEO    If the visit is dropped, the patient can be reconnected by: VIDEO VISIT: Text to cell phone:   Telephone Information:   Mobile [phone]    and VIDEO VISIT: Send to e-mail at: [email or web address]    Will anyone else be joining the visit? NO  (If patient encounters technical issues they should call [phone])    How would you like to obtain your AVS? MyChart    Are changes needed to the allergy or medication list? Pt declined med review and Pt stated no med changes    Are refills needed on medications prescribed by this physician? NO    Reason for visit: LEILANI BANDA

## 2024-05-15 NOTE — PROGRESS NOTES
"[facility] Epilepsy Care Progress Note    Patient:  Javi Brown  :  1985   Age:  38 year old   Today's Office Visit:  5/15/2024        HPI:   Patient is a 38 year old right-handed male with a history of focal epilepsy, migraines, depression, anxiety, borderline personality disorder, PTSD and documented psychogenic nonepileptic spells.   An EEG at [place] in 2021 showed epileptogenic discharge over the central region, consistent with a partial seizure disorder. One subclinical seizure arising from the central region was recorded. He states he may have episodes related to possible drop in blood glucose. He had recent meeting with Kitty Reilly. His vitamin D is 19, he will saw his primary care provider and they are supplementing this. He is working with his therapist on emotional. We reviewed recent inpatient Video EEG admission. Patient understands these are not electrographic seizures.     Seizure types:  Type 1: No loss of awareness. Has right hand shaking sometimes with distorted posturing of right hand/wrist. He can talk. Usually happens in the evenings. Frequency - two times per week.   Type 2: May feel \"off prior\". Events of staring and unresponsiveness. 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 Frequency: 1-2 per week.   Type 3: Generalized tonic-clonic. Maybe last one 2022 when not taking his medications    Type 4: Possible focal seizure. Looks to ceiling, eye deviation lip smacking and loss of awareness. Not currently happening.     Inpatient vEEG Monitoring 24: The 5 days of VEEG is abnormal due presence of a generalized, intermittent theta slowing consistent with a mild, diffuse encephalopathy. There were 2 events in this recording. First event occurred on day 3. Patient reported a \"weird feeling in stomach\", slumping left then right, mouth movements and reporting tingling of feet/tips of fingers. Patient was not responding appropriately to baseline testing after the " event. Underlying VEEG did not show seizures. The second event occurred on day 4 which consisted of right hand rhythmic shaking with unresponsiveness that had no EEG correlate to suggest seizure activity (target spell). These events were psychogenic non-epileptic events. There were no epileptiform discharges or electrographic seizures in the recording.     Brain MRI at [facility] 4/21/23 showed T2 hyperintense appearance of the left hippocampus suggesting mesial temporal sclerosis.Unchanged small focus of right lateral frontal encephalomalacia. No evidence for intracranial hemorrhage, mass, acute infarct or focal mass lesion..       Current Outpatient Medications   Medication Sig Dispense Refill    acetaminophen 500 MG CAPS Take 500 mg by mouth as needed       cyanocobalamin (VITAMIN B-12) 1000 MCG SUBL sublingual tablet Place 1,000 mcg under the tongue daily      diazepam (DIAZEPAM INTENSOL) 5 MG/ML (HIGH CONC) solution Diazepam: Give 1 ml (5mg): Give 1 ml for seizures greater 5 minutes or more than 2 seizures within an 8 hour period. May repeat once 1 ml (5mg). Monitor breathing, if there any concerns call 911. Do not exceed 10 mg per day. 30 mL 0    Fremanezumab-vfrm (AJOVY) 225 MG/1.5ML SOAJ Inject 225 mg Subcutaneous every 30 days 1.5 mL 11    hydrOXYzine (VISTARIL) 25 MG capsule Take 25-50 mg by mouth 2 times daily as needed for anxiety Pt states taking as needed as well.      lamoTRIgine (LAMICTAL) 100 MG TBDP ODT tab Take 3 tablets (300 mg) by mouth 2 times daily 540 tablet 3    ondansetron (ZOFRAN ODT) 4 MG ODT tab Take 1 tablet (4 mg) by mouth every 8 hours as needed for nausea 18 tablet 11    Pediatric Multi Vit-Extra C-FA (FLINTSTONES/EXTRA C) CHEW Take 1 tablet by mouth every morning       rizatriptan (MAXALT-MLT) 10 MG ODT Take 1 tablet (10 mg) by mouth at onset of headache for migraine May repeat in 2 hours. Max 3 tablets/24 hours. 18 tablet 11    sertraline (ZOLOFT) 100 MG tablet Take 100 mg by mouth 2  "times daily      topiramate (TOPAMAX) 100 MG tablet Take 1 tablet (100 mg) by mouth 2 times daily 180 tablet 3    traZODone (DESYREL) 50 MG tablet Take  mg by mouth nightly as needed for sleep (after hydroxyzine)      vitamin D3 (CHOLECALCIFEROL) 50 mcg (2000 units) tablet Take 1 tablet by mouth daily          Medication Notes:  lamotrigine (100 mg ODT) 300 mg twice a day, topiramate (100 mg ) 100 mg twice a day       AED Medication Compliance:  compliant most of the time  Using a pill box:  Yes    Other Issues:    Is patient safe to drive: no    Exam:  Ht 5' 10\" (177.8 cm)   Wt 268 lb (121.6 kg)   BMI 38.45 kg/m       Wt Readings from Last 5 Encounters:   05/15/24 268 lb (121.6 kg)   05/06/24 270 lb (122.5 kg)   05/03/24 270 lb (122.5 kg)   04/19/24 270 lb 9.6 oz (122.7 kg)   01/23/24 265 lb (120.2 kg)     Alert, orientated, speech is fluent, face symmetric, tongue midline, extra ocular movements in tact, no pronator drip, arm circumduction is symmetric, finger to nose normal.     Assessment:   1.Psychogenic nonepileptic spells. These have been well documented on EEG's. Encouraged patient to work with therapist to start CBT in future.     2. Focal epilepsy. An EEG at [place] in 1/12/2021 showed epileptogenic discharge over the central region, consistent with a partial seizure disorder. One subclinical seizure arising from the central region was recorded. Continue seizure antiepileptic drug noted below.       Plan:  -continue lamotrigine (100 mg ODT) 300 mg twice a day, topiramate (100 mg ) 100 mg twice a day   - continue mental health therapy   - follow-up 6 months   - driving: no (he continues to have non epileptic spell)    Lesly Snyder MD                 "

## 2024-05-15 NOTE — LETTER
"5/15/2024       RE: Javi Brown  : 1985   MRN: [id number]      Dear Colleague,    Thank you for referring your patient, Javi Brown, to the [facility] EPILEPSY CARE at [facility]. Please see a copy of my visit note below.    [facility]    Patient:  Javi Brown  :  1985   Age:  38 year old   Today's Office Visit:  5/15/2024        HPI:   Patient is a 38 year old right-handed male with a history of focal epilepsy, migraines, depression, anxiety, borderline personality disorder, PTSD and documented psychogenic nonepileptic spells.   An EEG at [place] in 2021 showed epileptogenic discharge over the central region, consistent with a partial seizure disorder. One subclinical seizure arising from the central region was recorded. He states he may have episodes related to possible drop in blood glucose. He had recent meeting with Kitty Reilly. His vitamin D is 19, he will saw his primary care provider and they are supplementing this. He is working with his therapist on emotional. We reviewed recent inpatient Video EEG admission. Patient understands these are not electrographic seizures.     Seizure types:  Type 1: No loss of awareness. Has right hand shaking sometimes with distorted posturing of right hand/wrist. He can talk. Usually happens in the evenings. Frequency - two times per week.   Type 2: May feel \"off prior\". Events of staring and unresponsiveness. 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 Frequency: 1-2 per week.   Type 3: Generalized tonic-clonic. Maybe last one 2022 when not taking his medications    Type 4: Possible focal seizure. Looks to ceiling, eye deviation lip smacking and loss of awareness. Not currently happening.     Inpatient vEEG Monitoring 24: The 5 days of VEEG is abnormal due presence of a generalized, intermittent theta slowing consistent with a mild, " "diffuse encephalopathy. There were 2 events in this recording. First event occurred on day 3. Patient reported a \"weird feeling in stomach\", slumping left then right, mouth movements and reporting tingling of feet/tips of fingers. Patient was not responding appropriately to baseline testing after the event. Underlying VEEG did not show seizures. The second event occurred on day 4 which consisted of right hand rhythmic shaking with unresponsiveness that had no EEG correlate to suggest seizure activity (target spell). These events were psychogenic non-epileptic events. There were no epileptiform discharges or electrographic seizures in the recording.     Brain MRI at [facility] 4/21/23 showed T2 hyperintense appearance of the left hippocampus suggesting mesial temporal sclerosis.Unchanged small focus of right lateral frontal encephalomalacia. No evidence for intracranial hemorrhage, mass, acute infarct or focal mass lesion..       Current Outpatient Medications   Medication Sig Dispense Refill    acetaminophen 500 MG CAPS Take 500 mg by mouth as needed       cyanocobalamin (VITAMIN B-12) 1000 MCG SUBL sublingual tablet Place 1,000 mcg under the tongue daily      diazepam (DIAZEPAM INTENSOL) 5 MG/ML (HIGH CONC) solution Diazepam: Give 1 ml (5mg): Give 1 ml for seizures greater 5 minutes or more than 2 seizures within an 8 hour period. May repeat once 1 ml (5mg). Monitor breathing, if there any concerns call 911. Do not exceed 10 mg per day. 30 mL 0    Fremanezumab-vfrm (AJOVY) 225 MG/1.5ML SOAJ Inject 225 mg Subcutaneous every 30 days 1.5 mL 11    hydrOXYzine (VISTARIL) 25 MG capsule Take 25-50 mg by mouth 2 times daily as needed for anxiety Pt states taking as needed as well.      lamoTRIgine (LAMICTAL) 100 MG TBDP ODT tab Take 3 tablets (300 mg) by mouth 2 times daily 540 tablet 3    ondansetron (ZOFRAN ODT) 4 MG ODT tab Take 1 tablet (4 mg) by mouth every 8 hours as needed for nausea 18 tablet 11    Pediatric Multi " "Vit-Extra C-FA (FLINTSTONES/EXTRA C) CHEW Take 1 tablet by mouth every morning       rizatriptan (MAXALT-MLT) 10 MG ODT Take 1 tablet (10 mg) by mouth at onset of headache for migraine May repeat in 2 hours. Max 3 tablets/24 hours. 18 tablet 11    sertraline (ZOLOFT) 100 MG tablet Take 100 mg by mouth 2 times daily      topiramate (TOPAMAX) 100 MG tablet Take 1 tablet (100 mg) by mouth 2 times daily 180 tablet 3    traZODone (DESYREL) 50 MG tablet Take  mg by mouth nightly as needed for sleep (after hydroxyzine)      vitamin D3 (CHOLECALCIFEROL) 50 mcg (2000 units) tablet Take 1 tablet by mouth daily        Medication Notes:  lamotrigine (100 mg ODT) 300 mg twice a day, topiramate (100 mg ) 100 mg twice a day       AED Medication Compliance:  compliant most of the time  Using a pill box:  Yes    Other Issues:    Is patient safe to drive: no    Exam:  Ht 5' 10\" (177.8 cm)   Wt 268 lb (121.6 kg)   BMI 38.45 kg/m       Wt Readings from Last 5 Encounters:   05/15/24 268 lb (121.6 kg)   05/06/24 270 lb (122.5 kg)   05/03/24 270 lb (122.5 kg)   04/19/24 270 lb 9.6 oz (122.7 kg)   01/23/24 265 lb (120.2 kg)     Alert, orientated, speech is fluent, face symmetric, tongue midline, extra ocular movements in tact, no pronator drip, arm circumduction is symmetric, finger to nose normal.     Assessment:   1.Psychogenic nonepileptic spells. These have been well documented on EEG's. Encouraged patient to work with therapist to start CBT in future.     2. Focal epilepsy. An EEG at [place] in 1/12/2021 showed epileptogenic discharge over the central region, consistent with a partial seizure disorder. One subclinical seizure arising from the central region was recorded. Continue seizure antiepileptic drug noted below.     Plan:  -continue lamotrigine (100 mg ODT) 300 mg twice a day, topiramate (100 mg ) 100 mg twice a day   - continue mental health therapy   - follow-up 6 months   - driving: no (he continues to have non " rachid mata)    Again, thank you for allowing me to participate in the care of your patient.      Sincerely,    Lesly Snyder MD

## 2024-05-28 ENCOUNTER — TELEPHONE (OUTPATIENT)
Dept: NEUROLOGY | Facility: CLINIC | Age: 39
End: 2024-05-28

## 2024-05-28 NOTE — TELEPHONE ENCOUNTER
Call returned to patient    Patient reports he missed lamotrigine and Topiramate, at least 2 days worth., most likely Friday - Monday    Took his meds about 10am, then noticed the rash about 2.20pm,.  He noticed his right arm was itchy, with a rash about the size of a quarter or half dollar coin.    He does not feel it has worsened or improved since first noticing it  Skin is red, and there may have been small blisters initially. No broken skin.  He reports he is itchy along the length of his arm, but the rash is one small area.    Patient also reporting a severe migraine headache.    We discussed that there are many possible reasons for a rash with small blisters including contact dermatitis, and shingles, and the chances of a lamotrigine drug rash are small. However, with the recent 4 days of missed doses, possible small blistering at the site of the rash, he should get it checked out at a walk in clinic or urgent care ASAP.  For the headache, we discussed it could be a result of not having taken TOP for 4 days, or could be related to the rash, and should also be mentioned to the provider he goes to see.    No other questions at this time  Patient expressed understanding of recommendations

## 2024-05-28 NOTE — TELEPHONE ENCOUNTER
What is the concern that needs to be addressed by a nurse?         Patient missed a couple doses of his Lamotrigine 100mgs, he took his dose at 10 am this morning and now is having a very strong headache and theres is a rash starting on his right arm    May a detailed message be left on voicemail? yes    Date of last office visit: 5-15-24    Message routed to: mincep pool

## 2024-07-14 ENCOUNTER — HEALTH MAINTENANCE LETTER (OUTPATIENT)
Age: 39
End: 2024-07-14

## 2024-07-15 ENCOUNTER — TELEPHONE (OUTPATIENT)
Dept: NEUROLOGY | Facility: CLINIC | Age: 39
End: 2024-07-15
Payer: COMMERCIAL

## 2024-07-15 NOTE — TELEPHONE ENCOUNTER
Left Voicemail (1st Attempt) and Sent Mychart (1st Attempt) for the patient to call back and schedule the following:    Appointment type: Return Headache  Provider: Mary  Return date: around 5/6/2025   Specialty phone number: [phone]  Additional appointment(s) needed:   Additonal Notes: 1 year follow up    Karen Galeano on 7/15/2024 at 2:45 PM

## 2024-08-28 ENCOUNTER — TELEPHONE (OUTPATIENT)
Dept: NEUROLOGY | Facility: CLINIC | Age: 39
End: 2024-08-28
Payer: COMMERCIAL

## 2024-08-28 NOTE — TELEPHONE ENCOUNTER
Received phone call from [place] ED. Patient is currently in the emergency department to discuss seizure medications. He stopped both topiramate and lamotrigine 2 months ago for unclear reasons.  He did not endorse any specific side effects, perhaps he thought it was making him yawn more.  He may have had a seizure within the last 24 hours, has had a few staring off spells.  No clear GTC. He is at baseline now. On EEG obtained previously, he has had subclinical partial seizures, has a reported history of GTC not captured on EEG, and has had multiple staring spells captured on EEG without epileptiform correlate.  Given risk of Chapman-Ryan's with rapid increase of lamotrigine I recommended restarting topiramate. I also recommended loading dose with levetiracetam in the ED given concern for possible seizures (no history of side effects on levetiracetam) and that he call his epilepsy doctor in the morning for a plan to restart lamotrigine.     I will notify the patient's epileptologist to expect this call.     Minal Moore MD    [facility]  Department of Neurology

## 2024-09-03 ENCOUNTER — TELEPHONE (OUTPATIENT)
Dept: NEUROLOGY | Facility: CLINIC | Age: 39
End: 2024-09-03

## 2024-09-03 NOTE — TELEPHONE ENCOUNTER
Patient is asking for a referral to be sent to the [place] epilepsy clinic. If this can please send that ASAP that would be great.

## 2024-10-17 ENCOUNTER — TELEPHONE (OUTPATIENT)
Dept: NEUROLOGY | Facility: CLINIC | Age: 39
End: 2024-10-17

## 2024-10-23 NOTE — TELEPHONE ENCOUNTER
Call returned to patient.  He has transferred care to a different provider group, but has ont seen them yet.  He reports that he has been able to get an answer to his question.  He noted that he is switching to the [facility], with an appointment on November 7th    Javi reported that he weaned himself off lamotrigine slowly because he was unable to tolerate the excessive yawning he feels was related to the lamotrigine.  He had not taken lamotrigine for a few weeks when he experienced two seizure events.   Patient decided he should restart the MARTINEZ, and wanted to do it safely. He contacted his PCP, who reportedly also consulted with Javi's mental health provider  Javi was provided with instructions on how to slowly reintroduce the lamotrigine.    Patient also reports he is not driving at this time    No other questions at this time.   Patient is transferring care

## 2025-05-14 ENCOUNTER — TRANSFERRED RECORDS (OUTPATIENT)
Dept: HEALTH INFORMATION MANAGEMENT | Facility: CLINIC | Age: 40
End: 2025-05-14
Payer: COMMERCIAL

## 2025-07-19 ENCOUNTER — HEALTH MAINTENANCE LETTER (OUTPATIENT)
Age: 40
End: 2025-07-19

## 2025-08-06 ENCOUNTER — VIRTUAL VISIT (OUTPATIENT)
Dept: NEUROLOGY | Facility: CLINIC | Age: 40
End: 2025-08-06
Payer: COMMERCIAL

## 2025-08-06 DIAGNOSIS — G43.709 CHRONIC MIGRAINE WITHOUT AURA WITHOUT STATUS MIGRAINOSUS, NOT INTRACTABLE: ICD-10-CM

## 2025-08-06 PROCEDURE — 98005 SYNCH AUDIO-VIDEO EST LOW 20: CPT | Mod: GC | Performed by: PSYCHIATRY & NEUROLOGY

## 2025-08-06 PROCEDURE — G2211 COMPLEX E/M VISIT ADD ON: HCPCS | Mod: 95 | Performed by: PSYCHIATRY & NEUROLOGY

## 2025-08-06 RX ORDER — TOPIRAMATE 100 MG/1
100 TABLET, FILM COATED ORAL 2 TIMES DAILY
Qty: 180 TABLET | Refills: 3 | Status: SHIPPED | OUTPATIENT
Start: 2025-08-06

## 2025-08-06 RX ORDER — RIZATRIPTAN BENZOATE 10 MG/1
10 TABLET, ORALLY DISINTEGRATING ORAL
Qty: 18 TABLET | Refills: 11 | Status: SHIPPED | OUTPATIENT
Start: 2025-08-06

## 2025-08-06 RX ORDER — ONDANSETRON 4 MG/1
4 TABLET, ORALLY DISINTEGRATING ORAL EVERY 8 HOURS PRN
Qty: 18 TABLET | Refills: 11 | Status: SHIPPED | OUTPATIENT
Start: 2025-08-06

## 2025-08-06 RX ORDER — FREMANEZUMAB-VFRM 225 MG/1.5ML
1.5 INJECTION SUBCUTANEOUS
Qty: 1.5 ML | Refills: 11 | Status: SHIPPED | OUTPATIENT
Start: 2025-08-06

## 2025-08-06 ASSESSMENT — MIGRAINE DISABILITY ASSESSMENT (MIDAS)
ON A SCALE FROM 0-10 ON AVERAGE HOW PAINFUL WERE HEADACHES: 8
HOW OFTEN WERE SOCIAL ACTIVITIES MISSED DUE TO HEADACHES: 1
HOW MANY DAYS WAS HOUSEWORK PRODUCTIVITY CUT IN HALF DUE TO HEADACHES: 1
HOW MANY DAYS WAS YOUR PRODUCTIVITY CUT IN HALF BECAUSE OF HEADACHES: 3
HOW MANY DAYS DID YOU NOT DO HOUSEWORK BECAUSE OF HEADACHES: 1
HOW MANY DAYS DID YOU MISS WORK OR SCHOOL BECAUSE OF HEADACHES: 3
HOW MANY DAYS IN THE PAST 3 MONTHS HAVE YOU HAD A HEADACHE: 1
TOTAL SCORE: 9

## 2025-08-06 ASSESSMENT — HEADACHE IMPACT TEST (HIT 6)
WHEN YOU HAVE HEADACHES HOW OFTEN IS THE PAIN SEVERE: SOMETIMES
HOW OFTEN HAVE YOU FELT TOO TIRED TO WORK BECAUSE OF YOUR HEADACHES: RARELY
HOW OFTEN HAVE YOU FELT FED UP OR IRRITATED BECAUSE OF YOUR HEADACHES: SOMETIMES
HIT6 TOTAL SCORE: 57
HOW OFTEN DO HEADACHES LIMIT YOUR DAILY ACTIVITIES: RARELY
WHEN YOU HAVE A HEADACHE HOW OFTEN DO YOU WISH YOU COULD LIE DOWN: ALWAYS
HOW OFTEN DID HEADACHS LIMIT CONCENTRATION ON WORK OR DAILY ACTIVITY: RARELY

## 2025-08-06 ASSESSMENT — PAIN SCALES - GENERAL: PAINLEVEL_OUTOF10: MODERATE PAIN (5)

## 2025-08-11 ENCOUNTER — OFFICE VISIT (OUTPATIENT)
Dept: OPHTHALMOLOGY | Facility: CLINIC | Age: 40
End: 2025-08-11
Payer: COMMERCIAL

## 2025-08-11 DIAGNOSIS — Z86.69 H/O STRABISMUS: Primary | ICD-10-CM

## 2025-08-11 DIAGNOSIS — H52.229 MYOPIA WITH REGULAR ASTIGMATISM AND PRESBYOPIA: ICD-10-CM

## 2025-08-11 DIAGNOSIS — H52.10 MYOPIA WITH REGULAR ASTIGMATISM AND PRESBYOPIA: ICD-10-CM

## 2025-08-11 DIAGNOSIS — H52.4 MYOPIA WITH REGULAR ASTIGMATISM AND PRESBYOPIA: ICD-10-CM

## 2025-08-11 PROBLEM — M25.562 PAIN IN BOTH KNEES: Status: ACTIVE | Noted: 2018-02-22

## 2025-08-11 PROBLEM — G43.909 MIGRAINE HEADACHE: Status: ACTIVE | Noted: 2017-04-05

## 2025-08-11 PROBLEM — Z91.81 HISTORY OF FALLING: Status: ACTIVE | Noted: 2020-05-06

## 2025-08-11 PROBLEM — F32.A DEPRESSIVE DISORDER: Status: ACTIVE | Noted: 2020-03-25

## 2025-08-11 PROBLEM — R45.851 SUICIDE IDEATION: Status: ACTIVE | Noted: 2020-02-04

## 2025-08-11 PROBLEM — G40.909 EPILEPSY (H): Status: ACTIVE | Noted: 2021-01-13

## 2025-08-11 PROBLEM — F33.1 MODERATE EPISODE OF RECURRENT MAJOR DEPRESSIVE DISORDER (H): Status: ACTIVE | Noted: 2020-03-25

## 2025-08-11 PROBLEM — R40.4 ALTERED LEVEL OF CONSCIOUSNESS: Status: ACTIVE | Noted: 2020-04-12

## 2025-08-11 PROBLEM — M25.561 PAIN IN BOTH KNEES: Status: ACTIVE | Noted: 2018-02-22

## 2025-08-11 PROBLEM — R12 HEARTBURN: Status: ACTIVE | Noted: 2019-01-25

## 2025-08-11 PROBLEM — E66.9 OBESITY WITH BODY MASS INDEX 30 OR GREATER: Status: ACTIVE | Noted: 2019-04-22

## 2025-08-11 PROBLEM — F90.2 ATTENTION DEFICIT HYPERACTIVITY DISORDER (ADHD), COMBINED TYPE: Chronic | Status: ACTIVE | Noted: 2020-03-25

## 2025-08-11 PROBLEM — E16.2 HYPOGLYCEMIA: Status: ACTIVE | Noted: 2017-12-15

## 2025-08-11 PROBLEM — M25.569 KNEE PAIN: Status: ACTIVE | Noted: 2018-02-22

## 2025-08-11 PROBLEM — R40.4 TRANSIENT ALTERATION OF AWARENESS: Status: ACTIVE | Noted: 2024-04-19

## 2025-08-11 PROBLEM — F60.3 BORDERLINE PERSONALITY DISORDER (H): Status: ACTIVE | Noted: 2025-08-11

## 2025-08-11 PROBLEM — R10.13 EPIGASTRIC PAIN: Status: ACTIVE | Noted: 2019-04-15

## 2025-08-11 PROBLEM — F41.1 GENERALIZED ANXIETY DISORDER: Status: ACTIVE | Noted: 2020-03-25

## 2025-08-11 PROCEDURE — 92014 COMPRE OPH EXAM EST PT 1/>: CPT | Performed by: OPTOMETRIST

## 2025-08-11 RX ORDER — LISDEXAMFETAMINE DIMESYLATE 40 MG/1
CAPSULE ORAL
COMMUNITY
Start: 2025-06-30

## 2025-08-11 ASSESSMENT — TONOMETRY
OS_IOP_MMHG: 16
IOP_METHOD: ICARE
OD_IOP_MMHG: 16

## 2025-08-11 ASSESSMENT — REFRACTION_WEARINGRX
OD_AXIS: 085
OD_SPHERE: -2.00
OS_SPHERE: -3.50
OS_AXIS: 090
SPECS_TYPE: SVL
OD_CYLINDER: +1.00
OS_CYLINDER: +2.00

## 2025-08-11 ASSESSMENT — CONF VISUAL FIELD
OD_NORMAL: 1
OS_NORMAL: 1
OD_SUPERIOR_NASAL_RESTRICTION: 0
OS_SUPERIOR_TEMPORAL_RESTRICTION: 0
OD_SUPERIOR_TEMPORAL_RESTRICTION: 0
OS_INFERIOR_TEMPORAL_RESTRICTION: 0
METHOD: COUNTING FINGERS
OS_INFERIOR_NASAL_RESTRICTION: 0
OD_INFERIOR_NASAL_RESTRICTION: 0
OD_INFERIOR_TEMPORAL_RESTRICTION: 0
OS_SUPERIOR_NASAL_RESTRICTION: 0

## 2025-08-11 ASSESSMENT — SLIT LAMP EXAM - LIDS
COMMENTS: 1+ BLEPH
COMMENTS: 1+ BLEPH

## 2025-08-11 ASSESSMENT — REFRACTION_MANIFEST
OD_AXIS: 080
OD_CYLINDER: +1.00
OS_CYLINDER: +1.75
OD_ADD: +1.25
OS_AXIS: 092
OS_ADD: +1.25
OS_SPHERE: -3.50
OD_SPHERE: -2.50

## 2025-08-11 ASSESSMENT — VISUAL ACUITY
OS_CC: J1+
OS_CC: 20/25
CORRECTION_TYPE: GLASSES
OD_CC: J1+
METHOD: SNELLEN - LINEAR
OD_CC: 20/25

## 2025-08-11 ASSESSMENT — CUP TO DISC RATIO
OD_RATIO: 0.15
OS_RATIO: 0.15

## 2025-08-11 ASSESSMENT — EXTERNAL EXAM - LEFT EYE: OS_EXAM: NORMAL

## 2025-08-11 ASSESSMENT — EXTERNAL EXAM - RIGHT EYE: OD_EXAM: NORMAL

## (undated) DEVICE — LINEN TOWEL PACK X5 5464

## (undated) DEVICE — DRAPE STERI TOWEL LG 1010

## (undated) DEVICE — SU PLAIN 6-0 G-1DA 18" 770G

## (undated) DEVICE — EYE MARKING PAD 581057

## (undated) DEVICE — DRSG STERI STRIP 1/2X4" R1547

## (undated) DEVICE — SU VICRYL 6-0 S-29 12" J556G

## (undated) DEVICE — SOL WATER IRRIG 500ML BOTTLE 2F7113

## (undated) DEVICE — EYE PREP BETADINE 5% SOLUTION 30ML 0065-0411-30

## (undated) DEVICE — ESU CORD BIPOLAR GREEN 10-4000

## (undated) DEVICE — GLOVE PROTEXIS MICRO 7.5  2D73PM75

## (undated) DEVICE — PACK MINOR EYE CUSTOM ASC

## (undated) RX ORDER — EPHEDRINE SULFATE 50 MG/ML
INJECTION, SOLUTION INTRAMUSCULAR; INTRAVENOUS; SUBCUTANEOUS
Status: DISPENSED
Start: 2023-09-11

## (undated) RX ORDER — ONDANSETRON 2 MG/ML
INJECTION INTRAMUSCULAR; INTRAVENOUS
Status: DISPENSED
Start: 2023-09-11

## (undated) RX ORDER — GLYCOPYRROLATE 0.2 MG/ML
INJECTION INTRAMUSCULAR; INTRAVENOUS
Status: DISPENSED
Start: 2023-09-11

## (undated) RX ORDER — PROPOFOL 10 MG/ML
INJECTION, EMULSION INTRAVENOUS
Status: DISPENSED
Start: 2023-09-11

## (undated) RX ORDER — ACETAMINOPHEN 325 MG/1
TABLET ORAL
Status: DISPENSED
Start: 2023-09-11

## (undated) RX ORDER — DEXAMETHASONE SODIUM PHOSPHATE 4 MG/ML
INJECTION, SOLUTION INTRA-ARTICULAR; INTRALESIONAL; INTRAMUSCULAR; INTRAVENOUS; SOFT TISSUE
Status: DISPENSED
Start: 2023-09-11

## (undated) RX ORDER — OXYCODONE HYDROCHLORIDE 5 MG/1
TABLET ORAL
Status: DISPENSED
Start: 2023-09-11

## (undated) RX ORDER — FENTANYL CITRATE 50 UG/ML
INJECTION, SOLUTION INTRAMUSCULAR; INTRAVENOUS
Status: DISPENSED
Start: 2023-09-11